# Patient Record
Sex: FEMALE | Race: WHITE | NOT HISPANIC OR LATINO | Employment: FULL TIME | URBAN - METROPOLITAN AREA
[De-identification: names, ages, dates, MRNs, and addresses within clinical notes are randomized per-mention and may not be internally consistent; named-entity substitution may affect disease eponyms.]

---

## 2017-09-14 ENCOUNTER — CONVERSION ENCOUNTER (OUTPATIENT)
Dept: RADIOLOGY | Facility: IMAGING CENTER | Age: 52
End: 2017-09-14

## 2018-04-03 LAB
ABSOL LYMPHOCYTES (HISTORICAL): 1.3 K/UL (ref 0.5–4)
ALBUMIN SERPL BCP-MCNC: 4.2 G/DL (ref 3–5.2)
ALP SERPL-CCNC: 85 U/L (ref 43–122)
ALT SERPL W P-5'-P-CCNC: 36 U/L (ref 9–52)
AMORPHOUS MATERIAL (HISTORICAL): NORMAL
ANION GAP SERPL CALCULATED.3IONS-SCNC: 9 MMOL/L (ref 5–14)
AST SERPL W P-5'-P-CCNC: 30 U/L (ref 14–36)
BACTERIA UR QL AUTO: NORMAL
BASOPHILS # BLD AUTO: 0 K/UL (ref 0–0.1)
BASOPHILS # BLD AUTO: 1 % (ref 0–1)
BILIRUB SERPL-MCNC: 0.2 MG/DL
BILIRUB UR QL STRIP: NEGATIVE MG/DL
BUN SERPL-MCNC: 21 MG/DL (ref 5–25)
CALCIUM SERPL-MCNC: 9.3 MG/DL (ref 8.4–10.2)
CASTS/CASTS TYPE (HISTORICAL): NORMAL /LPF
CHLORIDE SERPL-SCNC: 103 MEQ/L (ref 97–108)
CHOLEST SERPL-MCNC: 234 MG/DL
CHOLEST/HDLC SERPL: 5.6 {RATIO}
CLARITY UR: CLEAR
CO2 SERPL-SCNC: 29 MMOL/L (ref 22–30)
COLOR UR: YELLOW
CREATINE, SERUM (HISTORICAL): 0.8 MG/DL (ref 0.6–1.2)
CRYSTAL TYPE (HISTORICAL): NORMAL /HPF
DEPRECATED RDW RBC AUTO: 13.3 %
EGFR (HISTORICAL): >60 ML/MIN/1.73 M2
EOSINOPHIL # BLD AUTO: 0.1 K/UL (ref 0–0.4)
EOSINOPHIL NFR BLD AUTO: 1 % (ref 0–6)
ERYTHROCYTE SEDIMENTATION RATE (HISTORICAL): 11 MM (ref 1–20)
GLUCOSE FASTING (HISTORICAL): 92 MG/DL (ref 70–99)
GLUCOSE UR STRIP-MCNC: NEGATIVE MG/DL
HCT VFR BLD AUTO: 41.3 % (ref 36–46)
HDLC SERPL-MCNC: 42 MG/DL
HGB BLD-MCNC: 13.9 G/DL (ref 12–16)
HGB UR QL STRIP.AUTO: NEGATIVE
KETONES UR STRIP-MCNC: NEGATIVE MG/DL
LDL/HDL RATIO (HISTORICAL): 3.4
LDLC SERPL CALC-MCNC: 142 MG/DL
LEUKOCYTE ESTERASE UR QL STRIP: ABNORMAL
LYMPHOCYTES NFR BLD AUTO: 27 % (ref 25–45)
MAGNESIUM SERPL-MCNC: 2.2 MG/DL (ref 1.6–2.3)
MCH RBC QN AUTO: 29.2 PG (ref 26–34)
MCHC RBC AUTO-ENTMCNC: 33.6 % (ref 31–36)
MCV RBC AUTO: 87 FL (ref 80–100)
MONOCYTES # BLD AUTO: 0.3 K/UL (ref 0.2–0.9)
MONOCYTES NFR BLD AUTO: 6 % (ref 1–10)
MUCOUS THREADS URNS QL MICRO: NORMAL
NEUTROPHILS ABS COUNT (HISTORICAL): 3.1 K/UL (ref 1.8–7.8)
NEUTS SEG NFR BLD AUTO: 65 % (ref 45–65)
NITRITE UR QL STRIP: NEGATIVE
NON-SQ EPI CELLS URNS QL MICRO: NORMAL
OTHER STN SPEC: NORMAL
PH UR STRIP.AUTO: 5 [PH] (ref 4.5–8)
PLATELET # BLD AUTO: 173 K/MCL (ref 150–450)
POTASSIUM SERPL-SCNC: 5 MEQ/L (ref 3.6–5)
PROT UR STRIP-MCNC: NEGATIVE MG/DL
RBC # BLD AUTO: 4.74 M/MCL (ref 4–5.2)
RBC #/AREA URNS AUTO: NORMAL /HPF
SODIUM SERPL-SCNC: 141 MEQ/L (ref 137–147)
SP GR UR STRIP.AUTO: 1.02 (ref 1–1.04)
T4 FREE SERPL-MCNC: 0.94 NG/DL (ref 0.78–2.19)
TOTAL PROTEIN (HISTORICAL): 6.6 G/DL (ref 5.9–8.4)
TRIGL SERPL-MCNC: 251 MG/DL
TSH SERPL DL<=0.05 MIU/L-ACNC: 0.49 UIU/ML (ref 0.47–4.68)
UROBILINOGEN UR QL STRIP.AUTO: NEGATIVE MG/DL (ref 0–1)
VIT B12 SERPL-MCNC: 674 PG/ML (ref 239–931)
VITAMIN D25-HYDROXY (HISTORICAL): 83.5 NG/ML (ref 30–100)
VLDLC SERPL CALC-MCNC: 50 MG/DL (ref 0–40)
WBC # BLD AUTO: 4.8 K/MCL (ref 4.5–11)
WBC #/AREA URNS AUTO: 1 /HPF

## 2018-04-10 ENCOUNTER — CONVERSION ENCOUNTER (OUTPATIENT)
Dept: MAMMOGRAPHY | Facility: CLINIC | Age: 53
End: 2018-04-10

## 2018-10-25 ENCOUNTER — TRANSCRIBE ORDERS (OUTPATIENT)
Dept: ADMINISTRATIVE | Facility: HOSPITAL | Age: 53
End: 2018-10-25

## 2018-10-25 DIAGNOSIS — Z09 FOLLOW UP: Primary | ICD-10-CM

## 2018-11-01 ENCOUNTER — HOSPITAL ENCOUNTER (OUTPATIENT)
Dept: MAMMOGRAPHY | Facility: CLINIC | Age: 53
Discharge: HOME | End: 2018-11-01
Payer: COMMERCIAL

## 2018-11-01 DIAGNOSIS — Z09 FOLLOW UP: ICD-10-CM

## 2018-11-01 PROCEDURE — 77066 DX MAMMO INCL CAD BI: CPT

## 2019-03-22 ENCOUNTER — TRANSCRIBE ORDERS (OUTPATIENT)
Dept: ADMINISTRATIVE | Facility: HOSPITAL | Age: 54
End: 2019-03-22

## 2019-03-22 ENCOUNTER — APPOINTMENT (OUTPATIENT)
Dept: LAB | Facility: HOSPITAL | Age: 54
End: 2019-03-22
Payer: COMMERCIAL

## 2019-03-22 DIAGNOSIS — E03.9 ACQUIRED HYPOTHYROIDISM: ICD-10-CM

## 2019-03-22 DIAGNOSIS — E03.9 ACQUIRED HYPOTHYROIDISM: Primary | ICD-10-CM

## 2019-03-22 LAB — TSH SERPL DL<=0.05 MIU/L-ACNC: 1.01 UIU/ML (ref 0.36–3.74)

## 2019-03-22 PROCEDURE — 84443 ASSAY THYROID STIM HORMONE: CPT

## 2019-03-22 PROCEDURE — 36415 COLL VENOUS BLD VENIPUNCTURE: CPT

## 2020-06-01 ENCOUNTER — TRANSCRIBE ORDERS (OUTPATIENT)
Dept: ADMINISTRATIVE | Facility: HOSPITAL | Age: 55
End: 2020-06-01

## 2020-06-01 DIAGNOSIS — Z12.31 VISIT FOR SCREENING MAMMOGRAM: Primary | ICD-10-CM

## 2020-06-01 DIAGNOSIS — G47.9 SLEEP DISTURBANCES: Primary | ICD-10-CM

## 2020-06-05 ENCOUNTER — TRANSCRIBE ORDERS (OUTPATIENT)
Dept: LAB | Facility: HOSPITAL | Age: 55
End: 2020-06-05

## 2020-06-05 ENCOUNTER — HOSPITAL ENCOUNTER (OUTPATIENT)
Dept: MAMMOGRAPHY | Facility: CLINIC | Age: 55
Discharge: HOME | End: 2020-06-05
Payer: COMMERCIAL

## 2020-06-05 DIAGNOSIS — Z12.31 VISIT FOR SCREENING MAMMOGRAM: ICD-10-CM

## 2020-06-05 DIAGNOSIS — E03.9 ACQUIRED HYPOTHYROIDISM: ICD-10-CM

## 2020-06-05 DIAGNOSIS — R53.83 FATIGUE, UNSPECIFIED TYPE: Primary | ICD-10-CM

## 2020-06-05 DIAGNOSIS — R92.8 ABNORMAL MAMMOGRAM: ICD-10-CM

## 2020-06-16 ENCOUNTER — HOSPITAL ENCOUNTER (OUTPATIENT)
Dept: MAMMOGRAPHY | Facility: CLINIC | Age: 55
Discharge: HOME | End: 2020-06-16

## 2020-06-16 VITALS — BODY MASS INDEX: 30.04 KG/M2 | HEIGHT: 59 IN | WEIGHT: 149 LBS

## 2020-06-16 PROCEDURE — 77066 DX MAMMO INCL CAD BI: CPT

## 2020-06-16 PROCEDURE — G0279 TOMOSYNTHESIS, MAMMO: HCPCS

## 2020-06-23 ENCOUNTER — TRANSCRIBE ORDERS (OUTPATIENT)
Dept: ADMINISTRATIVE | Facility: HOSPITAL | Age: 55
End: 2020-06-23

## 2020-06-23 DIAGNOSIS — G47.09 OTHER INSOMNIA: Primary | ICD-10-CM

## 2020-06-26 ENCOUNTER — HOSPITAL ENCOUNTER (OUTPATIENT)
Dept: SLEEP CENTER | Facility: CLINIC | Age: 55
Discharge: HOME | End: 2020-06-26
Payer: COMMERCIAL

## 2020-06-26 DIAGNOSIS — G47.09 OTHER INSOMNIA: ICD-10-CM

## 2020-06-26 PROCEDURE — 95810 POLYSOM 6/> YRS 4/> PARAM: CPT | Performed by: INTERNAL MEDICINE

## 2020-06-26 PROCEDURE — 95810 POLYSOM 6/> YRS 4/> PARAM: CPT

## 2020-07-02 ENCOUNTER — TELEPHONE (OUTPATIENT)
Dept: SLEEP CENTER | Facility: CLINIC | Age: 55
End: 2020-07-02

## 2020-07-02 NOTE — TELEPHONE ENCOUNTER
Advised patient sleep study shows moderate AMY   Patient will call back to schedule consult with sleep medicine

## 2020-07-15 ENCOUNTER — TRANSCRIBE ORDERS (OUTPATIENT)
Dept: ADMINISTRATIVE | Facility: HOSPITAL | Age: 55
End: 2020-07-15

## 2020-07-15 DIAGNOSIS — G47.9 SLEEP DISTURBANCE: Primary | ICD-10-CM

## 2020-07-17 ENCOUNTER — OFFICE VISIT (OUTPATIENT)
Dept: SLEEP CENTER | Facility: CLINIC | Age: 55
End: 2020-07-17

## 2020-07-17 VITALS
BODY MASS INDEX: 28.51 KG/M2 | WEIGHT: 141.4 LBS | SYSTOLIC BLOOD PRESSURE: 118 MMHG | DIASTOLIC BLOOD PRESSURE: 82 MMHG | HEART RATE: 78 BPM | OXYGEN SATURATION: 99 % | HEIGHT: 59 IN

## 2020-07-17 DIAGNOSIS — G47.9 SLEEP DISTURBANCE: ICD-10-CM

## 2020-07-17 DIAGNOSIS — G47.33 OSA (OBSTRUCTIVE SLEEP APNEA): Primary | ICD-10-CM

## 2020-07-17 DIAGNOSIS — G47.26 SHIFT WORK SLEEP DISORDER: ICD-10-CM

## 2020-07-17 DIAGNOSIS — E66.9 OBESITY (BMI 30-39.9): ICD-10-CM

## 2020-07-17 DIAGNOSIS — F41.9 ANXIETY AND DEPRESSION: ICD-10-CM

## 2020-07-17 DIAGNOSIS — M79.7 FIBROMYALGIA SYNDROME: ICD-10-CM

## 2020-07-17 DIAGNOSIS — F32.A ANXIETY AND DEPRESSION: ICD-10-CM

## 2020-07-17 PROCEDURE — 99244 OFF/OP CNSLTJ NEW/EST MOD 40: CPT | Performed by: INTERNAL MEDICINE

## 2020-07-17 RX ORDER — CYCLOBENZAPRINE HCL 10 MG
10 TABLET ORAL DAILY
COMMUNITY
Start: 2020-04-30 | End: 2021-03-11 | Stop reason: SDUPTHER

## 2020-07-17 RX ORDER — LEVOTHYROXINE SODIUM 0.07 MG/1
75 TABLET ORAL DAILY
COMMUNITY
Start: 2020-04-30 | End: 2020-10-22 | Stop reason: SDUPTHER

## 2020-07-17 RX ORDER — DICYCLOMINE HYDROCHLORIDE 10 MG/1
1 CAPSULE ORAL 3 TIMES DAILY
COMMUNITY
Start: 2013-03-27 | End: 2022-06-13 | Stop reason: SDUPTHER

## 2020-07-17 RX ORDER — ZOLPIDEM TARTRATE 10 MG/1
10 TABLET ORAL DAILY PRN
COMMUNITY
Start: 2020-04-30 | End: 2021-09-24

## 2020-07-17 RX ORDER — SERTRALINE HYDROCHLORIDE 100 MG/1
150 TABLET, FILM COATED ORAL EVERY 24 HOURS
COMMUNITY
Start: 2018-02-26 | End: 2020-10-22 | Stop reason: ALTCHOICE

## 2020-07-17 NOTE — PROGRESS NOTES
Review of Systems      Genitourinary need to urinate more than twice a night and post menopausal (no peroids)   Cardiology palpitations/fluttering feeling in the chest   Gastrointestinal frequent heartburn/acid reflux   Neurology need to move extremities, muscle weakness, numbness/tingling of an extremity, forgetfulness, poor concentration or confusion,  and difficulty with memory   Constitutional fatigue   Integumentary none   Psychiatry anxiety and depression   Musculoskeletal joint pain, muscle aches, back pain, legs twitching/jerking and sciatica   Pulmonary snoring   ENT throat clearing   Endocrine frequent urination   Hematological none

## 2020-07-17 NOTE — PROGRESS NOTES
Consultation - [address] ARIANA Perales  54 y o  female  :1965  [id number]    Physician Requesting Consult: Ranjit Duarte MD     Reason for Consult : At your kind request I saw this patient for initial sleep evaluation today  Patient recently had a diagnostic sleep study and is here to review results / treatment options  The study demonstrated   obstructive sleep apnea: AHI 24 /hour considerably higher during REM at 50 per hour  Minimum oxygen saturation 78 %  and 13 6 minutes of total sleep time was spent with saturations less than 90%  PFSH, Problem List, Medications & Allergies were reviewed in EMR  She  has no past medical history on file  She has a current medication list which includes the following prescription(s): cyclobenzaprine, dicyclomine, inositol niacinate, levothyroxine, sertraline, and zolpidem  HPI:  Study was undertaken for her complaints of difficulty sleeping of several years duration  She works 3rd shift and sleep difficulties have escalated recently  She has racing thoughts due to anxiety for which she was on Zoloft 100 mg daily and is due to be increased to 150 mg  She typically sleeps alone but family note snoring  At times she has a woken herself with choking or gasping  Other Complaints:  Increased psychosocial stressors  Restless Leg Syndrome: has suggestive symptoms and feels involuntary jerking of her limbs for which she is using a weighted blanket  Parasomnia: reports teeth grinding during sleep for which she uses a dental guard, but no other features of parasomnia   Sleep Routine:   Typical Bedtime:  6:00 a m  Gets OOB:  10:00 a m  TIB:4 hrs  Sleep latency:> 60 minutes Sleep Interruptions:1-2/nite because of nocturia  Awakens: with aid of an alarm  Upon awakening: never feels rested  She estimates getting 2 hrs sleep    She gets an additional 2 hours sleep in the afternoons out of approximately 5 hours trying to sleep in spite of zolpidem 10 mg   She has episodic Sleepiness and 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  Foster Sleepiness Scale rated at Total score: 3 /24  Habits: reports that she has never smoked  She has never used smokeless tobacco  , has no alcohol history on file  ,  has no drug history on file  ,Caffeine use:limited , Exercise routine: sometimes   Family History: Negative for sleep disturbance  ROS - constitutional, psychiatric, ENT, respiratory,CVS, GI, UGS, CNS, MSK, integumentary, endocrine, hematological reviewed  Significant for weight has been stable    The 12 point review of systems was otherwise negative  EXAM:  /82   Pulse 78   Ht 4' 11" (1 499 m)   Wt 64 1 kg (141 lb 6 4 oz)   SpO2 99%   BMI 28 56 kg/m²    General: Well groomed female, well appearing, in no apparent distress  Psychiatric: Alert and orientated; Cooperative; Speech:  Pressured; appears anxious and has a constricted affect  HEENT:  Craniofacial anatomy: slight overjet  Sinuses: non- tender  TMJ: Normal    Eyes: EOM's intact;  conjunctiva/corneas clear   Ears: Externallyappear normal     Nasal Airway: assymetric nares Septum:  Deviated; Mucosa: normal; Turbinates: normal; Rhinorrhea: None   Mouth: Lips: normal posture; Dentition: normal   Mucosa:moist  ; Hard Palate:normal    Oropharryx: crowded and AP narrowing Tongue: Mallampati:Class IV, Mobile and ScallopedSoft Palate:  redundant  Tonsils: no hypertrophy  Neck: Neck Circumference: 13 5 "; Supple; no abnormal masses; Thyroid:normal  Trachea:central     Lymph: No Cervical or Submandibular Lymhadenopathy  Heart: S1,S2 normal; RRR; no gallop; nomurmurs   Lungs: Respiratory Effort:normal  Air entry good bilaterally  No wheezes  No rales  Abdomen: Obese, Soft & non-tender    Extremities: No pedal edema  No clubbing or cyanosis  Skin: warm and dry; Color& Hydration good; no facial rashes or lesions   Neurological: CNII-XII intact;  Motor normal; Sensation normal  Musculoskeletal: Muscle bulk, tone and power WNL Gait:normal        IMPRESSION: Primary, Secondary Sleep Diagnoses (to Medical or Psych conditions) & Comorbidities   1  AMY (obstructive sleep apnea)  Cpap DME   2  Sleep disturbance  Ambulatory referral to Sleep Medicine   3  Shift work sleep disorder     4  Anxiety and depression     5  Fibromyalgia syndrome     6  Obesity (BMI 30-39  9)          PLAN:   1  I reviewed results of the Sleep study with the patient  2  With respect to above conditions, I counseled on pathophysiology, diagnosis, treatment options, risks and benefits; inter-relationship and effects on symptoms and comorbidities; risks of no treatment; costs and insurance aspects  3  Patient elected positive airway pressure therapy but because of insurance constraints, cannot undertake an in-lab titration study  I gave her a prescription for auto titrating Pap 5-15 cm H2O     4  Cognitive behavioral therapy was initiated, Sleep Hygiene and behavioral techniques to manage Insomnia and cope with shift work were discussed  I also advised considering changing to regular daytime shift work  5  She may need reviewing of her psychiatric medication  Consideration may be given to Cymbalta if symptoms persist, as it may in addition be beneficial for fibromyalgia  6  I also advised on weight reduction  7  Follow-up to be scheduled in 2 months to monitor progress, compliance and to adjust therapy           Sincerely,     Authenticated electronically by Karina Feldman MD   on 25/48/96   Board Certified Specialist

## 2020-07-17 NOTE — PATIENT INSTRUCTIONS
What is AMY? Obstructive sleep apnea is a common and serious sleep disorder that causes you to stop breathing during sleep  The airway repeatedly becomes blocked, limiting the amount of air that reaches your lungs  When this happens, you may snore loudly or making choking noises as you try to breathe  Your brain and body becomes oxygen deprived and you may wake up  This may happen a few times a night, or in more severe cases, several hundred times a night  Sleep apnea can make you wake up in the morning feeling tired or unrefreshed even though you have had a full night of sleep  During the day, you may feel fatigued, have difficulty concentrating or you may even unintentionally fall asleep  This is because your body is waking up numerous times throughout the night, even though you might not be conscious of each awakening  The lack of oxygen your body receives can have negative long-term consequences for your health  This includes:  High blood pressure  Heart disease  Irregular heart rhythms  Stroke  Pre-diabetes and diabetes  Depression    Testing  An objective evaluation of your sleep may be needed before your board certified sleep physician can make a diagnosis  Options include:   In-lab overnight sleep study  This type of sleep study requires you to stay overnight at a sleep center, in a bed that may resemble a hotel room  You will sleep with sensors hooked up to various parts of your body  These sensors record your brain waves, heartbeat, breathing and movement  An overnight sleep study also provides your doctor with the most complete information about your sleep  Learn more about an overnight sleep study      Home sleep apnea test  Some patients with high risk factors for obstructive sleep apnea and no other medical disorders may be candidates for a home sleep apnea test  The testing equipment differs in that it is less complicated than what is used in an overnight sleep study   As such, does not give all the data an in-lab will and if negative, may not mean you do not have the problem  Treatment for sleep apnea  includes using a continuous positive airway pressure (CPAP) machine to keep your airway open during sleep  A mask is placed over your nose and mouth, or just your nose  The mask is hooked to the CPAP machine that blows a gentle stream of air into the mask when you breathe  This helps keep your airway open so you can breathe more regularly  Extra oxygen may be given to you through the machine  You may be given a mouth device  It looks like a mouth guard or dental retainer and stops your tongue and mouth tissues from blocking your throat while you sleep  Surgery may be needed to remove extra tissues that block your mouth, throat, or nose  Manage sleep apnea:   Do not smoke  Nicotine and other chemicals in cigarettes and cigars can cause lung damage  Ask your healthcare provider for information if you currently smoke and need help to quit  E-cigarettes or smokeless tobacco still contain nicotine  Talk to your healthcare provider before you use these products  Do not drink alcohol or take sedative medicine before you go to sleep  Alcohol and sedatives can relax the muscles and tissues around your throat  This can block the airflow to your lungs  Maintain a healthy weight  Excess tissue around your throat may restrict your breathing  Ask your healthcare provider for information if you need to lose weight  Sleep on your side or use pillows designed to prevent sleep apnea  This prevents your tongue or other tissues from blocking your throat  You can also raise the head of your bed  Driving Safety  Refrain from driving when drowsy  Follow up with your healthcare provider as directed:  Write down your questions so you remember to ask them during your visits  Go to [employer]ASM website for more information: Sleepeducation  org     What is AMY?    Obstructive sleep apnea is a common and serious sleep disorder that causes you to stop breathing during sleep  The airway repeatedly becomes blocked, limiting the amount of air that reaches your lungs  When this happens, you may snore loudly or making choking noises as you try to breathe  Your brain and body becomes oxygen deprived and you may wake up  This may happen a few times a night, or in more severe cases, several hundred times a night  Sleep apnea can make you wake up in the morning feeling tired or unrefreshed even though you have had a full night of sleep  During the day, you may feel fatigued, have difficulty concentrating or you may even unintentionally fall asleep  This is because your body is waking up numerous times throughout the night, even though you might not be conscious of each awakening  The lack of oxygen your body receives can have negative long-term consequences for your health  This includes:  High blood pressure  Heart disease  Irregular heart rhythms  Stroke  Pre-diabetes and diabetes  Depression    Testing  An objective evaluation of your sleep may be needed before your board certified sleep physician can make a diagnosis  Options include:   In-lab overnight sleep study  This type of sleep study requires you to stay overnight at a sleep center, in a bed that may resemble a hotel room  You will sleep with sensors hooked up to various parts of your body  These sensors record your brain waves, heartbeat, breathing and movement  An overnight sleep study also provides your doctor with the most complete information about your sleep  Learn more about an overnight sleep study      Home sleep apnea test  Some patients with high risk factors for obstructive sleep apnea and no other medical disorders may be candidates for a home sleep apnea test  The testing equipment differs in that it is less complicated than what is used in an overnight sleep study   As such, does not give all the data an in-lab will and if negative, may not mean you do not have the problem  Treatment for sleep apnea  includes using a continuous positive airway pressure (CPAP) machine to keep your airway open during sleep  A mask is placed over your nose and mouth, or just your nose  The mask is hooked to the CPAP machine that blows a gentle stream of air into the mask when you breathe  This helps keep your airway open so you can breathe more regularly  Extra oxygen may be given to you through the machine  You may be given a mouth device  It looks like a mouth guard or dental retainer and stops your tongue and mouth tissues from blocking your throat while you sleep  Surgery may be needed to remove extra tissues that block your mouth, throat, or nose  Manage sleep apnea:   Do not smoke  Nicotine and other chemicals in cigarettes and cigars can cause lung damage  Ask your healthcare provider for information if you currently smoke and need help to quit  E-cigarettes or smokeless tobacco still contain nicotine  Talk to your healthcare provider before you use these products  Do not drink alcohol or take sedative medicine before you go to sleep  Alcohol and sedatives can relax the muscles and tissues around your throat  This can block the airflow to your lungs  Maintain a healthy weight  Excess tissue around your throat may restrict your breathing  Ask your healthcare provider for information if you need to lose weight  Sleep on your side or use pillows designed to prevent sleep apnea  This prevents your tongue or other tissues from blocking your throat  You can also raise the head of your bed  Driving Safety  Refrain from driving when drowsy  Follow up with your healthcare provider as directed:  Write down your questions so you remember to ask them during your visits  Go to AASM website for more information: Sleepeducation  org       What you can do to improve your sleep: (Sleep Hygiene) Basic rules for a good night's sleep    Create a regular sleep schedule    This will help you form a sleep routine  Keep a record of your sleep patterns, and any sleeping problems you have  Bring the record to follow-up visits with healthcare providers  Avoid prolonged use of light-emitting screens before bedtime or watching TV in bed  Avoid forcing sleep  Do not take naps  Naps could make it hard for you to fall asleep at bedtime  Deal with your worries before bedtime  Keep your bedroom cool, quiet, and dark  Turn on white noise, such as a fan, to help you relax  Do not use your bed for any activity that will keep you awake  Do not read, exercise, eat, or watch TV in your bedroom  Get up if you do not fall asleep within 20 minutes  Move to another room and do something relaxing until you become sleepy  Limit caffeine, alcohol, nicotine and food to earlier in the day  Only drink caffeine in the morning  Do not drink alcohol within 6 hours of bedtime  Do not eat a heavy meal right before you go to bed  Avoid smoking, especially in the evening  Exercise regularly  Daily exercise will help you sleep better  Do not exercise within 4 hours of bedtime  Stimulus control therapy rules  1  Go to bed only when sleepy  2  Do not watch television, read, eat, or worry while in bed  Use bed only for sleep and sex  3  Get out of bed if unable to fall asleep within 20 minutes and go to another room  Return to bed only when sleepy  Repeat this step as many times as necessary throughout the night  4  Set an alarm clock to wake up at a fixed time each morning, including weekends  5  Do not take a nap during the day  Data from: [address] [place] [address], [address] [employer] Nonpharmacologic treatments of insomnia  J [facility] Psych[facility] 4687; 53:37  Go to AASM website for more information: Sleepeducation  org     Recommended Reading:  Book by authors [address]   No More sleepless nights          Nursing Support:  When: Monday through Friday 7A-5PM except holidays  Where: Our direct line is [phone]  If you are having a true emergency please call 911  In the event that the line is busy or it is after hours please leave a voice message and we will return your call  Please speak clearly, leaving your full name, birth date, best number to reach you and the reason for your call  Medication refills: We will need the name of the medication, the dosage, the ordering provider, whether you get a 30 or 90 day refill, and the pharmacy name and address  Medications will be ordered by the provider only  Nurses cannot call in prescriptions  Please allow 7 days for medication refills  Physician requested updates: If your provider requested that you call with an update after starting medication, please be ready to provide us the medication and dosage, what time you take your medication, the time you attempt to fall asleep, time you fall asleep, when you wake up, and what time you get out of bed  Sleep Study Results: We will contact you with sleep study results and/or next steps after the physician has reviewed your testing

## 2020-09-18 ENCOUNTER — OFFICE VISIT (OUTPATIENT)
Dept: SLEEP CENTER | Facility: CLINIC | Age: 55
End: 2020-09-18
Payer: COMMERCIAL

## 2020-09-18 VITALS
HEIGHT: 59 IN | BODY MASS INDEX: 28.51 KG/M2 | DIASTOLIC BLOOD PRESSURE: 82 MMHG | SYSTOLIC BLOOD PRESSURE: 112 MMHG | HEART RATE: 93 BPM | WEIGHT: 141.4 LBS | OXYGEN SATURATION: 98 %

## 2020-09-18 DIAGNOSIS — E66.9 OBESITY (BMI 30-39.9): ICD-10-CM

## 2020-09-18 DIAGNOSIS — F41.9 ANXIETY AND DEPRESSION: ICD-10-CM

## 2020-09-18 DIAGNOSIS — M79.7 FIBROMYALGIA SYNDROME: ICD-10-CM

## 2020-09-18 DIAGNOSIS — F32.A ANXIETY AND DEPRESSION: ICD-10-CM

## 2020-09-18 DIAGNOSIS — G47.26 SHIFT WORK SLEEP DISORDER: ICD-10-CM

## 2020-09-18 DIAGNOSIS — G47.33 OSA (OBSTRUCTIVE SLEEP APNEA): Primary | ICD-10-CM

## 2020-09-18 DIAGNOSIS — G47.9 SLEEP DISTURBANCE: ICD-10-CM

## 2020-09-18 PROCEDURE — 99214 OFFICE O/P EST MOD 30 MIN: CPT | Performed by: INTERNAL MEDICINE

## 2020-09-18 NOTE — PATIENT INSTRUCTIONS

## 2020-09-18 NOTE — PROGRESS NOTES
Review of Systems      Genitourinary need to urinate more than twice a night and post menopausal (no peroids)   Cardiology Frequent chest pain or angina,    Gastrointestinal frequent heartburn/acid reflux   Neurology muscle weakness, numbness/tingling of an extremity, forgetfulness and poor concentration or confusion,    Constitutional fatigue   Integumentary none   Psychiatry anxiety and depression   Musculoskeletal joint pain, muscle aches and back pain   Pulmonary none   ENT throat clearing   Endocrine frequent urination   Hematological none

## 2020-09-18 NOTE — PROGRESS NOTES
Follow-Up Note - [address]  54 y o  female  :1965  :[id number]    CC: I saw this patient for follow-up in clinic today for Sleep disordered breathing, Coexisting Sleep and Medical Problems  The study demonstrated   obstructive sleep apnea: AHI 24 /hour considerably higher during REM at 50 per hour  Minimum oxygen saturation 78 %  and 13 6 minutes of total sleep time was spent with saturations less than 90%  PFSH, Problem List, Medications & Allergies were reviewed in EMR  Interval changes: none reported  She  has no past medical history on file  She has a current medication list which includes the following prescription(s): cyclobenzaprine, dicyclomine, inositol niacinate, levothyroxine, sertraline, and zolpidem  ROS: constitutional, psychiatric, ENT, respiratory,CVS, GI, UGS, CNS, MSK, integumentary, endocrine, hematological reviewed  Significant for she had recent increase of her Zoloft but continues to have racing thoughts  She also has increased acid reflux and she discontinued Prilosec  DATA REVIEWED:  No data was available, but she reports regular use of the device for > 4hours/night  SUBJECTIVE: Regarding use of PAP, Arlette West reports:   · She is experiencing some adverse effects: dry mouth/throat and dry nose  · She is   benefiting from use: Unsure   Sleep Routine: She reports getting 2-3 hrs sleep in the morning between 6 and 10:00 a m  And additional few hours in the afternoon; she has difficulty initiating and maintaining sleep   She tried Ambien and felt no benefit  She awakens with aid of an alarm and is not always refreshed  She has episodic drowsiness but avoids napping  She rated herself at Total score: 4 /24 on the [place] sleepiness scale  Habits: reports that she has never smoked  She has never used smokeless tobacco ,  reports previous alcohol use ,  has no history on file for drug  , Caffeine use: moderate , Exercise routine: regular    EXAM: /82   Pulse 93   Ht 4' 11" (1 499 m)   Wt 64 1 kg (141 lb 6 4 oz)   SpO2 98%   BMI 28 56 kg/m²   Patient is well groomed; well appearing  Skin/Extrem: warm & dry; col & hydration normal; no edema  Psych: cooperativeand in no distress  Mental state appears normal   CNS: Alert, orientated, clear & coherent speech  H&N: EOMI; NC/AT:no facial pressure marks, no rashes  ENMT Mucus membranes appear normal Nasal airway:patent  Oral airway:  crowded  Resp:effort is normal CVS: RRR ABD:truncal obesity MSK:Gait normal     IMPRESSION: Primary Sleep/Secondary(to Medical or Psych conditions) & comorbidities   1  AMY (obstructive sleep apnea)  PAP DME Resupply/Reorder   2  Sleep disturbance     3  Shift work sleep disorder     4  Anxiety and depression     5  Fibromyalgia syndrome     6  Obesity (BMI 30-39  9)         PLAN:  1  Treatment with  PAP is medically necessary and Anselmo Lozada is agreable to continue use  2  Care of equipment, methods to improve comfort using PAP and importance of compliance with therapy were discussed  3  Pressure setting: continue 5-15 cmH2O     4  Rx provided to replace supplies and Care coordinated with DME provider  5  She is unable to change her work shift  I discussed strategies to cope with shift work  6  She is under care for her psychiatric problems  7  Discussed  strategies for weight reduction  8  Follow-up is advised in 1 year or sooner if needed to monitor progress, compliance and to adjust therapy  Thank you for allowing me to participate in the care of this patient      Sincerely,    Authenticated electronically by Som Herrera MD on 10/19/30   Board Certified Specialist

## 2020-09-21 ENCOUNTER — TELEPHONE (OUTPATIENT)
Dept: SLEEP CENTER | Facility: CLINIC | Age: 55
End: 2020-09-21

## 2020-09-21 NOTE — TELEPHONE ENCOUNTER
Left message for patient regarding resupply order  Requested patient call office back with name of DME provider

## 2020-09-24 NOTE — TELEPHONE ENCOUNTER
Patient left message, states she gets her supplies on line, asking for script to be mailed to her home    Script mailed

## 2020-10-20 ENCOUNTER — TELEPHONE (OUTPATIENT)
Dept: FAMILY MEDICINE CLINIC | Facility: CLINIC | Age: 55
End: 2020-10-20

## 2020-10-22 ENCOUNTER — TELEPHONE (OUTPATIENT)
Dept: FAMILY MEDICINE CLINIC | Facility: CLINIC | Age: 55
End: 2020-10-22

## 2020-10-22 ENCOUNTER — OFFICE VISIT (OUTPATIENT)
Dept: FAMILY MEDICINE CLINIC | Facility: CLINIC | Age: 55
End: 2020-10-22
Payer: COMMERCIAL

## 2020-10-22 VITALS
SYSTOLIC BLOOD PRESSURE: 110 MMHG | HEIGHT: 60 IN | BODY MASS INDEX: 27.29 KG/M2 | TEMPERATURE: 97.8 F | WEIGHT: 139 LBS | DIASTOLIC BLOOD PRESSURE: 64 MMHG

## 2020-10-22 DIAGNOSIS — K21.9 GASTROESOPHAGEAL REFLUX DISEASE WITHOUT ESOPHAGITIS: ICD-10-CM

## 2020-10-22 DIAGNOSIS — F41.1 GENERALIZED ANXIETY DISORDER: Primary | ICD-10-CM

## 2020-10-22 DIAGNOSIS — E78.5 DYSLIPIDEMIA: ICD-10-CM

## 2020-10-22 DIAGNOSIS — G47.33 OBSTRUCTIVE SLEEP APNEA SYNDROME: ICD-10-CM

## 2020-10-22 DIAGNOSIS — E03.9 ACQUIRED HYPOTHYROIDISM: ICD-10-CM

## 2020-10-22 PROCEDURE — 1036F TOBACCO NON-USER: CPT | Performed by: FAMILY MEDICINE

## 2020-10-22 PROCEDURE — 3725F SCREEN DEPRESSION PERFORMED: CPT | Performed by: FAMILY MEDICINE

## 2020-10-22 PROCEDURE — 99204 OFFICE O/P NEW MOD 45 MIN: CPT | Performed by: FAMILY MEDICINE

## 2020-10-22 RX ORDER — SUCRALFATE 1 G/1
1 TABLET ORAL 4 TIMES DAILY
COMMUNITY
End: 2021-06-24 | Stop reason: ALTCHOICE

## 2020-10-22 RX ORDER — LEVOTHYROXINE SODIUM 0.07 MG/1
75 TABLET ORAL DAILY
Qty: 90 TABLET | Refills: 3 | Status: SHIPPED | OUTPATIENT
Start: 2020-10-22 | End: 2021-09-24

## 2020-10-22 RX ORDER — ESCITALOPRAM OXALATE 20 MG/1
20 TABLET ORAL DAILY
Qty: 90 TABLET | Refills: 1 | Status: SHIPPED | OUTPATIENT
Start: 2020-10-22 | End: 2021-04-01 | Stop reason: ALTCHOICE

## 2020-10-22 RX ORDER — CETIRIZINE HYDROCHLORIDE 10 MG/1
10 TABLET ORAL DAILY
COMMUNITY

## 2021-01-07 ENCOUNTER — TELEMEDICINE (OUTPATIENT)
Dept: FAMILY MEDICINE CLINIC | Facility: CLINIC | Age: 56
End: 2021-01-07
Payer: COMMERCIAL

## 2021-01-07 DIAGNOSIS — Z11.59 SCREENING FOR VIRAL DISEASE: ICD-10-CM

## 2021-01-07 DIAGNOSIS — Z11.59 SCREENING FOR VIRAL DISEASE: Primary | ICD-10-CM

## 2021-01-07 PROCEDURE — U0005 INFEC AGEN DETEC AMPLI PROBE: HCPCS | Performed by: FAMILY MEDICINE

## 2021-01-07 PROCEDURE — U0003 INFECTIOUS AGENT DETECTION BY NUCLEIC ACID (DNA OR RNA); SEVERE ACUTE RESPIRATORY SYNDROME CORONAVIRUS 2 (SARS-COV-2) (CORONAVIRUS DISEASE [COVID-19]), AMPLIFIED PROBE TECHNIQUE, MAKING USE OF HIGH THROUGHPUT TECHNOLOGIES AS DESCRIBED BY CMS-2020-01-R: HCPCS | Performed by: FAMILY MEDICINE

## 2021-01-07 PROCEDURE — 99213 OFFICE O/P EST LOW 20 MIN: CPT | Performed by: FAMILY MEDICINE

## 2021-01-07 NOTE — PROGRESS NOTES
Virtual Regular Visit      Assessment/Plan:  Await COVID testing  We talked about possibly starting vitamin C vitamin D and zinc   She will call with any worsening or persisting symptoms in the interim  Problem List Items Addressed This Visit     None      Visit Diagnoses     Screening for viral disease    -  Primary    Relevant Orders    Novel Coronavirus (COVID-19), PCR [facility] - Collected at [facility] or [facility]               Reason for visit is   Chief Complaint   Patient presents with    Virtual Regular Visit        Encounter provider Nancy Jiang DO    Provider located at [address] [place] [address] [phone]      Recent Visits  No visits were found meeting these conditions  Showing recent visits within past 7 days and meeting all other requirements     Today's Visits  Date Type Provider Dept   01/07/21 Telemedicine Nancy Jiang DO [facility]   Showing today's visits and meeting all other requirements     Future Appointments  No visits were found meeting these conditions  Showing future appointments within next 150 days and meeting all other requirements        The patient was identified by name and date of birth  Frank Alvarez was informed that this is a telemedicine visit and that the visit is being conducted through [address] [place] [address] and patient was informed that this is not a secure, HIPAA-compliant platform  She agrees to proceed     My office door was closed  No one else was in the room  She acknowledged consent and understanding of privacy and secu for COVID rity of the video platform  The patient has agreed to participate and understands they can discontinue the visit at any time  Patient is aware this is a billable service  Subjective  Frank Alvarez is a 54 y o  female for COVID exposure         Patient was recently exposed to [place]  Her son who lives in the house was diagnosed with COVID a few days ago  He has mild symptoms  Patient began with mild congestion today  No past medical history on file  Past Surgical History:   Procedure Laterality Date    GALLBLADDER SURGERY      HYSTERECTOMY  2010       Current Outpatient Medications   Medication Sig Dispense Refill    AZELASTINE-FLUTICASONE NA into each nostril      cetirizine (ZyrTEC) 10 mg tablet Take 10 mg by mouth daily      cyclobenzaprine (FLEXERIL) 10 mg tablet Take 10 mg by mouth daily      dicyclomine (BENTYL) 10 mg capsule Take 1 capsule by mouth 3 (three) times a day       escitalopram (LEXAPRO) 20 mg tablet Take 1 tablet (20 mg total) by mouth daily 90 tablet 1    Inositol Niacinate 500 MG CAPS Take 1 capsule by mouth every 12 (twelve) hours      levothyroxine 75 mcg tablet Take 1 tablet (75 mcg total) by mouth daily 90 tablet 3    MILK THISTLE PO Take by mouth      Omega-3 Fatty Acids (FISH OIL PO) Take by mouth      Probiotic Product (PROBIOTIC PO) Take by mouth      sucralfate (CARAFATE) 1 g tablet Take 1 g by mouth 4 (four) times a day      zolpidem (AMBIEN) 10 mg tablet Take 10 mg by mouth daily as needed       No current facility-administered medications for this visit  Allergies   Allergen Reactions    Aspirin Other (See Comments)     Reaction Date: 08Jun2011;       Erythromycin Base Other (See Comments)    Iodine Other (See Comments)     ivp dye    Morphine Headache and Other (See Comments)     migraine headaches      Prochlorperazine Other (See Comments)    Tetracycline Other (See Comments)     Reaction Date: 08Jun2011;       Metoclopramide Anxiety, Other (See Comments) and Rash     Reaction Date: 08Jun2011;          Review of Systems   Constitutional: Negative  HENT: Negative  Eyes: Negative  Respiratory: Negative  Cardiovascular: Negative  Gastrointestinal: Negative  Endocrine: Negative  Genitourinary: Negative  Musculoskeletal: Negative  Skin: Negative  Allergic/Immunologic: Negative  Neurological: Negative  Hematological: Negative  Psychiatric/Behavioral: Negative  Video Exam    There were no vitals filed for this visit  Physical Exam     I spent 15 minutes directly with the patient during this visit      [address] acknowledges that she has consented to an online visit or consultation  She understands that the online visit is based solely on information provided by her, and that, in the absence of a face-to-face physical evaluation by the physician, the diagnosis she receives is both limited and provisional in terms of accuracy and completeness  This is not intended to replace a full medical face-to-face evaluation by the physician  Kosta Pope understands and accepts these terms

## 2021-01-08 LAB — SARS-COV-2 RNA SPEC QL NAA+PROBE: DETECTED

## 2021-01-11 ENCOUNTER — TELEMEDICINE (OUTPATIENT)
Dept: FAMILY MEDICINE CLINIC | Facility: CLINIC | Age: 56
End: 2021-01-11
Payer: COMMERCIAL

## 2021-01-11 DIAGNOSIS — U07.1 COVID-19: Primary | ICD-10-CM

## 2021-01-11 PROCEDURE — 99213 OFFICE O/P EST LOW 20 MIN: CPT | Performed by: FAMILY MEDICINE

## 2021-01-11 NOTE — PROGRESS NOTES
Virtual Regular Visit      Assessment/Plan:  Patient appears to be doing well  She is currently asymptomatic and will continue to monitor for symptoms  Problem List Items Addressed This Visit        Other    COVID-19 - Primary               Reason for visit is No chief complaint on file  Encounter provider Esau Vela DO    Provider located at [address] [facility] [phone]      Recent Visits  Date Type Provider Dept   01/07/21 Sinai Welhc DO [address] recent visits within past 7 days and meeting all other requirements     Future Appointments  No visits were found meeting these conditions  Showing future appointments within next 150 days and meeting all other requirements        The patient was identified by name and date of birth  Madhavi Balbuena was informed that this is a telemedicine visit and that the visit is being conducted through [employer] and patient was informed that this is not a secure, HIPAA-compliant platform  She agrees to proceed     My office door was closed  No one else was in the room  She acknowledged consent and understanding of privacy and security of the video platform  The patient has agreed to participate and understands they can discontinue the visit at any time  Patient is aware this is a billable service  Subjective  Madhavi Balbuena is a 54 y o  female For Covid 23   Patient recently tested positive for COVID on 01/07/2021  She remains asymptomatic  She believe she was exposed to her son who did test positive  Son tested positive a week ago  Patient had mild back aches for a few days with onset of symptoms but then no symptoms resolved  She has no cough and no fever  No past medical history on file      Past Surgical History:   Procedure Laterality Date    GALLBLADDER SURGERY      HYSTERECTOMY  2010       Current Outpatient Medications   Medication Sig Dispense Refill    AZELASTINE-FLUTICASONE NA into each nostril      cetirizine (ZyrTEC) 10 mg tablet Take 10 mg by mouth daily      cyclobenzaprine (FLEXERIL) 10 mg tablet Take 10 mg by mouth daily      dicyclomine (BENTYL) 10 mg capsule Take 1 capsule by mouth 3 (three) times a day       escitalopram (LEXAPRO) 20 mg tablet Take 1 tablet (20 mg total) by mouth daily 90 tablet 1    Inositol Niacinate 500 MG CAPS Take 1 capsule by mouth every 12 (twelve) hours      levothyroxine 75 mcg tablet Take 1 tablet (75 mcg total) by mouth daily 90 tablet 3    MILK THISTLE PO Take by mouth      Omega-3 Fatty Acids (FISH OIL PO) Take by mouth      Probiotic Product (PROBIOTIC PO) Take by mouth      sucralfate (CARAFATE) 1 g tablet Take 1 g by mouth 4 (four) times a day      zolpidem (AMBIEN) 10 mg tablet Take 10 mg by mouth daily as needed       No current facility-administered medications for this visit  Allergies   Allergen Reactions    Aspirin Other (See Comments)     Reaction Date: 08Jun2011;       Erythromycin Base Other (See Comments)    Iodine Other (See Comments)     ivp dye    Morphine Headache and Other (See Comments)     migraine headaches      Prochlorperazine Other (See Comments)    Tetracycline Other (See Comments)     Reaction Date: 08Jun2011;       Metoclopramide Anxiety, Other (See Comments) and Rash     Reaction Date: 08Jun2011;          Review of Systems   Constitutional: Negative  HENT: Positive for congestion  Eyes: Negative  Respiratory: Negative  Cardiovascular: Negative  Gastrointestinal: Negative  Endocrine: Negative  Genitourinary: Negative  Musculoskeletal: Negative  Skin: Negative  Allergic/Immunologic: Negative  Neurological: Negative  Hematological: Negative  Psychiatric/Behavioral: Negative  Video Exam    There were no vitals filed for this visit  Physical Exam  Constitutional:       General: She is not in acute distress  Appearance: She is well-developed  She is not diaphoretic  Neurological:      Mental Status: She is alert and oriented to person, place, and time  Psychiatric:         Behavior: Behavior normal          Thought Content: Thought content normal          Judgment: Judgment normal           I spent 15 minutes directly with the patient during this visit      [address] acknowledges that she has consented to an online visit or consultation  She understands that the online visit is based solely on information provided by her, and that, in the absence of a face-to-face physical evaluation by the physician, the diagnosis she receives is both limited and provisional in terms of accuracy and completeness  This is not intended to replace a full medical face-to-face evaluation by the physician  Camron Mitchell understands and accepts these terms

## 2021-01-28 ENCOUNTER — OFFICE VISIT (OUTPATIENT)
Dept: GASTROENTEROLOGY | Facility: CLINIC | Age: 56
End: 2021-01-28
Payer: COMMERCIAL

## 2021-01-28 VITALS
HEIGHT: 59 IN | SYSTOLIC BLOOD PRESSURE: 113 MMHG | DIASTOLIC BLOOD PRESSURE: 82 MMHG | HEART RATE: 96 BPM | BODY MASS INDEX: 27.42 KG/M2 | WEIGHT: 136 LBS

## 2021-01-28 DIAGNOSIS — K21.9 GASTROESOPHAGEAL REFLUX DISEASE, UNSPECIFIED WHETHER ESOPHAGITIS PRESENT: ICD-10-CM

## 2021-01-28 DIAGNOSIS — Z80.0 FAMILY HISTORY OF COLON CANCER: ICD-10-CM

## 2021-01-28 DIAGNOSIS — R14.0 BLOATING: Primary | ICD-10-CM

## 2021-01-28 DIAGNOSIS — K59.00 CONSTIPATION, UNSPECIFIED CONSTIPATION TYPE: ICD-10-CM

## 2021-01-28 PROCEDURE — 99204 OFFICE O/P NEW MOD 45 MIN: CPT | Performed by: PHYSICIAN ASSISTANT

## 2021-01-28 PROCEDURE — 1036F TOBACCO NON-USER: CPT | Performed by: PHYSICIAN ASSISTANT

## 2021-01-28 PROCEDURE — 3008F BODY MASS INDEX DOCD: CPT | Performed by: PHYSICIAN ASSISTANT

## 2021-01-28 NOTE — PROGRESS NOTES
[facility] - Outpatient Consultation  Wayne Overall 54 y o  female MRN: [id number]  Encounter: [id number]          ASSESSMENT AND PLAN:      1  Gastroesophageal reflux disease, unspecified whether esophagitis present  Patient with history of GERD and gives history of hiatal hernia on prior EGD many years ago  The patient with recent exacerbation of symptoms due to stopping PPI  Recommended different things such as Pepcid complete as well as apple cider vinegar  Currently she would like to continue on Gaviscon and we will plan for EGD  2  Bloating   primarily in the epigastric area,  Plan for EGD and if no pertinent findings may need CT scan for evaluation  She is status post cholecystectomy  3  Family history of colon cancer   we will plan colonoscopy at this time    4  Constipation, unspecified constipation type  Patient will continue on Colace  Discussed with her starting  medication or smooth move tea but she would like to continue on Colace at this time  TSH in the summer of last year was within normal limits  Patient was seen and examined with Dr Melissa Fermin and we will see her at the time of the EGD and colonoscopy     ______________________________________________________________________    HPI:      This is a 49-year-old female who presents today for consultation to EGD and colonoscopy  Patient does have epigastric distress and has stopped taking Prilosec due to potential side effects in September of 2020 but she tapered off the medication and now has had an exacerbation of symptoms  She went to see ENT due to hoarseness as well as globus sensation and sometimes she has difficulty swallowing pills as well as solid foods  She has been following a low acid diet and she did get Gaviscon from the [facility]  She otherwise was prescribed Carafate by her ENT doctor but she did not like this because of the aluminum     She states that she has epigastric bloating sensation and she does give history of a hiatal hernia as well as a history of pancreatitis fatty liver  She states that she underwent a cholecystectomy but around that time she either had an EGD or an ERCP which indicated that she had a malformed stomach as well as hiatal hernia  Patient otherwise states that her father had colon cancer at age 67 and she had a colonoscopy about 10 years ago but she did not have any polyps  Her ENT doctor as well as primary doctor recommended that she get a colonoscopy due to her family history  She also complains of constipation and she had started taking Colace but does not like to take other medications  REVIEW OF SYSTEMS:    CONSTITUTIONAL: Denies any fever, chills, rigors, and weight loss  HEENT: No earache or tinnitus  Denies hearing loss or visual disturbances  CARDIOVASCULAR: No chest pain or palpitations  RESPIRATORY: Denies any cough, hemoptysis, shortness of breath or dyspnea on exertion  GASTROINTESTINAL: As noted in the History of Present Illness  GENITOURINARY: No problems with urination  Denies any hematuria or dysuria  NEUROLOGIC: No dizziness or vertigo, denies headaches  MUSCULOSKELETAL: Denies any muscle or joint pain  SKIN: Denies skin rashes or itching  ENDOCRINE: Denies excessive thirst  Denies intolerance to heat or cold  PSYCHOSOCIAL: Denies depression or anxiety  Denies any recent memory loss         Historical Information   Past Medical History:   Diagnosis Date    GERD (gastroesophageal reflux disease)     Irritable bowel syndrome      Past Surgical History:   Procedure Laterality Date    GALLBLADDER SURGERY      HYSTERECTOMY  2010    NASAL SEPTUM SURGERY       Social History   Social History     Substance and Sexual Activity   Alcohol Use Not Currently     Social History     Substance and Sexual Activity   Drug Use Never     Social History     Tobacco Use   Smoking Status Never Smoker   Smokeless Tobacco Never Used     Family History   Problem Relation Age of Onset    Heart attack Mother     Stroke Mother     Colon cancer Father     Diabetes Father     No Known Problems Sister     No Known Problems Daughter     No Known Problems Maternal Grandmother     No Known Problems Maternal Grandfather     Breast cancer Paternal Grandmother     No Known Problems Paternal Grandfather        Meds/Allergies       Current Outpatient Medications:     cetirizine (ZyrTEC) 10 mg tablet    cyclobenzaprine (FLEXERIL) 10 mg tablet    dicyclomine (BENTYL) 10 mg capsule    levothyroxine 75 mcg tablet    MILK THISTLE PO    Probiotic Product (PROBIOTIC PO)    sucralfate (CARAFATE) 1 g tablet    AZELASTINE-FLUTICASONE NA    escitalopram (LEXAPRO) 20 mg tablet    Inositol Niacinate 500 MG CAPS    mupirocin (BACTROBAN) 2 % ointment    Omega-3 Fatty Acids (FISH OIL PO)    zolpidem (AMBIEN) 10 mg tablet    Allergies   Allergen Reactions    Aspirin Other (See Comments)     Reaction Date: [id number];       Erythromycin Base Other (See Comments)    Iodine Other (See Comments)     ivp dye    Morphine Headache and Other (See Comments)     migraine headaches      Prochlorperazine Other (See Comments)    Tetracycline Other (See Comments)     Reaction Date: 08Jun2011;       Metoclopramide Anxiety, Other (See Comments) and Rash     Reaction Date: 08Jun2011;              Objective     Blood pressure 113/82, pulse 96, height 4' 11" (1 499 m), weight 61 7 kg (136 lb)  Body mass index is 27 47 kg/m²  PHYSICAL EXAM:      General Appearance:   Alert, cooperative, no distress   HEENT:   Normocephalic, atraumatic, anicteric      Neck:  Supple, symmetrical, trachea midline   Lungs:   Clear to auscultation bilaterally; no rales, rhonchi or wheezing; respirations unlabored    Heart[de-identified]   Regular rate and rhythm; no murmur, rub, or gallop     Abdomen:   Soft, non-tender, non-distended; normal bowel sounds; no masses, no organomegaly    Genitalia:   Deferred    Rectal:   Deferred  Extremities:  No cyanosis, clubbing or edema    Pulses:  2+ and symmetric    Skin:  No jaundice, rashes, or lesions    Lymph nodes:  No palpable cervical lymphadenopathy        Lab Results:   No visits with results within 1 Day(s) from this visit  Latest known visit with results is:   Orders Only on 01/07/2021   Component Date Value    SARS-CoV-2  01/07/2021 Detected*         Radiology Results:   No results found

## 2021-03-02 ENCOUNTER — HOSPITAL ENCOUNTER (OUTPATIENT)
Dept: GASTROENTEROLOGY | Facility: AMBULATORY SURGERY CENTER | Age: 56
Discharge: HOME | End: 2021-03-02
Payer: COMMERCIAL

## 2021-03-02 ENCOUNTER — ANESTHESIA EVENT (OUTPATIENT)
Dept: GASTROENTEROLOGY | Facility: AMBULATORY SURGERY CENTER | Age: 56
End: 2021-03-02

## 2021-03-02 ENCOUNTER — ANESTHESIA (OUTPATIENT)
Dept: GASTROENTEROLOGY | Facility: AMBULATORY SURGERY CENTER | Age: 56
End: 2021-03-02
Payer: COMMERCIAL

## 2021-03-02 VITALS
OXYGEN SATURATION: 100 % | HEART RATE: 71 BPM | WEIGHT: 135 LBS | RESPIRATION RATE: 18 BRPM | DIASTOLIC BLOOD PRESSURE: 85 MMHG | TEMPERATURE: 97.3 F | SYSTOLIC BLOOD PRESSURE: 131 MMHG | HEIGHT: 59 IN | BODY MASS INDEX: 27.21 KG/M2

## 2021-03-02 DIAGNOSIS — Z80.0 FAMILY HISTORY OF COLON CANCER: ICD-10-CM

## 2021-03-02 DIAGNOSIS — K21.9 GASTROESOPHAGEAL REFLUX DISEASE, UNSPECIFIED WHETHER ESOPHAGITIS PRESENT: ICD-10-CM

## 2021-03-02 DIAGNOSIS — R14.0 BLOATING: ICD-10-CM

## 2021-03-02 DIAGNOSIS — K29.00 ACUTE SUPERFICIAL GASTRITIS WITHOUT HEMORRHAGE: Primary | ICD-10-CM

## 2021-03-02 PROCEDURE — 88305 TISSUE EXAM BY PATHOLOGIST: CPT | Performed by: PATHOLOGY

## 2021-03-02 PROCEDURE — 00813 ANES UPR LWR GI NDSC PX: CPT | Performed by: NURSE ANESTHETIST, CERTIFIED REGISTERED

## 2021-03-02 PROCEDURE — 45385 COLONOSCOPY W/LESION REMOVAL: CPT | Performed by: INTERNAL MEDICINE

## 2021-03-02 PROCEDURE — 45380 COLONOSCOPY AND BIOPSY: CPT | Performed by: INTERNAL MEDICINE

## 2021-03-02 PROCEDURE — 43239 EGD BIOPSY SINGLE/MULTIPLE: CPT | Performed by: INTERNAL MEDICINE

## 2021-03-02 PROCEDURE — 43251 EGD REMOVE LESION SNARE: CPT | Performed by: INTERNAL MEDICINE

## 2021-03-02 RX ORDER — SODIUM CHLORIDE 9 MG/ML
20 INJECTION, SOLUTION INTRAVENOUS CONTINUOUS
Status: DISCONTINUED | OUTPATIENT
Start: 2021-03-02 | End: 2021-03-06 | Stop reason: HOSPADM

## 2021-03-02 RX ORDER — PROPOFOL 10 MG/ML
INJECTION, EMULSION INTRAVENOUS AS NEEDED
Status: DISCONTINUED | OUTPATIENT
Start: 2021-03-02 | End: 2021-03-02

## 2021-03-02 RX ORDER — SODIUM CHLORIDE 9 MG/ML
30 INJECTION, SOLUTION INTRAVENOUS CONTINUOUS
Status: DISCONTINUED | OUTPATIENT
Start: 2021-03-02 | End: 2021-03-06 | Stop reason: HOSPADM

## 2021-03-02 RX ORDER — SODIUM CHLORIDE 9 MG/ML
INJECTION, SOLUTION INTRAVENOUS CONTINUOUS PRN
Status: DISCONTINUED | OUTPATIENT
Start: 2021-03-02 | End: 2021-03-02

## 2021-03-02 RX ORDER — FAMOTIDINE 20 MG/1
20 TABLET, FILM COATED ORAL 2 TIMES DAILY
Qty: 60 TABLET | Refills: 2 | Status: SHIPPED | OUTPATIENT
Start: 2021-03-02 | End: 2021-05-31

## 2021-03-02 RX ADMIN — PROPOFOL 50 MG: 10 INJECTION, EMULSION INTRAVENOUS at 11:27

## 2021-03-02 RX ADMIN — PROPOFOL 150 MG: 10 INJECTION, EMULSION INTRAVENOUS at 11:23

## 2021-03-02 RX ADMIN — PROPOFOL 25 MG: 10 INJECTION, EMULSION INTRAVENOUS at 11:44

## 2021-03-02 RX ADMIN — PROPOFOL 75 MG: 10 INJECTION, EMULSION INTRAVENOUS at 11:33

## 2021-03-02 RX ADMIN — PROPOFOL 25 MG: 10 INJECTION, EMULSION INTRAVENOUS at 11:35

## 2021-03-02 RX ADMIN — SODIUM CHLORIDE: 9 INJECTION, SOLUTION INTRAVENOUS at 11:22

## 2021-03-02 RX ADMIN — PROPOFOL 75 MG: 10 INJECTION, EMULSION INTRAVENOUS at 11:41

## 2021-03-02 NOTE — DISCHARGE INSTRUCTIONS
Sleep Apnea   AMBULATORY CARE:   Sleep apnea  is a condition that causes you to stop breathing for 10 seconds or longer during sleep  Obstructive sleep apnea is the most common kind  The muscles and tissues around your throat relax and block air from passing through  Obesity, use of alcohol or cigarettes, or a family history are common causes  Central sleep apnea means your brain does not send signals to the muscles that control breathing  You do not take a breath even though your airway is open  Common causes include medical conditions such as heart failure, being older than 65, or use of opioids  Common signs and symptoms include the following:   · Snoring loudly, snorting, gasping or choking while you sleep, and waking up suddenly because of these    · A hard time thinking, remembering things, or focusing on your tasks the following day    · Headache or nausea    · Waking up often during the night to urinate    · Feeling sleepy, slow, and tired during the day    Call your local emergency number (911 in the [address] [facility]) if:   · You have chest pain or trouble breathing  Call your doctor if:   · You feel tired or depressed  · You have trouble staying awake during the day  · You have trouble thinking clearly  · You have questions or concerns about your condition or care  How sleep apnea is diagnosed:   · Your healthcare provider will ask about your signs and symptoms, when they began, and how bad they are  He or she may ask about medical conditions you have and what medicines you take  Tell your healthcare provider if you smoke and if anyone in your family has sleep apnea  Your healthcare provider may ask the person who sleeps beside you about your signs  · You may need to wear a device that monitors the oxygen level in your blood while you sleep  You may need to have a sleep study (polysomnography) if you have daytime sleepiness   A sleep study helps show your brain, heart, and respiratory system are working during sleep  Sleep studies may monitor the stages of sleep, oxygen levels, body position, eye movement, and snoring  Treatment  depends on the kind of sleep apnea you have:  · A machine  may be used to help you get more air during sleep  A mask may be placed over your nose and mouth, or just your nose  The mask is hooked to the machine  You will get air through the mask  ? A continuous positive airway pressure (CPAP) machine  is used to keep your airway open during sleep  The machine blows a gentle stream of air into the mask when you breathe  This helps keep your airway open so you can breathe more regularly  Extra oxygen may be given through the machine  ? A bilevel positive airway pressure (BiPAP) machine  gives air but lowers the pressure when you breathe out  ? An adaptive servo-ventilator  is a machine that only gives air when it senses you are not breathing  · A mouth device  that looks like a mouth guard stops your tongue and other tissues from blocking airflow  · Surgery  may be needed to remove extra tissues that block your mouth, throat, or nose  Manage or prevent sleep apnea:   · Do not smoke  Nicotine and other chemicals in cigarettes and cigars can cause lung damage  Ask your healthcare provider for information if you currently smoke and need help to quit  E-cigarettes or smokeless tobacco still contain nicotine  Talk to your healthcare provider before you use these products  · Do not drink alcohol or take sedative medicine before you go to sleep  Alcohol and sedatives can relax the muscles and tissues around your throat  This can block the airflow to your lungs  · Maintain a healthy weight  Your healthcare provider can tell you what weight is healthy for you  He or she can help you create a weight loss plan, if needed  The plan will include healthy foods and regular exercise to help you reach your healthy weight   Exercise can also help you sleep and may reduce stress  · Sleep on your side or use pillows designed to prevent sleep apnea  This prevents your tongue or other tissues from blocking your throat  You can also raise the head of your bed  Follow up with your doctor as directed: You may need to have blood tests during your follow-up visits  You will need to work with your healthcare provider to find the right breathing support equipment and settings for you  Write down your questions so you remember to ask them during your visits  © Copyright [address] Information is for End User's use only and may not be sold, redistributed or otherwise used for commercial purposes  All illustrations and images included in CareNotes® are the copyrighted property of A D A M , Inc  or [facility]  The above information is an  only  It is not intended as medical advice for individual conditions or treatments  Talk to your doctor, nurse or pharmacist before following any medical regimen to see if it is safe and effective for you  Gastric Polyps   WHAT YOU NEED TO KNOW:   What are gastric polyps? Gastric polyps are growths that form in the lining of your stomach  They are not cancerous, but certain types of polyps can change into cancer  What puts me at risk for gastric polyps? · Chronic gastritis caused by NSAIDs use or ulcers    · Long-term use of proton pump inhibitor medicines (used to decrease stomach acid)    · An infection in your stomach caused by H  pylori bacteria    What are the symptoms of gastric polyps? You may have no symptoms  Large polyps may cause any of the following:  · Abdominal pain    · Indigestion    · Vomiting after meals or vomiting blood    · Dark or bloody bowel movements    How are gastric polyps diagnosed? Gastric polyps are usually found during an endoscopy for another reason  All or part of the polyp will be removed during the test  Your healthcare provider may also remove tissue from your stomach   The polyps and tissue are sent to the lab for testing  How are gastric polyps treated? Some types of polyps go away on their own  Other types may be removed if they are large, you have symptoms, or abnormal cells are found  Large polyps and abnormal cells increase your risk for cancer  You may also need antibiotics if you have an infection caused by H  pylori bacteria  Part of your stomach may be removed if the polyps cannot be removed and abnormal cells are found  When should I seek immediate care? · You have blood in your vomit  · You have dark or bloody bowel movements  · You have severe pain in your abdomen that does not go away after you take medicine  When should I contact my healthcare provider? · You have indigestion that does not go away with treatment  · You vomit after meals  · You have questions or concerns about your condition or care  CARE AGREEMENT:   You have the right to help plan your care  Learn about your health condition and how it may be treated  Discuss treatment options with your healthcare providers to decide what care you want to receive  You always have the right to refuse treatment  The above information is an  only  It is not intended as medical advice for individual conditions or treatments  Talk to your doctor, nurse or pharmacist before following any medical regimen to see if it is safe and effective for you  © Copyright [address] Information is for End User's use only and may not be sold, redistributed or otherwise used for commercial purposes  All illustrations and images included in CareNotes® are the copyrighted property of [employer] Inc  or Malena Flores  Diverticulosis   WHAT YOU NEED TO KNOW:   Diverticulosis is a condition that causes small pockets called diverticula to form in your intestine  These pockets make it difficult for bowel movements to pass through your digestive system    DISCHARGE INSTRUCTIONS:   Return to the emergency department if:   · You have severe pain on the left side of your lower abdomen  · Your bowel movements are bright or dark red  Contact your healthcare provider if:   · You have a fever and chills  · You feel dizzy or lightheaded  · You have nausea, or you are vomiting  · You have a change in your bowel movements  · You have questions or concerns about your condition or care  Medicines:   · Medicines  to soften your bowel movements may be given  You may also need medicines to treat symptoms such as bloating and pain  · Take your medicine as directed  Contact your healthcare provider if you think your medicine is not helping or if you have side effects  Tell him or her if you are allergic to any medicine  Keep a list of the medicines, vitamins, and herbs you take  Include the amounts, and when and why you take them  Bring the list or the pill bottles to follow-up visits  Carry your medicine list with you in case of an emergency  Self-care: The goal of treatment is to manage any symptoms you have and prevent other problems such as diverticulitis  Diverticulitis is swelling or infection of the diverticula  Your healthcare provider may recommend any of the following:  · Eat a variety of high-fiber foods  High-fiber foods help you have regular bowel movements  High-fiber foods include cooked beans, fruits, vegetables, and some cereals  Most adults need 25 to 35 grams of fiber each day  Your healthcare provider may recommend that you have more  Ask your healthcare provider how much fiber you need  Increase fiber slowly  You may have abdominal discomfort, bloating, and gas if you add fiber to your diet too quickly  You may need to take a fiber supplement if you are not getting enough fiber from food  · Drink liquids as directed  You may need to drink 2 to 3 liters (8 to 12 cups) of liquids every day   Ask your healthcare provider how much liquid to drink each day and which liquids are best for you  · Apply heat  on your abdomen for 20 to 30 minutes every 2 hours for as many days as directed  Heat helps decrease pain and muscle spasms  Help prevent diverticulitis or other symptoms: The following may help decrease your risk for diverticulitis or symptoms, such as bleeding  Talk to your provider about these or other things you can do to prevent problems that may occur with diverticulosis  · Exercise regularly  Ask your healthcare provider about the best exercise plan for you  Exercise can help you have regular bowel movements  Get 30 minutes of exercise on most days of the week  · Maintain a healthy weight  Ask your healthcare provider how much you should weigh  Ask him or her to help you create a weight loss plan if you are overweight  · Do not smoke  Nicotine and other chemicals in cigarettes increase your risk for diverticulitis  Ask your healthcare provider for information if you currently smoke and need help to quit  E-cigarettes or smokeless tobacco still contain nicotine  Talk to your healthcare provider before you use these products  · Ask your healthcare provider if it is safe to take NSAIDs  NSAIDs may increase your risk of diverticulitis  Follow up with your healthcare provider as directed:  Write down your questions so you remember to ask them during your visits  © Copyright [address] [place] [address] Drive Information is for End User's use only and may not be sold, redistributed or otherwise used for commercial purposes  All illustrations and images included in CareNotes® are the copyrighted property of A [employer]  or [facility] Hayley Rogers   The above information is an  only  It is not intended as medical advice for individual conditions or treatments  Talk to your doctor, nurse or pharmacist before following any medical regimen to see if it is safe and effective for you  Hemorrhoids   WHAT YOU NEED TO KNOW:   What are hemorrhoids?   Hemorrhoids are swollen blood vessels inside your rectum (internal hemorrhoids) or on your anus (external hemorrhoids)  Sometimes a hemorrhoid may prolapse  This means it extends out of your anus  What increases my risk for hemorrhoids? · Pregnancy or obesity    · Straining or sitting for a long time during bowel movements    · Liver disease    · Weak muscles around the anus caused by older age, rectal surgery, or anal intercourse    · A lack of physical activity    · Chronic diarrhea or constipation    · A low-fiber diet    What are the signs and symptoms of hemorrhoids? · Pain or itching around your anus or inside your rectum    · Swelling or bumps around your anus    · Bright red blood in your bowel movement, on the toilet paper, or in the toilet bowl    · Tissue bulging out of your anus (prolapsed hemorrhoids)    · Incontinence (poor control over urine or bowel movements)    How are hemorrhoids diagnosed? Your healthcare provider will ask about your symptoms, the foods you eat, and your bowel movements  He or she will examine your anus for external hemorrhoids  You may need the following:  · A digital rectal exam  is a test to check for hemorrhoids  Your healthcare provider will put a gloved finger inside your anus to feel for the hemorrhoids  · An anoscopy  is a test that uses a scope (small tube with a light and camera on the end) to look at your hemorrhoids  How are hemorrhoids treated? Treatment will depend on your symptoms  You may need any of the following:  · Medicines  can help decrease pain and swelling, and soften your bowel movement  The medicine may be a pill, pad, cream, or ointment  · Procedures  may be used to shrink or remove your hemorrhoid  Examples include rubber-band ligation, sclerotherapy, and photocoagulation  These procedures may be done in your healthcare provider's office  Ask your healthcare provider for more information about these procedures       · Surgery  may be needed to shrink or remove your hemorrhoids  How can I manage my symptoms? · Apply ice on your anus for 15 to 20 minutes every hour or as directed  Use an ice pack, or put crushed ice in a plastic bag  Cover it with a towel before you apply it to your anus  Ice helps prevent tissue damage and decreases swelling and pain  · Take a sitz bath  Fill a bathtub with 4 to 6 inches of warm water  You may also use a sitz bath pan that fits inside a toilet bowl  Sit in the sitz bath for 15 minutes  Do this 3 times a day, and after each bowel movement  The warm water can help decrease pain and swelling  · Keep your anal area clean  Gently wash the area with warm water daily  Soap may irritate the area  After a bowel movement, wipe with moist towelettes or wet toilet paper  Dry toilet paper can irritate the area  How can I help prevent hemorrhoids? · Do not strain to have a bowel movement  Do not sit on the toilet too long  These actions can increase pressure on the tissues in your rectum and anus  · Drink plenty of liquids  Liquids can help prevent constipation  Ask how much liquid to drink each day and which liquids are best for you  · Eat a variety of high-fiber foods  Examples include fruits, vegetables, and whole grains  Ask your healthcare provider how much fiber you need each day  You may need to take a fiber supplement  · Exercise as directed  Exercise, such as walking, may make it easier to have a bowel movement  Ask your healthcare provider to help you create an exercise plan  · Do not have anal sex  Anal sex can weaken the skin around your rectum and anus  · Avoid heavy lifting  This can cause straining and increase your risk for another hemorrhoid  When should I seek immediate care? · You have severe pain in your rectum or around your anus  · You have severe pain in your abdomen and you are vomiting       · You have bleeding from your anus that soaks through your underwear  When should I contact my healthcare provider? · You have frequent and painful bowel movements  · Your hemorrhoid looks or feels more swollen than usual      · You do not have a bowel movement for 2 days or more  · You see or feel tissue coming through your anus  · You have questions or concerns about your condition or care  CARE AGREEMENT:   You have the right to help plan your care  Learn about your health condition and how it may be treated  Discuss treatment options with your healthcare providers to decide what care you want to receive  You always have the right to refuse treatment  The above information is an  only  It is not intended as medical advice for individual conditions or treatments  Talk to your doctor, nurse or pharmacist before following any medical regimen to see if it is safe and effective for you  © Copyright [address] Information is for End User's use only and may not be sold, redistributed or otherwise used for commercial purposes   All illustrations and images included in CareNotes® are the copyrighted property of A [employer] , [employer]  or [address] [place] [address]

## 2021-03-02 NOTE — H&P
History and Physical - [facility]  Сергей Hammonds 54 y o  female MRN: [id number]                  HPI: Сергей Hammonds is a 54y o  year old female who presents for EGD and colonoscopy  History of reflux, bloating, family history colon cancer father in his 76s      REVIEW OF SYSTEMS: Per the HPI, and otherwise unremarkable      Historical Information   Past Medical History:   Diagnosis Date    Fibromyalgia, primary     GERD (gastroesophageal reflux disease)     Hiatal hernia     Irritable bowel syndrome     Sleep apnea      Past Surgical History:   Procedure Laterality Date    COLONOSCOPY      GALLBLADDER SURGERY      HYSTERECTOMY  2010    NASAL SEPTUM SURGERY      UPPER GASTROINTESTINAL ENDOSCOPY       Social History   Social History     Substance and Sexual Activity   Alcohol Use Not Currently     Social History     Substance and Sexual Activity   Drug Use Never     Social History     Tobacco Use   Smoking Status Never Smoker   Smokeless Tobacco Never Used     Family History   Problem Relation Age of Onset    Heart attack Mother     Stroke Mother     Colon cancer Father     Diabetes Father     Cancer Father     No Known Problems Sister     No Known Problems Daughter     No Known Problems Maternal Grandmother     No Known Problems Maternal Grandfather     Breast cancer Paternal Grandmother     No Known Problems Paternal Grandfather        Meds/Allergies       Current Outpatient Medications:     cetirizine (ZyrTEC) 10 mg tablet    cyclobenzaprine (FLEXERIL) 10 mg tablet    escitalopram (LEXAPRO) 20 mg tablet    Flaxseed, Linseed, (FLAXSEED OIL PO)    levothyroxine 75 mcg tablet    MILK THISTLE PO    mupirocin (BACTROBAN) 2 % ointment    Probiotic Product (PROBIOTIC PO)    sucralfate (CARAFATE) 1 g tablet    zolpidem (AMBIEN) 10 mg tablet    dicyclomine (BENTYL) 10 mg capsule    Allergies   Allergen Reactions    Aspirin Other (See Comments)     Reaction Date: [id number];  Erythromycin Base Other (See Comments)    Iodine (Food Allergy) Other (See Comments)     ivp dye    Morphine Headache and Other (See Comments)     migraine headaches      Prochlorperazine Other (See Comments)    Tetracycline Other (See Comments)     Reaction Date: 08Jun2011;       Metoclopramide Anxiety, Other (See Comments) and Rash     Reaction Date: 08Jun2011;          Objective     /72   Pulse 92   Temp (!) 97 3 °F (36 3 °C) (Temporal)   Resp 18   Ht 4' 11" (1 499 m)   Wt 61 2 kg (135 lb)   SpO2 100%   BMI 27 27 kg/m²       PHYSICAL EXAM    Gen: NAD  CV: RRR  CHEST: Clear  ABD: soft, NT/ND  EXT: no edema      ASSESSMENT/PLAN:  This is a 54y o  year old female here for EGD and colonoscopy, and she is stable and optimized for her procedure

## 2021-03-02 NOTE — INTERVAL H&P NOTE
H&P reviewed  After examining the patient I find no changes in the patients condition since the H&P had been written      Vitals:    03/02/21 1025   BP: 111/72   Pulse: 92   Resp: 18   Temp: (!) 97 3 °F (36 3 °C)   SpO2: 100%

## 2021-03-05 NOTE — RESULT ENCOUNTER NOTE
Please inform patient that there biopsies were benign    Repeat colonoscopy 1 year repeat EGD 3 years

## 2021-03-07 ENCOUNTER — OFFICE VISIT (OUTPATIENT)
Dept: URGENT CARE | Facility: CLINIC | Age: 56
End: 2021-03-07
Payer: COMMERCIAL

## 2021-03-07 ENCOUNTER — APPOINTMENT (OUTPATIENT)
Dept: RADIOLOGY | Facility: CLINIC | Age: 56
End: 2021-03-07
Payer: COMMERCIAL

## 2021-03-07 VITALS
SYSTOLIC BLOOD PRESSURE: 120 MMHG | RESPIRATION RATE: 20 BRPM | OXYGEN SATURATION: 98 % | TEMPERATURE: 97.8 F | HEART RATE: 88 BPM | BODY MASS INDEX: 27.21 KG/M2 | HEIGHT: 59 IN | DIASTOLIC BLOOD PRESSURE: 81 MMHG | WEIGHT: 135 LBS

## 2021-03-07 DIAGNOSIS — M54.50 ACUTE MIDLINE LOW BACK PAIN WITHOUT SCIATICA: Primary | ICD-10-CM

## 2021-03-07 DIAGNOSIS — M54.50 ACUTE MIDLINE LOW BACK PAIN WITHOUT SCIATICA: ICD-10-CM

## 2021-03-07 PROCEDURE — 72100 X-RAY EXAM L-S SPINE 2/3 VWS: CPT

## 2021-03-07 PROCEDURE — G0382 LEV 3 HOSP TYPE B ED VISIT: HCPCS | Performed by: PHYSICIAN ASSISTANT

## 2021-03-07 PROCEDURE — 96372 THER/PROPH/DIAG INJ SC/IM: CPT | Performed by: PHYSICIAN ASSISTANT

## 2021-03-07 RX ORDER — KETOROLAC TROMETHAMINE 30 MG/ML
30 INJECTION, SOLUTION INTRAMUSCULAR; INTRAVENOUS ONCE
Status: COMPLETED | OUTPATIENT
Start: 2021-03-07 | End: 2021-03-07

## 2021-03-07 RX ORDER — METHYLPREDNISOLONE 4 MG/1
TABLET ORAL
Qty: 21 TABLET | Refills: 0 | Status: SHIPPED | OUTPATIENT
Start: 2021-03-07 | End: 2021-04-01 | Stop reason: ALTCHOICE

## 2021-03-07 RX ORDER — METHOCARBAMOL 500 MG/1
1000 TABLET, FILM COATED ORAL 4 TIMES DAILY
Qty: 40 TABLET | Refills: 0 | Status: SHIPPED | OUTPATIENT
Start: 2021-03-07 | End: 2021-04-01 | Stop reason: ALTCHOICE

## 2021-03-07 RX ADMIN — KETOROLAC TROMETHAMINE 30 MG: 30 INJECTION, SOLUTION INTRAMUSCULAR; INTRAVENOUS at 14:47

## 2021-03-07 NOTE — PROGRESS NOTES
[facility]        NAME: Mirian Hart is a 54 y o  female  : 1965    MRN: [id number]  DATE: 2021  TIME: 3:30 PM    Assessment and Plan   Acute midline low back pain without sciatica [M54 5]  1  Acute midline low back pain without sciatica  ketorolac (TORADOL) injection 30 mg    XR spine lumbar 2 or 3 views injury    methylPREDNISolone 4 MG tablet therapy pack    Ambulatory referral to Orthopedic Surgery    methocarbamol (ROBAXIN) 500 mg tablet     No concern for cauda equina syndrome   Hx of disk herniation   Will call pt with Xray results and potentially have her f/u with ortho     Patient Instructions   Patient Instructions   Take the steroid as directed   Take the muscle relaxer up to 4 times a day  DO Not take with another muscle relaxer  Do not drive on this medication  Follow up with ortho    If you have any increased symptoms go to the ER   Heat for 20 minutes 3-4 times a day           Follow up with PCP in 3-5 days  Proceed to  ER if symptoms worsen  Chief Complaint     Chief Complaint   Patient presents with    Back Pain     started 2 weeks ago, pt was shoveling wet snow and had low back pain  was getting better, last night pt was loading boxes and felt a "pop" in her lower back  pain is now radiating to pelvis area its hard for her to walk and sit and stand  ice used, robaxcin 1500mg not helping  no numbness or tingling, no issues with voiding         History of Present Illness       The pt is a 49-year-old female presenting with two weeks of low back pain  She had been shoveling wet snow and developed back pain shortly after  She reports that it had been improving until last night  She was loading moving boxes and felt a pop in her lower back  She reports now that it is radiating into her pelvis  It is painful for her to walk and sit  She has been using ice and took 1500 mg of Robaxin  No bladder symptoms  No saddle anesthesia, no difficulty urinating, numbness with wiping  No numbness and tingling down the legs  Prior hx of a disk herniation  Review of Systems   Review of Systems   Constitutional: Negative for activity change, appetite change, chills, fatigue and fever  Gastrointestinal: Negative for abdominal pain, diarrhea, nausea and vomiting  Genitourinary: Negative for decreased urine volume, difficulty urinating, hematuria and vaginal bleeding  Musculoskeletal: Positive for back pain, gait problem and myalgias  Negative for arthralgias, joint swelling, neck pain and neck stiffness  Current Medications       Current Outpatient Medications:     cetirizine (ZyrTEC) 10 mg tablet, Take 10 mg by mouth daily, Disp: , Rfl:     cyclobenzaprine (FLEXERIL) 10 mg tablet, Take 10 mg by mouth daily, Disp: , Rfl:     dicyclomine (BENTYL) 10 mg capsule, Take 1 capsule by mouth 3 (three) times a day , Disp: , Rfl:     escitalopram (LEXAPRO) 20 mg tablet, Take 1 tablet (20 mg total) by mouth daily, Disp: 90 tablet, Rfl: 1    famotidine (PEPCID) 20 mg tablet, Take 1 tablet (20 mg total) by mouth 2 (two) times a day, Disp: 60 tablet, Rfl: 2    Flaxseed, Linseed, (FLAXSEED OIL PO), Take by mouth daily, Disp: , Rfl:     levothyroxine 75 mcg tablet, Take 1 tablet (75 mcg total) by mouth daily, Disp: 90 tablet, Rfl: 3    MILK THISTLE PO, Take by mouth, Disp: , Rfl:     mupirocin (BACTROBAN) 2 % ointment, , Disp: , Rfl:     Probiotic Product (PROBIOTIC PO), Take by mouth, Disp: , Rfl:     sucralfate (CARAFATE) 1 g tablet, Take 1 g by mouth 4 (four) times a day, Disp: , Rfl:     methocarbamol (ROBAXIN) 500 mg tablet, Take 2 tablets (1,000 mg total) by mouth 4 (four) times a day for 5 days, Disp: 40 tablet, Rfl: 0    methylPREDNISolone 4 MG tablet therapy pack, Use as directed on package, Disp: 21 tablet, Rfl: 0    zolpidem (AMBIEN) 10 mg tablet, Take 10 mg by mouth daily as needed, Disp: , Rfl:   No current facility-administered medications for this visit       Current Allergies     Allergies as of 03/07/2021 - Reviewed 03/07/2021   Allergen Reaction Noted    Aspirin Other (See Comments) 06/04/2004    Erythromycin base Other (See Comments) 06/04/2004    Iodine Other (See Comments) 06/04/2004    Morphine Headache and Other (See Comments) 09/09/2011    Prochlorperazine Other (See Comments) 06/04/2004    Tetracycline Other (See Comments) 06/04/2004    Metoclopramide Anxiety, Other (See Comments), and Rash 06/04/2004            The following portions of the patient's history were reviewed and updated as appropriate: allergies, current medications, past family history, past medical history, past social history, past surgical history and problem list      Past Medical History:   Diagnosis Date    Fibromyalgia, primary     GERD (gastroesophageal reflux disease)     Hiatal hernia     Irritable bowel syndrome     Sleep apnea        Past Surgical History:   Procedure Laterality Date    COLONOSCOPY      GALLBLADDER SURGERY      HYSTERECTOMY  2010    NASAL SEPTUM SURGERY      UPPER GASTROINTESTINAL ENDOSCOPY         Family History   Problem Relation Age of Onset    Heart attack Mother     Stroke Mother     Colon cancer Father     Diabetes Father     Cancer Father     No Known Problems Sister     No Known Problems Daughter     No Known Problems Maternal Grandmother     No Known Problems Maternal Grandfather     Breast cancer Paternal Grandmother     No Known Problems Paternal Grandfather          Medications have been verified  Objective   /81   Pulse 88   Temp 97 8 °F (36 6 °C) (Tympanic)   Resp 20   Ht 4' 11" (1 499 m)   Wt 61 2 kg (135 lb)   SpO2 98%   BMI 27 27 kg/m²        Physical Exam     Physical Exam  Vitals signs reviewed  Constitutional:       General: She is not in acute distress  Appearance: Normal appearance  She is normal weight  She is not ill-appearing, toxic-appearing or diaphoretic        Comments: Pt standing up in room moving around    HENT:      Head: Normocephalic and atraumatic  Neck:      Musculoskeletal: Normal range of motion and neck supple  No neck rigidity or muscular tenderness  Cardiovascular:      Rate and Rhythm: Normal rate and regular rhythm  Heart sounds: Normal heart sounds  No murmur  No friction rub  No gallop  Pulmonary:      Effort: Pulmonary effort is normal  No respiratory distress  Breath sounds: Normal breath sounds  No stridor  No wheezing, rhonchi or rales  Chest:      Chest wall: No tenderness  Abdominal:      General: Abdomen is flat  Bowel sounds are normal  There is no distension  Palpations: Abdomen is soft  Tenderness: There is no abdominal tenderness  There is no guarding  Musculoskeletal: Normal range of motion  General: Tenderness (Over lumbar spine) present  No swelling or signs of injury  Skin:     General: Skin is warm and dry  Capillary Refill: Capillary refill takes less than 2 seconds  Neurological:      Mental Status: She is alert

## 2021-03-07 NOTE — PATIENT INSTRUCTIONS
Take the steroid as directed   Take the muscle relaxer up to 4 times a day  DO Not take with another muscle relaxer  Do not drive on this medication     Follow up with ortho    If you have any increased symptoms go to the ER   Heat for 20 minutes 3-4 times a day

## 2021-03-08 RX ORDER — CYCLOBENZAPRINE HCL 10 MG
10 TABLET ORAL DAILY
Qty: 30 TABLET | OUTPATIENT
Start: 2021-03-08

## 2021-03-11 ENCOUNTER — OFFICE VISIT (OUTPATIENT)
Dept: FAMILY MEDICINE CLINIC | Facility: CLINIC | Age: 56
End: 2021-03-11
Payer: COMMERCIAL

## 2021-03-11 VITALS
TEMPERATURE: 97.7 F | BODY MASS INDEX: 26.5 KG/M2 | RESPIRATION RATE: 16 BRPM | OXYGEN SATURATION: 98 % | HEIGHT: 60 IN | SYSTOLIC BLOOD PRESSURE: 120 MMHG | HEART RATE: 84 BPM | DIASTOLIC BLOOD PRESSURE: 70 MMHG | WEIGHT: 135 LBS

## 2021-03-11 DIAGNOSIS — M54.50 ACUTE MIDLINE LOW BACK PAIN WITHOUT SCIATICA: Primary | ICD-10-CM

## 2021-03-11 PROCEDURE — 99214 OFFICE O/P EST MOD 30 MIN: CPT | Performed by: FAMILY MEDICINE

## 2021-03-11 PROCEDURE — 3008F BODY MASS INDEX DOCD: CPT | Performed by: FAMILY MEDICINE

## 2021-03-11 PROCEDURE — 1036F TOBACCO NON-USER: CPT | Performed by: FAMILY MEDICINE

## 2021-03-11 RX ORDER — CYCLOBENZAPRINE HCL 10 MG
10 TABLET ORAL 3 TIMES DAILY PRN
Qty: 30 TABLET | Refills: 2 | Status: SHIPPED | OUTPATIENT
Start: 2021-03-11 | End: 2022-04-27 | Stop reason: SDUPTHER

## 2021-03-11 RX ORDER — METHYLPREDNISOLONE 4 MG/1
TABLET ORAL
Qty: 21 EACH | Refills: 0 | Status: SHIPPED | OUTPATIENT
Start: 2021-03-11 | End: 2021-04-01 | Stop reason: ALTCHOICE

## 2021-03-11 NOTE — PROGRESS NOTES
Assessment/Plan:    Discussed diagnostic and treatment options with patient  Patient will repeat Medrol Dosepak and switch from Robaxin to Flexeril 10 mg t i d  p r n , caution regarding drowsiness  Patient will alternate ice and heat for 20 minutes each 3-4 times daily and rest   Patient is being referred for physical therapy evaluation treatment  Return the office 1-2 weeks or call sooner p r n  Monae Zarate Diagnoses and all orders for this visit:    Acute midline low back pain without sciatica  -     cyclobenzaprine (FLEXERIL) 10 mg tablet; Take 1 tablet (10 mg total) by mouth 3 (three) times a day as needed for muscle spasms  -     methylPREDNISolone 4 MG tablet therapy pack; Use as directed on package  -     Ambulatory referral to Physical Therapy; Future          Subjective:      Patient ID: Zack Marquez is a 54 y o  female  Patient complains of bilateral low back pain for the past 3 weeks which initially started after shoveling snow  Pain had improved somewhat until 1 week ago felt sudden onset of back pain all bending  Patient denies any pain, numbness, tingling or weakness radiating into her buttocks or legs  She admits to pain radiating around to her pelvic area  Patient denies any change in bowel or bladder function  Patient was seen at urgent care on 03/07/2021 and had x-ray of her lumbar spine and was started on Medrol Dosepak and Robaxin  Patient states she got some relief initially on Medrol Dosepak on the higher doses but now it is waning  Patient states she has a history of lumbar disc herniation  Patient was referred to orthopedics by urgent care but has not scheduled appointment  Patient requests prescription for Flexeril  Back Pain  This is a recurrent problem  The current episode started 1 to 4 weeks ago  The problem occurs constantly  The problem is unchanged  The pain is present in the lumbar spine (bilat)  The quality of the pain is described as aching   Radiates to: pelvic  The pain is moderate  The pain is the same all the time  The symptoms are aggravated by sitting, bending and twisting  Pertinent negatives include no abdominal pain, bladder incontinence, bowel incontinence, leg pain, numbness, paresthesias, perianal numbness, tingling or weakness  She has tried muscle relaxant, ice, heat and chiropractic manipulation (medrol) for the symptoms  The treatment provided mild relief  The following portions of the patient's history were reviewed and updated as appropriate: allergies, current medications, past family history, past medical history, past social history, past surgical history and problem list     Review of Systems   Gastrointestinal: Negative for abdominal pain and bowel incontinence  Genitourinary: Negative for bladder incontinence  Musculoskeletal: Positive for back pain  Neurological: Negative for tingling, weakness, numbness and paresthesias  Objective:      /70 (BP Location: Left arm, Patient Position: Sitting, Cuff Size: Adult)   Pulse 84   Temp 97 7 °F (36 5 °C)   Resp 16   Ht 5' 0 24" (1 53 m)   Wt 61 2 kg (135 lb)   SpO2 98%   BMI 26 16 kg/m²          Physical Exam  Constitutional:       General: She is not in acute distress  Appearance: Normal appearance  Comments: Patient appears uncomfortable and standing  HENT:      Head: Normocephalic  Mouth/Throat:      Mouth: Mucous membranes are moist    Eyes:      General: No scleral icterus  Conjunctiva/sclera: Conjunctivae normal    Neck:      Musculoskeletal: Neck supple  Cardiovascular:      Rate and Rhythm: Normal rate and regular rhythm  Pulmonary:      Effort: Pulmonary effort is normal       Breath sounds: Normal breath sounds  Abdominal:      Palpations: Abdomen is soft  Tenderness: There is no abdominal tenderness  Musculoskeletal:         General: Tenderness present  Right lower leg: No edema  Left lower leg: No edema        Comments: Positive bilateral lumbosacral tenderness  Positive straight leg raise bilaterally  Lower extremity strength and DTRs intact  Positive forward flexion tenderness  Toe and heel walk intact  Lymphadenopathy:      Cervical: No cervical adenopathy  Skin:     General: Skin is warm and dry  Neurological:      Mental Status: She is alert and oriented to person, place, and time  Motor: No weakness  Deep Tendon Reflexes: Reflexes normal    Psychiatric:         Mood and Affect: Mood normal          Behavior: Behavior normal          Thought Content:  Thought content normal          Judgment: Judgment normal

## 2021-04-01 ENCOUNTER — OFFICE VISIT (OUTPATIENT)
Dept: FAMILY MEDICINE CLINIC | Facility: CLINIC | Age: 56
End: 2021-04-01
Payer: COMMERCIAL

## 2021-04-01 VITALS
TEMPERATURE: 97.9 F | BODY MASS INDEX: 25.93 KG/M2 | OXYGEN SATURATION: 99 % | WEIGHT: 133.8 LBS | SYSTOLIC BLOOD PRESSURE: 108 MMHG | DIASTOLIC BLOOD PRESSURE: 76 MMHG | HEART RATE: 84 BPM

## 2021-04-01 DIAGNOSIS — F41.1 GENERALIZED ANXIETY DISORDER: ICD-10-CM

## 2021-04-01 DIAGNOSIS — L25.5 CONTACT DERMATITIS DUE TO PLANTS, EXCEPT FOOD, UNSPECIFIED CONTACT DERMATITIS TYPE: Primary | ICD-10-CM

## 2021-04-01 DIAGNOSIS — E03.9 ACQUIRED HYPOTHYROIDISM: ICD-10-CM

## 2021-04-01 PROCEDURE — 99214 OFFICE O/P EST MOD 30 MIN: CPT | Performed by: FAMILY MEDICINE

## 2021-04-01 RX ORDER — PREDNISONE 10 MG/1
TABLET ORAL
Qty: 33 TABLET | Refills: 1 | Status: SHIPPED | OUTPATIENT
Start: 2021-04-01 | End: 2021-06-24 | Stop reason: ALTCHOICE

## 2021-04-01 RX ORDER — PREDNISONE 10 MG/1
TABLET ORAL
Qty: 33 TABLET | Refills: 0 | Status: SHIPPED | OUTPATIENT
Start: 2021-04-01 | End: 2021-04-01 | Stop reason: SDUPTHER

## 2021-04-01 RX ORDER — VENLAFAXINE HYDROCHLORIDE 37.5 MG/1
37.5 CAPSULE, EXTENDED RELEASE ORAL
Qty: 90 CAPSULE | Refills: 3 | Status: SHIPPED | OUTPATIENT
Start: 2021-04-01 | End: 2022-03-04 | Stop reason: SDUPTHER

## 2021-04-01 NOTE — ASSESSMENT & PLAN NOTE
Recommend switching Lexapro to venlafaxine  37 mg daily  Consider follow-up with office if no improvement or worsening symptoms and consider increasing dose if needed  This may help with pain as well as anxiety

## 2021-04-01 NOTE — PROGRESS NOTES
Assessment/Plan:     1  Contact dermatitis due to plants, except food, unspecified contact dermatitis type  -     predniSONE 10 mg tablet; 6 tablets daily, all at one time, with food  Then on day #4 decrease by 1 pill each day until finished  2  Generalized anxiety disorder  Assessment & Plan:    Recommend switching Lexapro to venlafaxine  37 mg daily  Consider follow-up with office if no improvement or worsening symptoms and consider increasing dose if needed  This may help with pain as well as anxiety  Orders:  -     venlafaxine (EFFEXOR-XR) 37 5 mg 24 hr capsule; Take 1 capsule (37 5 mg total) by mouth daily with breakfast    3  Acquired hypothyroidism  -     CBC and differential  -     Comprehensive metabolic panel  -     Lipid Panel with Direct LDL reflex  -     TSH, 3rd generation with Free T4 reflex; Future        Subjective:      Patient ID: Yoselin Schmidt is a 54 y o  female  Patient is seen for poison ivy rash to the bilateral forearms  She was doing yd work and rash  About 5 days ago  Symptoms are mild  She also has history of mild-to-moderate anxiety and would like to consider switching medication as she has history of fibromyalgia symptoms and would like to try medication for this             The following portions of the patient's history were reviewed and updated as appropriate: allergies, current medications, past family history, past medical history, past social history, past surgical history, and problem list     Review of Systems      Objective:      /76 (BP Location: Left arm, Patient Position: Sitting, Cuff Size: Adult)   Pulse 84   Temp 97 9 °F (36 6 °C) (Temporal)   Wt 60 7 kg (133 lb 12 8 oz)   SpO2 99%   Breastfeeding No   BMI 25 93 kg/m²          Physical Exam

## 2021-04-12 DIAGNOSIS — L25.5 CONTACT DERMATITIS DUE TO PLANTS, EXCEPT FOOD, UNSPECIFIED CONTACT DERMATITIS TYPE: ICD-10-CM

## 2021-04-12 RX ORDER — PREDNISONE 10 MG/1
TABLET ORAL
Qty: 33 TABLET | Refills: 0 | OUTPATIENT
Start: 2021-04-12

## 2021-04-14 ENCOUNTER — OFFICE VISIT (OUTPATIENT)
Dept: FAMILY MEDICINE CLINIC | Facility: CLINIC | Age: 56
End: 2021-04-14
Payer: COMMERCIAL

## 2021-04-14 VITALS
HEART RATE: 97 BPM | DIASTOLIC BLOOD PRESSURE: 80 MMHG | TEMPERATURE: 98.2 F | BODY MASS INDEX: 26.98 KG/M2 | WEIGHT: 133.82 LBS | HEIGHT: 59 IN | SYSTOLIC BLOOD PRESSURE: 124 MMHG | OXYGEN SATURATION: 96 %
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
## 2021-04-14 DIAGNOSIS — L03.114 CELLULITIS OF LEFT HAND: Primary | ICD-10-CM

## 2021-04-14 DIAGNOSIS — S68.012D AMPUTATION OF LEFT THUMB, SUBSEQUENT ENCOUNTER: ICD-10-CM

## 2021-04-14 PROBLEM — S68.012A: Status: ACTIVE | Noted: 2021-04-14

## 2021-04-14 PROCEDURE — 99213 OFFICE O/P EST LOW 20 MIN: CPT | Performed by: FAMILY MEDICINE

## 2021-04-14 PROCEDURE — 1036F TOBACCO NON-USER: CPT | Performed by: FAMILY MEDICINE

## 2021-04-14 PROCEDURE — 3008F BODY MASS INDEX DOCD: CPT | Performed by: FAMILY MEDICINE

## 2021-04-14 RX ORDER — IBUPROFEN AND FAMOTIDINE 26.6; 8 MG/1; MG/1
TABLET, FILM COATED ORAL
Qty: 30 TABLET | Refills: 0 | Status: SHIPPED | OUTPATIENT
Start: 2021-04-14 | End: 2021-06-24 | Stop reason: ALTCHOICE

## 2021-04-14 NOTE — PROGRESS NOTES
Assessment/Plan: treatment options reviewed  She is due to see hand surgeon in 2 days  She will monitor for any signs or symptoms of infection  Son's dog is being put down tomorrow  1  Cellulitis of left hand  -     Ibuprofen-Famotidine 800-26 6 MG TABS; 1 by mouth twice daily    2  Amputation of left thumb, subsequent encounter    3  Dog bite, subsequent encounter          Subjective:      Patient ID: Jordan Sousa is a 64 y o  female  Patient is seen today for evaluation of left hand after recent hospitalization and surgery  She was bitten by her son's dog  This apparently is the 2nd time this dog has been someone  Patient states that she was on Augmentin and recently finished the medication  She tolerated it well  No recent fevers  BMI Counseling: Body mass index is 27 03 kg/m²  The BMI is above normal  Nutrition recommendations include decreasing portion sizes  Exercise recommendations include moderate physical activity 150 minutes/week  Depression Screening and Follow-up Plan: Clincally patient does not have depression  No treatment is required  The following portions of the patient's history were reviewed and updated as appropriate: allergies, current medications, past family history, past medical history, past social history, past surgical history, and problem list     Review of Systems   Constitutional: Negative  HENT: Negative  Eyes: Negative  Respiratory: Negative  Cardiovascular: Negative  Gastrointestinal: Negative  Endocrine: Negative  Genitourinary: Negative  Musculoskeletal: Negative  As noted in HPI   Skin: Negative  Allergic/Immunologic: Negative  Neurological: Negative  Hematological: Negative  Psychiatric/Behavioral: Negative            Objective:      /80 (BP Location: Right arm, Patient Position: Sitting, Cuff Size: Standard)   Pulse 97   Temp 98 2 °F (36 8 °C) (Temporal)   Ht 4' 11" (1 499 m)   Wt 60 7 kg (133 lb 13 1 oz)   SpO2 96%   BMI 27 03 kg/m²          Physical Exam  Vitals signs reviewed  Constitutional:       Appearance: She is well-developed  HENT:      Head: Normocephalic and atraumatic  Right Ear: External ear normal  Tympanic membrane is not erythematous or bulging  Left Ear: External ear normal  Tympanic membrane is not erythematous or bulging  Nose: Nose normal       Mouth/Throat:      Mouth: No oral lesions  Pharynx: No oropharyngeal exudate  Eyes:      General: No scleral icterus  Right eye: No discharge  Left eye: No discharge  Conjunctiva/sclera: Conjunctivae normal    Neck:      Musculoskeletal: Normal range of motion and neck supple  Thyroid: No thyromegaly  Cardiovascular:      Rate and Rhythm: Normal rate and regular rhythm  Heart sounds: Normal heart sounds  No murmur  No friction rub  No gallop  Pulmonary:      Effort: Pulmonary effort is normal  No respiratory distress  Breath sounds: No wheezing or rales  Chest:      Chest wall: No tenderness  Abdominal:      General: Bowel sounds are normal  There is no distension  Palpations: Abdomen is soft  There is no mass  Tenderness: There is no abdominal tenderness  There is no guarding or rebound  Musculoskeletal: Normal range of motion  General: No tenderness or deformity  Comments: Partially amputated left thumb with scabbing to the distal aspect of it  No loss of sensory or range of motion  No erythema or discharge  Lymphadenopathy:      Cervical: No cervical adenopathy  Skin:     General: Skin is warm and dry  Coloration: Skin is not pale  Findings: No erythema or rash  Neurological:      Mental Status: She is alert and oriented to person, place, and time  Cranial Nerves: No cranial nerve deficit  Motor: No abnormal muscle tone        Coordination: Coordination normal       Deep Tendon Reflexes: Reflexes are normal and symmetric     Psychiatric:         Behavior: Behavior normal

## 2021-04-18 DIAGNOSIS — L25.5 CONTACT DERMATITIS DUE TO PLANTS, EXCEPT FOOD, UNSPECIFIED CONTACT DERMATITIS TYPE: ICD-10-CM

## 2021-04-19 RX ORDER — PREDNISONE 10 MG/1
TABLET ORAL
Qty: 33 TABLET | Refills: 0 | OUTPATIENT
Start: 2021-04-19

## 2021-05-31 DIAGNOSIS — K29.00 ACUTE SUPERFICIAL GASTRITIS WITHOUT HEMORRHAGE: ICD-10-CM

## 2021-05-31 RX ORDER — FAMOTIDINE 20 MG/1
TABLET, FILM COATED ORAL
Qty: 60 TABLET | Refills: 0 | Status: SHIPPED | OUTPATIENT
Start: 2021-05-31 | End: 2021-06-24 | Stop reason: SDUPTHER

## 2021-06-24 ENCOUNTER — OFFICE VISIT (OUTPATIENT)
Dept: GASTROENTEROLOGY | Facility: CLINIC | Age: 56
End: 2021-06-24
Payer: COMMERCIAL

## 2021-06-24 VITALS
SYSTOLIC BLOOD PRESSURE: 112 MMHG | WEIGHT: 133 LBS | HEART RATE: 92 BPM | DIASTOLIC BLOOD PRESSURE: 81 MMHG | BODY MASS INDEX: 26.81 KG/M2 | HEIGHT: 59 IN

## 2021-06-24 DIAGNOSIS — K31.7 GASTRIC POLYP: ICD-10-CM

## 2021-06-24 DIAGNOSIS — K21.9 GASTROESOPHAGEAL REFLUX DISEASE, UNSPECIFIED WHETHER ESOPHAGITIS PRESENT: Primary | ICD-10-CM

## 2021-06-24 DIAGNOSIS — K58.2 IRRITABLE BOWEL SYNDROME WITH BOTH CONSTIPATION AND DIARRHEA: ICD-10-CM

## 2021-06-24 DIAGNOSIS — Z83.79 FAMILY HISTORY OF INFLAMMATORY BOWEL DISEASE: ICD-10-CM

## 2021-06-24 DIAGNOSIS — Z80.0 FAMILY HISTORY OF COLON CANCER: ICD-10-CM

## 2021-06-24 DIAGNOSIS — K29.00 ACUTE SUPERFICIAL GASTRITIS WITHOUT HEMORRHAGE: ICD-10-CM

## 2021-06-24 DIAGNOSIS — R14.0 BLOATING: ICD-10-CM

## 2021-06-24 PROCEDURE — 99214 OFFICE O/P EST MOD 30 MIN: CPT | Performed by: INTERNAL MEDICINE

## 2021-06-24 PROCEDURE — 1036F TOBACCO NON-USER: CPT | Performed by: INTERNAL MEDICINE

## 2021-06-24 PROCEDURE — 3008F BODY MASS INDEX DOCD: CPT | Performed by: INTERNAL MEDICINE

## 2021-06-24 RX ORDER — FAMOTIDINE 20 MG/1
20 TABLET, FILM COATED ORAL 2 TIMES DAILY
Qty: 180 TABLET | Refills: 3 | Status: SHIPPED | OUTPATIENT
Start: 2021-06-24 | End: 2022-04-27 | Stop reason: SDUPTHER

## 2021-06-24 NOTE — PROGRESS NOTES
[facility] - Outpatient Follow-up Note  Checo Valdovinos 64 y o  female MRN: [id number]  Encounter: [id number]          ASSESSMENT AND PLAN:      1  Acute superficial gastritis without hemorrhage   much improved on famotidine  She likewise had esophagitis and duodenitis  No H pylori I  - famotidine (PEPCID) 20 mg tablet; Take 1 tablet (20 mg total) by mouth 2 (two) times a day  Dispense: 180 tablet; Refill: 3    2  Gastroesophageal reflux disease, unspecified whether esophagitis present   no Carmona's esophagus much improved on  Famotidine  Was on omeprazole in the past   Will work on reflux precautions  3  Gastric polyp    Will repeat EGD in 3 years  4  Bloating    Nonspecific bloating  She is avoiding milk products  She does eat some vegetables  We discussed a gaseous nature of them at times  Additionally should she get constipated she could get bloating  No evidence of celiac disease on endoscopy  Gallbladder has been removed  She does have IBS mixed  Will give her a trial of IV guard for now she will continue with her probiotic  5  Family history of colon cancer    Father in his 76s colon cancer screening up-to-date    6  Family history of inflammatory bowel disease   nonspecific erythema of the colon no darío IBD on biopsies  There is a family history of Crohn's disease in a cousin and a sister with ulcerative colitis  Will repeat colonoscopy 1 year  7  Irritable bowel syndrome with both constipation and diarrhea    Discussed increasing fiber intake  She is more constipated than diarrhea  She will continue with her probiotics CT for  She does take flaxseed  On occasion requires   Dicyclomine  She is avoiding milk products  She will otherwise follow-up in 4 months     ______________________________________________________________________    SUBJECTIVE:    Very pleasant 75-year-old lady status post EGD and colonoscopy  We discussed the findings in detail  She had esophagitis gastritis duodenitis as well as gastric polyps  There is a family history of colon cancer the  No polyps were found  There were some diverticulosis internal hemorrhoids and some nonspecific erythema which was biopsied  She does have a family history of IBD  No weight loss melena bright red blood per rectum  Pepcid helps with her reflux  She does take Colace probiotics occasional dicyclomine  And flaxseed  REVIEW OF SYSTEMS IS OTHERWISE NEGATIVE        Historical Information   Past Medical History:   Diagnosis Date    Fibromyalgia, primary     GERD (gastroesophageal reflux disease)     Hiatal hernia     Irritable bowel syndrome     Sleep apnea      Past Surgical History:   Procedure Laterality Date    COLONOSCOPY      GALLBLADDER SURGERY      HYSTERECTOMY  2010    NASAL SEPTUM SURGERY      UPPER GASTROINTESTINAL ENDOSCOPY       Social History   Social History     Substance and Sexual Activity   Alcohol Use Not Currently     Social History     Substance and Sexual Activity   Drug Use Never     Social History     Tobacco Use   Smoking Status Never Smoker   Smokeless Tobacco Never Used     Family History   Problem Relation Age of Onset    Heart attack Mother     Stroke Mother     Colon cancer Father     Diabetes Father     Cancer Father     No Known Problems Sister     No Known Problems Daughter     No Known Problems Maternal Grandmother     No Known Problems Maternal Grandfather     Breast cancer Paternal Grandmother     No Known Problems Paternal Grandfather        Meds/Allergies       Current Outpatient Medications:     cetirizine (ZyrTEC) 10 mg tablet    cyclobenzaprine (FLEXERIL) 10 mg tablet    dicyclomine (BENTYL) 10 mg capsule    famotidine (PEPCID) 20 mg tablet    Flaxseed, Linseed, (FLAXSEED OIL PO)    levothyroxine 75 mcg tablet    MILK THISTLE PO    Probiotic Product (PROBIOTIC PO)    venlafaxine (EFFEXOR-XR) 37 5 mg 24 hr capsule    zolpidem (AMBIEN) 10 mg tablet    Allergies   Allergen Reactions    Aspirin Other (See Comments)     Reaction Date: 08Jun2011;       Erythromycin Base Other (See Comments)    Iodine - Food Allergy Other (See Comments)     ivp dye    Morphine Headache and Other (See Comments)     migraine headaches      Prochlorperazine Other (See Comments)    Tetracycline Other (See Comments)     Reaction Date: 08Jun2011;       Metoclopramide Anxiety, Other (See Comments) and Rash     Reaction Date: 08Jun2011;              Objective     Blood pressure 112/81, pulse 92, height 4' 11" (1 499 m), weight 60 3 kg (133 lb), not currently breastfeeding  Body mass index is 26 86 kg/m²  PHYSICAL EXAM:      General Appearance:   Alert, cooperative, no distress   HEENT:   Normocephalic, atraumatic, anicteric      Neck:  Supple, symmetrical, trachea midline   Lungs:   Clear to auscultation bilaterally; no rales, rhonchi or wheezing; respirations unlabored    Heart[de-identified]   Regular rate and rhythm; no murmur, rub, or gallop  Abdomen:   Soft, non-tender, non-distended; normal bowel sounds; no masses, no organomegaly    Genitalia:   Deferred    Rectal:   Deferred    Extremities:  No cyanosis, clubbing or edema    Pulses:  2+ and symmetric    Skin:  No jaundice, rashes, or lesions    Lymph nodes:  No palpable cervical lymphadenopathy        Lab Results:   No visits with results within 1 Day(s) from this visit     Latest known visit with results is:   Hospital Outpatient Visit on 03/02/2021   Component Date Value    Case Report 03/02/2021                      Value:Surgical Pathology Report                         Case: [id number]                                   Authorizing Provider:  Sherwin Melara MD           Collected:           03/02/2021 1131              Ordering Location:     [address] [place] [address] Received:            03/02/2021 [place] Pathologist:           Madisyn Mcgovern MD                                                         Specimens:   A) - Stomach, cold bx antrum erythema ck for hpy                                                    B) - Polyp, Stomach/Small Intestine, cold snare gastric body polyp                                  C) - Esophagus, cold bx eg junction erythema                                                        D) - Large Intestine, Left/Descending Colon, cold bx descending erythema                            E) - Large Intestine, Sigmoid Colon, cold bx sigmoid erythema                                                                 F) - Large Intestine, Sigmoid Colon, cold snare distal sigmoid polyp                       Final Diagnosis 03/02/2021                      Value: This result contains rich text formatting which cannot be displayed here   Additional Information 03/02/2021                      Value: This result contains rich text formatting which cannot be displayed here  Jesús Hoffman Gross Description 03/02/2021                      Value: This result contains rich text formatting which cannot be displayed here  Radiology Results:   No results found

## 2021-07-21 ENCOUNTER — HOSPITAL ENCOUNTER (OUTPATIENT)
Dept: MAMMOGRAPHY | Facility: MEDICAL CENTER | Age: 56
Discharge: HOME | End: 2021-07-21
Payer: COMMERCIAL

## 2021-07-21 VITALS — BODY MASS INDEX: 26.81 KG/M2 | HEIGHT: 59 IN | WEIGHT: 133 LBS

## 2021-07-21 DIAGNOSIS — Z12.31 VISIT FOR SCREENING MAMMOGRAM: ICD-10-CM

## 2021-07-21 PROCEDURE — 77063 BREAST TOMOSYNTHESIS BI: CPT

## 2021-07-21 PROCEDURE — 77067 SCR MAMMO BI INCL CAD: CPT

## 2021-09-24 ENCOUNTER — OFFICE VISIT (OUTPATIENT)
Dept: SLEEP CENTER | Facility: CLINIC | Age: 56
End: 2021-09-24
Payer: COMMERCIAL

## 2021-09-24 VITALS
WEIGHT: 131.8 LBS | HEIGHT: 59 IN | BODY MASS INDEX: 26.57 KG/M2 | SYSTOLIC BLOOD PRESSURE: 110 MMHG | DIASTOLIC BLOOD PRESSURE: 70 MMHG

## 2021-09-24 DIAGNOSIS — M79.7 FIBROMYALGIA SYNDROME: ICD-10-CM

## 2021-09-24 DIAGNOSIS — G47.09 OTHER INSOMNIA: ICD-10-CM

## 2021-09-24 DIAGNOSIS — F32.A ANXIETY AND DEPRESSION: ICD-10-CM

## 2021-09-24 DIAGNOSIS — F41.9 ANXIETY AND DEPRESSION: ICD-10-CM

## 2021-09-24 DIAGNOSIS — R09.81 NASAL CONGESTION: ICD-10-CM

## 2021-09-24 DIAGNOSIS — E03.9 ACQUIRED HYPOTHYROIDISM: ICD-10-CM

## 2021-09-24 DIAGNOSIS — G47.33 OSA (OBSTRUCTIVE SLEEP APNEA): Primary | ICD-10-CM

## 2021-09-24 DIAGNOSIS — G47.26 SHIFT WORK SLEEP DISORDER: ICD-10-CM

## 2021-09-24 DIAGNOSIS — E66.3 OVERWEIGHT (BMI 25.0-29.9): ICD-10-CM

## 2021-09-24 DIAGNOSIS — K21.9 GASTROESOPHAGEAL REFLUX DISEASE WITHOUT ESOPHAGITIS: ICD-10-CM

## 2021-09-24 PROCEDURE — 1036F TOBACCO NON-USER: CPT | Performed by: INTERNAL MEDICINE

## 2021-09-24 PROCEDURE — 3008F BODY MASS INDEX DOCD: CPT | Performed by: INTERNAL MEDICINE

## 2021-09-24 PROCEDURE — 99214 OFFICE O/P EST MOD 30 MIN: CPT | Performed by: INTERNAL MEDICINE

## 2021-09-24 RX ORDER — LEVOTHYROXINE SODIUM 0.07 MG/1
TABLET ORAL
Qty: 90 TABLET | Refills: 0 | Status: SHIPPED | OUTPATIENT
Start: 2021-09-24 | End: 2021-12-25

## 2021-09-24 NOTE — PATIENT INSTRUCTIONS

## 2021-09-24 NOTE — PROGRESS NOTES
Follow-Up Note - Sleep Center   Sonya Brock  64 y o  female  :1965  IMF:[id number]  DOS:2021    CC: I saw this patient for follow-up in clinic today for Sleep disordered breathing, Coexisting Sleep and Medical Problems  She is using a ResMed machine that purchased 2nd hand and is not sure of the age  The study demonstrated   obstructive sleep apnea: AHI 24 /hour considerably higher during REM at 50 per hour  Minimum oxygen saturation 78 %  and 13 6 minutes of total sleep time was spent with saturations less than 90%  PFSH, Problem List, Medications & Allergies were reviewed in EMR  Interval changes: none reported  She  has a past medical history of Fibromyalgia, primary, GERD (gastroesophageal reflux disease), Hiatal hernia, Irritable bowel syndrome, and Sleep apnea  She has a current medication list which includes the following prescription(s): cetirizine, dicyclomine, escitalopram oxalate, famotidine, flaxseed (linseed), levothyroxine, milk thistle, probiotic product, venlafaxine, and cyclobenzaprine  PHYSIOLOGICAL DATA REVIEW AND INTERPRETATION:   Compliance data shows use for more than 4 hours only 6% of the time  Respiratory event index is 2 2 per hour at 95th percentile pressure of 8 5 cm H2O   she reports irregular use of the device for > 4 hours/night  She averages use 3 to 4 times a week but no more than an hour two unless I able to fall asleep with it" ; Compliance: poor; Sleep disordered breathing:stable & within target range; Patient has not been using ozone based or other devices to sanitize the machine  SUBJECTIVE: Regarding use of PAP, Mihaela Anderson reports:   · She is experiencing some adverse effects: mask leaks ; has not noticed any fibres or foreign material in air line  · She is benefiting from use: sleeping better and Unsure   Sleep Routine:  Mihaela Anderson works 3rd shift and reports getting 2-3 hrs sleep when she gets home from work and tries to get an additional few hours in the afternoon before her work shift; she has difficulty initiating and maintaining sleep   She has tried various prescription medications with poor results  Presently she is using melatonin 3 mg prior to her bedtime  She arises spontaneously and is not always refreshed  Jenniffer Staples denies Excessive Daytime Sleepiness, and tries to nap when she can but do not fall asleep"  She rated herself at Total score: 3 /24 on the [place] Sleepiness Scale  Habits: reports that she has never smoked  She has never used smokeless tobacco ,  reports previous alcohol use ,  reports no history of drug use , Caffeine use: very little , Exercise routine: sometimes   ROS: reviewed & as attached  Significant for weight reduction of around 10 lb since her last visit because of dietary change  She had severe acid reflux that is now controlled  She has nasal congestion for which she uses Zyrtec and p r n  Topical nasal steroid  She is on Lexapro for anxiety but continues to report racing thoughts  She has musculoskeletal aches and pains due to fibromyalgia  EXAM: /70   Ht 4' 11" (1 499 m)   Wt 59 8 kg (131 lb 12 8 oz)   BMI 26 62 kg/m²     Patient is well groomed; well appearing  H&N: EOMI; NC/AT:no facial pressure marks, no rashes  Skin/Extrem: col & hydration normal; no edema  Psych: cooperativeand in no distress  Mental state:appears anxious and has a constricted affect  Resp: Respiratory effort is normal  CNS: Alert, orientated, clear & coherent speech  Physical findings otherwise essentially unchanged from previous  IMPRESSION: Problem List Items & Comorbidities Addressed this Visit    1  AYM (obstructive sleep apnea)  Cpap DME   2  Other insomnia     3  Shift work sleep disorder     4  Anxiety and depression     5  Fibromyalgia syndrome     6  Nasal congestion     7  Gastroesophageal reflux disease without esophagitis     8  Overweight (BMI 25 0-29  9)         PLAN:  1   I reviewed results of prior studies and physiologic data with the patient  2  I discussed treatment options with risks and benefits  3  Treatment with  PAP is medically necessary and Jo Ann Hyatt is agreable to continue use  4  Care of equipment, methods to improve comfort using PAP and importance of compliance with therapy were discussed  5  Pressure setting: continue auto titrating pap  At her request, I provided a prescription for a replacement CPAP machine  6  Rx provided to replace  supplies and Care coordinated with DME provider  7  She is unable to change to a regular shift that was strongly recommended  In new of this, I suggested melatonin 1 mg 3 hours prior to her planned bedtime  8  She may also benefit from adding gabapentin for fibromyalgia that would also improved anxiety and be helpful as a sleep aid  9  Alternately, adding trazodone may be considered  10  She will also need to ensure adequate treatment of her allergies and acid reflux  11  Discussed strategies for weight maintenance  12  Once sleep has consolidated, a repeat diagnostic study to re-evaluate her sleep disordered breathing can be considered  If insurance declines, home sleep testing may be helpful  13  Follow-up is advised in 1 year or sooner if needed to monitor progress, compliance and to adjust therapy  Thank you for allowing me to participate in the care of this patient  Sincerely,    Authenticated electronically by Scott Brennan MD on 46/96/76   Board Certified Specialist     Portions of the record may have been created with voice recognition software  Occasional wrong word or "sound a like" substitutions may have occurred due to the inherent limitations of voice recognition software  There may also be notations and random deletions of words or characters from malfunctioning software  Read the chart carefully and recognize, using context, where substitutions/deletions have occurred

## 2021-09-24 NOTE — PROGRESS NOTES
Review of Systems      Genitourinary need to urinate more than twice a night and post menopausal (no peroids)   Cardiology none   Gastrointestinal frequent heartburn/acid reflux   Neurology forgetfulness and poor concentration or confusion,    Constitutional claustrophobia and fatigue   Integumentary none   Psychiatry anxiety and depression   Musculoskeletal joint pain   Pulmonary none   ENT throat clearing   Endocrine none   Hematological none

## 2021-09-27 ENCOUNTER — TELEPHONE (OUTPATIENT)
Dept: SLEEP CENTER | Facility: CLINIC | Age: 56
End: 2021-09-27

## 2021-11-04 ENCOUNTER — OFFICE VISIT (OUTPATIENT)
Dept: GASTROENTEROLOGY | Facility: CLINIC | Age: 56
End: 2021-11-04
Payer: COMMERCIAL

## 2021-11-04 VITALS
DIASTOLIC BLOOD PRESSURE: 72 MMHG | BODY MASS INDEX: 26.21 KG/M2 | WEIGHT: 130 LBS | SYSTOLIC BLOOD PRESSURE: 107 MMHG | HEART RATE: 75 BPM | HEIGHT: 59 IN

## 2021-11-04 DIAGNOSIS — K58.2 IRRITABLE BOWEL SYNDROME WITH BOTH CONSTIPATION AND DIARRHEA: ICD-10-CM

## 2021-11-04 DIAGNOSIS — K29.00 ACUTE SUPERFICIAL GASTRITIS WITHOUT HEMORRHAGE: ICD-10-CM

## 2021-11-04 DIAGNOSIS — Z80.0 FAMILY HISTORY OF COLON CANCER: ICD-10-CM

## 2021-11-04 DIAGNOSIS — Z83.79 FAMILY HISTORY OF INFLAMMATORY BOWEL DISEASE: ICD-10-CM

## 2021-11-04 DIAGNOSIS — K21.9 GASTROESOPHAGEAL REFLUX DISEASE, UNSPECIFIED WHETHER ESOPHAGITIS PRESENT: Primary | ICD-10-CM

## 2021-11-04 DIAGNOSIS — R14.0 BLOATING: ICD-10-CM

## 2021-11-04 DIAGNOSIS — K31.7 GASTRIC POLYP: ICD-10-CM

## 2021-11-04 PROCEDURE — 1036F TOBACCO NON-USER: CPT | Performed by: INTERNAL MEDICINE

## 2021-11-04 PROCEDURE — 3008F BODY MASS INDEX DOCD: CPT | Performed by: INTERNAL MEDICINE

## 2021-11-04 PROCEDURE — 99214 OFFICE O/P EST MOD 30 MIN: CPT | Performed by: INTERNAL MEDICINE

## 2021-11-04 RX ORDER — DOCUSATE SODIUM 100 MG/1
CAPSULE, LIQUID FILLED ORAL
Qty: 60 CAPSULE | Refills: 1 | Status: SHIPPED | OUTPATIENT
Start: 2021-11-04

## 2022-01-28 NOTE — TELEPHONE ENCOUNTER
Patient called [facility] regarding her Rx Levothyroxine refill  Per Dr Joey Santamaria, patient was notified that she would have to obtain TSH prior the med refill  Patient agreeable, verbalized understanding

## 2022-03-04 DIAGNOSIS — E03.9 ACQUIRED HYPOTHYROIDISM: ICD-10-CM

## 2022-03-04 DIAGNOSIS — F41.1 GENERALIZED ANXIETY DISORDER: ICD-10-CM

## 2022-03-04 RX ORDER — VENLAFAXINE HYDROCHLORIDE 37.5 MG/1
37.5 CAPSULE, EXTENDED RELEASE ORAL
Qty: 90 CAPSULE | Refills: 3 | Status: SHIPPED | OUTPATIENT
Start: 2022-03-04 | End: 2022-06-13 | Stop reason: SDUPTHER

## 2022-03-04 RX ORDER — LEVOTHYROXINE SODIUM 0.07 MG/1
75 TABLET ORAL DAILY
Qty: 30 TABLET | Refills: 0 | Status: SHIPPED | OUTPATIENT
Start: 2022-03-04 | End: 2022-04-27 | Stop reason: SDUPTHER

## 2022-03-04 NOTE — TELEPHONE ENCOUNTER
Failed protocol    Psychiatry: Antidepressants - SNRI - desvenlafaxine & venlafaxine Failed    Valid encounter within last 6 months    Last BP in normal range and within 180 days       Endocrinology: Hypothyroid Agents Failed     TSH within 360 days    Valid encounter in last 12 months    - please advise as the levothyroxine should not have failed as pt had TSH done recently

## 2022-04-21 ENCOUNTER — TELEMEDICINE (OUTPATIENT)
Dept: FAMILY MEDICINE CLINIC | Facility: CLINIC | Age: 57
End: 2022-04-21
Payer: COMMERCIAL

## 2022-04-21 DIAGNOSIS — J01.00 ACUTE NON-RECURRENT MAXILLARY SINUSITIS: Primary | ICD-10-CM

## 2022-04-21 PROCEDURE — 99213 OFFICE O/P EST LOW 20 MIN: CPT | Performed by: FAMILY MEDICINE

## 2022-04-21 RX ORDER — LEVOFLOXACIN 500 MG/1
500 TABLET, FILM COATED ORAL EVERY 24 HOURS
Qty: 5 TABLET | Refills: 0 | Status: SHIPPED | OUTPATIENT
Start: 2022-04-21 | End: 2022-04-26

## 2022-04-21 RX ORDER — ONDANSETRON HYDROCHLORIDE 8 MG/1
8 TABLET, FILM COATED ORAL EVERY 8 HOURS PRN
Qty: 20 TABLET | Refills: 0 | Status: SHIPPED | OUTPATIENT
Start: 2022-04-21 | End: 2022-06-13 | Stop reason: SDUPTHER

## 2022-04-21 NOTE — PROGRESS NOTES
Virtual Regular Visit    Verification of patient location:    Patient is located in the following state in which I hold an active license [place]      Assessment/Plan:  Side effect profile medication reviewed  She will repeat COVID testing at home and call us if positive  She will call us with any new persisting or worsening symptoms  Problem List Items Addressed This Visit     None      Visit Diagnoses     Acute non-recurrent maxillary sinusitis    -  Primary    Relevant Medications    levofloxacin (LEVAQUIN) 500 mg tablet    ondansetron (ZOFRAN) 8 mg tablet               Reason for visit is No chief complaint on file  Encounter provider Cruzito Buck DO    Provider located at [address] [place] [address] [phone] [phone]      Recent Visits  No visits were found meeting these conditions  Showing recent visits within past 7 days and meeting all other requirements  Future Appointments  No visits were found meeting these conditions  Showing future appointments within next 150 days and meeting all other requirements       The patient was identified by name and date of birth  Gael Ramsay was informed that this is a telemedicine visit and that the visit is being conducted through [address] [place] [address] and patient was informed that this is a secure, HIPAA-compliant platform  She agrees to proceed     My office door was closed  No one else was in the room  She acknowledged consent and understanding of privacy and security of the video platform  The patient has agreed to participate and understands they can discontinue the visit at any time  Patient is aware this is a billable service  Subjective  Gael Ramsay is a 62 y o  female for congestion   With sinus pressure and congestion for the last few days  She has occasional nausea  She did a COVID test 2 days ago that was negative  Denies any fevers    She has had COVID in the past        Past Medical History: Diagnosis Date    Fibromyalgia, primary     GERD (gastroesophageal reflux disease)     Hiatal hernia     Irritable bowel syndrome     Sleep apnea        Past Surgical History:   Procedure Laterality Date    COLONOSCOPY      GALLBLADDER SURGERY      HYSTERECTOMY  2010    NASAL SEPTUM SURGERY      OOPHORECTOMY  2010    UPPER GASTROINTESTINAL ENDOSCOPY         Current Outpatient Medications   Medication Sig Dispense Refill    cetirizine (ZyrTEC) 10 mg tablet Take 10 mg by mouth daily      cyclobenzaprine (FLEXERIL) 10 mg tablet Take 1 tablet (10 mg total) by mouth 3 (three) times a day as needed for muscle spasms 30 tablet 2    dicyclomine (BENTYL) 10 mg capsule Take 1 capsule by mouth 3 (three) times a day       docusate sodium (COLACE) 100 mg capsule One capsule by mouth every other 60 capsule 1    Escitalopram Oxalate (LEXAPRO PO) Take by mouth      famotidine (PEPCID) 20 mg tablet Take 1 tablet (20 mg total) by mouth 2 (two) times a day 180 tablet 3    Flaxseed, Linseed, (FLAXSEED OIL PO) Take by mouth daily      levofloxacin (LEVAQUIN) 500 mg tablet Take 1 tablet (500 mg total) by mouth every 24 hours for 5 days 5 tablet 0    levothyroxine 75 mcg tablet Take 1 tablet (75 mcg total) by mouth daily 30 tablet 0    MILK THISTLE PO Take by mouth      ondansetron (ZOFRAN) 8 mg tablet Take 1 tablet (8 mg total) by mouth every 8 (eight) hours as needed for nausea or vomiting 20 tablet 0    Probiotic Product (PROBIOTIC PO) Take by mouth      venlafaxine (EFFEXOR-XR) 37 5 mg 24 hr capsule Take 1 capsule (37 5 mg total) by mouth daily with breakfast 90 capsule 3     No current facility-administered medications for this visit          Allergies   Allergen Reactions    Aspirin Other (See Comments)     Reaction Date: 08Jun2011;       Erythromycin Base Other (See Comments)    Iodine - Food Allergy Other (See Comments)     ivp dye    Morphine Headache and Other (See Comments)     migraine headaches  Prochlorperazine Other (See Comments)    Tetracycline Other (See Comments)     Reaction Date: 08Jun2011;       Metoclopramide Anxiety, Other (See Comments) and Rash     Reaction Date: 08Jun2011;          Review of Systems   Constitutional: Negative  Negative for fever  HENT: Positive for congestion and postnasal drip  Eyes: Negative  Respiratory: Negative  Negative for cough  Cardiovascular: Negative  Gastrointestinal: Positive for nausea  Endocrine: Negative  Genitourinary: Negative  Musculoskeletal: Negative  Skin: Negative  Allergic/Immunologic: Negative  Neurological: Negative  Hematological: Negative  Psychiatric/Behavioral: Negative  Video Exam    There were no vitals filed for this visit  Physical Exam  Constitutional:       General: She is not in acute distress  Appearance: She is well-developed  She is not diaphoretic  Neurological:      Mental Status: She is alert and oriented to person, place, and time  Psychiatric:         Behavior: Behavior normal          Thought Content: Thought content normal          Judgment: Judgment normal           I spent 15 minutes directly with the patient during this visit    [address] verbally agrees to participate in [employer]  Pt is aware that [employer] could be limited without vital signs or the ability to perform a full hands-on physical exam  Divina Ewing understands she or the provider may request at any time to terminate the video visit and request the patient to seek care or treatment in person

## 2022-04-27 ENCOUNTER — OFFICE VISIT (OUTPATIENT)
Dept: FAMILY MEDICINE CLINIC | Facility: CLINIC | Age: 57
End: 2022-04-27
Payer: COMMERCIAL

## 2022-04-27 VITALS
TEMPERATURE: 97.8 F | HEIGHT: 59 IN | WEIGHT: 134.6 LBS | BODY MASS INDEX: 27.13 KG/M2 | OXYGEN SATURATION: 98 % | HEART RATE: 71 BPM

## 2022-04-27 DIAGNOSIS — K29.00 ACUTE SUPERFICIAL GASTRITIS WITHOUT HEMORRHAGE: ICD-10-CM

## 2022-04-27 DIAGNOSIS — E03.9 ACQUIRED HYPOTHYROIDISM: ICD-10-CM

## 2022-04-27 DIAGNOSIS — M54.50 ACUTE MIDLINE LOW BACK PAIN WITHOUT SCIATICA: ICD-10-CM

## 2022-04-27 DIAGNOSIS — Z12.31 ENCOUNTER FOR SCREENING MAMMOGRAM FOR MALIGNANT NEOPLASM OF BREAST: ICD-10-CM

## 2022-04-27 DIAGNOSIS — Z11.4 SCREENING FOR HIV (HUMAN IMMUNODEFICIENCY VIRUS): ICD-10-CM

## 2022-04-27 DIAGNOSIS — M75.80 ROTATOR CUFF TENDINITIS, UNSPECIFIED LATERALITY: Primary | ICD-10-CM

## 2022-04-27 PROCEDURE — 1036F TOBACCO NON-USER: CPT | Performed by: FAMILY MEDICINE

## 2022-04-27 PROCEDURE — 99214 OFFICE O/P EST MOD 30 MIN: CPT | Performed by: FAMILY MEDICINE

## 2022-04-27 PROCEDURE — 3725F SCREEN DEPRESSION PERFORMED: CPT | Performed by: FAMILY MEDICINE

## 2022-04-27 RX ORDER — FAMOTIDINE 20 MG/1
20 TABLET, FILM COATED ORAL 2 TIMES DAILY
Qty: 180 TABLET | Refills: 3 | Status: SHIPPED | OUTPATIENT
Start: 2022-04-27 | End: 2022-06-13 | Stop reason: SDUPTHER

## 2022-04-27 RX ORDER — PREDNISONE 10 MG/1
TABLET ORAL
Qty: 33 TABLET | Refills: 0 | Status: SHIPPED | OUTPATIENT
Start: 2022-04-27

## 2022-04-27 RX ORDER — CYCLOBENZAPRINE HCL 10 MG
10 TABLET ORAL 3 TIMES DAILY PRN
Qty: 30 TABLET | Refills: 2 | Status: SHIPPED | OUTPATIENT
Start: 2022-04-27 | End: 2022-06-13 | Stop reason: SDUPTHER

## 2022-04-27 RX ORDER — LEVOTHYROXINE SODIUM 0.07 MG/1
75 TABLET ORAL DAILY
Qty: 30 TABLET | Refills: 0 | Status: SHIPPED | OUTPATIENT
Start: 2022-04-27 | End: 2022-05-27

## 2022-04-27 NOTE — PROGRESS NOTES
BMI Counseling: Body mass index is 27 19 kg/m²  The BMI is above normal  Nutrition recommendations include reducing portion sizes

## 2022-04-27 NOTE — PROGRESS NOTES
Assessment/Plan:  Side-effect profile medication reviewed  Also recommend physical therapy  Time spent counseling reviewing treatment options and coordinating care as well as documentation was 30 minutes  She will use muscle relaxants as well if needed  Recommend orthopedic evaluation if no improvement or worsening symptoms  We discussed the possibility that Levaquin may be exacerbating some tendinitis  1  Rotator cuff tendinitis, unspecified laterality  -     predniSONE 10 mg tablet; 6 tablets daily, all at one time, with food  Then on day #4 decrease by 1 pill each day until finished  -     Ambulatory Referral to Physical Therapy; Future    2  Screening for HIV (human immunodeficiency virus)  -     [facility]- Human Immunodeficiency Virus 1/2 Antigen / Antibody ( Fourth Generation) with Reflex Testing; Future    3  Encounter for screening mammogram for malignant neoplasm of breast  -     Mammo screening bilateral w 3d & cad; Future; Expected date: 04/27/2022    4  Acute superficial gastritis without hemorrhage  -     famotidine (PEPCID) 20 mg tablet; Take 1 tablet (20 mg total) by mouth 2 (two) times a day    5  Acute midline low back pain without sciatica  -     cyclobenzaprine (FLEXERIL) 10 mg tablet; Take 1 tablet (10 mg total) by mouth 3 (three) times a day as needed for muscle spasms    6  Acquired hypothyroidism  -     levothyroxine 75 mcg tablet; Take 1 tablet (75 mcg total) by mouth daily          Subjective:      Patient ID: Bree Hickey is a 62 y o  female  Patient seen today for discomfort to the deltoid areas of bilateral arms  Symptoms are mild with onset yesterday  She states that she has some limited range of motion to internal and external rotation of both shoulders, right greater than left  Denies any trauma or weakness  No neck pain or headaches  No leg issues             The following portions of the patient's history were reviewed and updated as appropriate: allergies, current medications, past family history, past medical history, past social history, past surgical history, and problem list     Review of Systems   Constitutional: Negative  HENT: Negative  Eyes: Negative  Respiratory: Negative  Cardiovascular: Negative  Gastrointestinal: Negative  Endocrine: Negative  Genitourinary: Negative  Musculoskeletal: Positive for arthralgias  Skin: Negative  Allergic/Immunologic: Negative  Neurological: Negative  Hematological: Negative  Psychiatric/Behavioral: Negative  Objective:      Pulse 71   Temp 97 8 °F (36 6 °C) (Temporal)   Ht 4' 11" (1 499 m)   Wt 61 1 kg (134 lb 9 6 oz)   SpO2 98%   BMI 27 19 kg/m²          Physical Exam  Vitals reviewed  Constitutional:       Appearance: She is well-developed  HENT:      Head: Normocephalic and atraumatic  Right Ear: External ear normal  Tympanic membrane is not erythematous or bulging  Left Ear: External ear normal  Tympanic membrane is not erythematous or bulging  Nose: Nose normal       Mouth/Throat:      Mouth: No oral lesions  Pharynx: No oropharyngeal exudate  Eyes:      General: No scleral icterus  Right eye: No discharge  Left eye: No discharge  Conjunctiva/sclera: Conjunctivae normal    Neck:      Thyroid: No thyromegaly  Cardiovascular:      Rate and Rhythm: Normal rate and regular rhythm  Heart sounds: Normal heart sounds  No murmur heard  No friction rub  No gallop  Pulmonary:      Effort: Pulmonary effort is normal  No respiratory distress  Breath sounds: No wheezing or rales  Chest:      Chest wall: No tenderness  Abdominal:      General: Bowel sounds are normal  There is no distension  Palpations: Abdomen is soft  There is no mass  Tenderness: There is no abdominal tenderness  There is no guarding or rebound  Musculoskeletal:         General: No tenderness or deformity   Normal range of motion  Cervical back: Normal range of motion and neck supple  Comments: Range of motion to bilateral shoulders with limited abduction and adduction to 30° bilaterally  Lymphadenopathy:      Cervical: No cervical adenopathy  Skin:     General: Skin is warm and dry  Coloration: Skin is not pale  Findings: No erythema or rash  Neurological:      Mental Status: She is alert and oriented to person, place, and time  Cranial Nerves: No cranial nerve deficit  Motor: No abnormal muscle tone  Coordination: Coordination normal       Deep Tendon Reflexes: Reflexes are normal and symmetric     Psychiatric:         Behavior: Behavior normal

## 2022-04-27 NOTE — LETTER
April 27, 2022     Patient: Frank Alvarez  YOB: 1965  Date of Visit: 4/27/2022      To Whom it May Concern: Ivy Hunter is under my professional care  Nancy Rabago was seen in my office on 4/27/2022  Please allow her to return to work with light duty and no heavy lifting greater than 10 pounds for the next 1 week  If you have any questions or concerns, please don't hesitate to call           Sincerely,          Nancy Jiang DO        CC: No Recipients

## 2022-05-16 ENCOUNTER — TELEPHONE (OUTPATIENT)
Dept: GASTROENTEROLOGY | Facility: CLINIC | Age: 57
End: 2022-05-16

## 2022-05-16 NOTE — TELEPHONE ENCOUNTER
[facility] Assessment    Name: Darlyn Blank  YOB: 1965  Last Height: 4' 11" (1 499 m)  Last weight: 61 1 kg (134 lb 9 6 oz)  BMI: 27 19 kg/m²  Procedure: colon  Diagnosis: see orders  Date of procedure: 6/27/22  Prep: miralax and dul  Responsible : yes  Phone#: [phone]  Name completing form: [facility]  Date form completed: 05/16/22      If the patient answers yes to any of these questions, schedule in a hospital  Are you pregnant: No  Do you rely on a wheelchair for mobility: No  Have you been diagnosed with End Stage Renal Disease (ESRD): No  Do you need oxygen during the day: No  Have you had a heart attack or stroke within the past three months: No  Have you had a seizure within the past three months: No  Have you ever been informed by anesthesia that you have a difficult airway: No  Additional Questions  Have you had any cardiac testing or are under the care of a Cardiologist (see cardiac list): No  Cardiac list:   Do you have an implanted cardiac defibrillator: No (Comment:  This patient should be scheduled in the hospital)    Have any bleeding problems, such as anemia or hemophilia (If patient has H&H result below 8, schedule in hospital   H&H must be within 30 days of procedure): No    Had an organ transplant within the past 3 months: No    Do you have any present infections: No  Do you get short of breath when walking a few blocks: No  Have you been diagnosed with diabetes: No  Comments (provide cardiac provider information if applicable):

## 2022-05-16 NOTE — TELEPHONE ENCOUNTER
Pt is scheduled  She is having Dr Stanford Bear do colon as she needs a Monday and Dr Ricardo Goldman has no Mondays available

## 2022-05-16 NOTE — TELEPHONE ENCOUNTER
Patients GI provider:  Dr Candace Nobles    Number to return call: [phone]    Reason for call: Pt calling to schedule her colonoscopy  Pt asks that she is scheduled on a Monday  Pt also asjs that you call her before 2pm as she works night shift      Scheduled procedure/appointment date if applicable: N/A

## 2022-05-26 DIAGNOSIS — E03.9 ACQUIRED HYPOTHYROIDISM: ICD-10-CM

## 2022-05-27 RX ORDER — LEVOTHYROXINE SODIUM 0.07 MG/1
TABLET ORAL
Qty: 30 TABLET | Refills: 0 | Status: SHIPPED | OUTPATIENT
Start: 2022-05-27 | End: 2022-06-13

## 2022-06-01 ENCOUNTER — TELEPHONE (OUTPATIENT)
Dept: GASTROENTEROLOGY | Facility: AMBULARY SURGERY CENTER | Age: 57
End: 2022-06-01

## 2022-06-01 NOTE — TELEPHONE ENCOUNTER
Patients GI provider:  Dr Celeste Boyce     Number to return call: [phone]    Reason for call: Pt calling requesting for a call back to reschedule her procedure     Scheduled procedure/appointment date if applicable: Apt/procedure 6/27/22

## 2022-06-01 NOTE — TELEPHONE ENCOUNTER
I returned pt's call, lmom informing her that we received message and I will go ahead and cx her procedure on 6/27/22 and to please call back to r/s

## 2022-06-13 ENCOUNTER — OFFICE VISIT (OUTPATIENT)
Dept: FAMILY MEDICINE CLINIC | Facility: CLINIC | Age: 57
End: 2022-06-13
Payer: COMMERCIAL

## 2022-06-13 VITALS
WEIGHT: 132.4 LBS | HEIGHT: 59 IN | BODY MASS INDEX: 26.69 KG/M2 | DIASTOLIC BLOOD PRESSURE: 86 MMHG | HEART RATE: 110 BPM | TEMPERATURE: 98.4 F | OXYGEN SATURATION: 97 % | SYSTOLIC BLOOD PRESSURE: 132 MMHG | RESPIRATION RATE: 14 BRPM

## 2022-06-13 DIAGNOSIS — Z00.00 WELL ADULT EXAM: Primary | ICD-10-CM

## 2022-06-13 DIAGNOSIS — K29.00 ACUTE SUPERFICIAL GASTRITIS WITHOUT HEMORRHAGE: ICD-10-CM

## 2022-06-13 DIAGNOSIS — K58.2 IRRITABLE BOWEL SYNDROME WITH BOTH CONSTIPATION AND DIARRHEA: ICD-10-CM

## 2022-06-13 DIAGNOSIS — K21.9 GASTROESOPHAGEAL REFLUX DISEASE WITHOUT ESOPHAGITIS: ICD-10-CM

## 2022-06-13 DIAGNOSIS — J01.00 ACUTE NON-RECURRENT MAXILLARY SINUSITIS: ICD-10-CM

## 2022-06-13 DIAGNOSIS — M54.50 ACUTE MIDLINE LOW BACK PAIN WITHOUT SCIATICA: ICD-10-CM

## 2022-06-13 DIAGNOSIS — E03.9 ACQUIRED HYPOTHYROIDISM: ICD-10-CM

## 2022-06-13 DIAGNOSIS — F41.1 GENERALIZED ANXIETY DISORDER: ICD-10-CM

## 2022-06-13 PROCEDURE — 3008F BODY MASS INDEX DOCD: CPT | Performed by: FAMILY MEDICINE

## 2022-06-13 PROCEDURE — 99396 PREV VISIT EST AGE 40-64: CPT | Performed by: FAMILY MEDICINE

## 2022-06-13 PROCEDURE — 1036F TOBACCO NON-USER: CPT | Performed by: FAMILY MEDICINE

## 2022-06-13 RX ORDER — LORAZEPAM 0.5 MG/1
TABLET ORAL
Qty: 90 TABLET | Refills: 0 | Status: SHIPPED | OUTPATIENT
Start: 2022-06-13 | End: 2022-08-05 | Stop reason: SDUPTHER

## 2022-06-13 RX ORDER — ONDANSETRON HYDROCHLORIDE 8 MG/1
8 TABLET, FILM COATED ORAL EVERY 8 HOURS PRN
Qty: 30 TABLET | Refills: 0 | Status: SHIPPED | OUTPATIENT
Start: 2022-06-13

## 2022-06-13 RX ORDER — CYCLOBENZAPRINE HCL 10 MG
10 TABLET ORAL 3 TIMES DAILY PRN
Qty: 30 TABLET | Refills: 2 | Status: SHIPPED | OUTPATIENT
Start: 2022-06-13

## 2022-06-13 RX ORDER — LEVOTHYROXINE SODIUM 0.07 MG/1
TABLET ORAL
Qty: 30 TABLET | Refills: 0 | Status: SHIPPED | OUTPATIENT
Start: 2022-06-13 | End: 2022-06-13 | Stop reason: SDUPTHER

## 2022-06-13 RX ORDER — ONDANSETRON HYDROCHLORIDE 8 MG/1
8 TABLET, FILM COATED ORAL EVERY 8 HOURS PRN
Qty: 20 TABLET | Refills: 0 | Status: SHIPPED | OUTPATIENT
Start: 2022-06-13 | End: 2022-06-13 | Stop reason: SDUPTHER

## 2022-06-13 RX ORDER — FAMOTIDINE 20 MG/1
20 TABLET, FILM COATED ORAL 2 TIMES DAILY
Qty: 180 TABLET | Refills: 3 | Status: SHIPPED | OUTPATIENT
Start: 2022-06-13 | End: 2022-07-29 | Stop reason: SDUPTHER

## 2022-06-13 RX ORDER — VENLAFAXINE HYDROCHLORIDE 37.5 MG/1
37.5 CAPSULE, EXTENDED RELEASE ORAL
Qty: 90 CAPSULE | Refills: 1 | Status: SHIPPED | OUTPATIENT
Start: 2022-06-13

## 2022-06-13 RX ORDER — DICYCLOMINE HYDROCHLORIDE 10 MG/1
10 CAPSULE ORAL 3 TIMES DAILY
Qty: 90 CAPSULE | Refills: 0 | Status: SHIPPED | OUTPATIENT
Start: 2022-06-13 | End: 2022-07-08

## 2022-06-13 RX ORDER — LEVOTHYROXINE SODIUM 0.07 MG/1
75 TABLET ORAL DAILY
Qty: 90 TABLET | Refills: 1 | Status: SHIPPED | OUTPATIENT
Start: 2022-06-13
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
## 2022-07-08 DIAGNOSIS — K58.2 IRRITABLE BOWEL SYNDROME WITH BOTH CONSTIPATION AND DIARRHEA: ICD-10-CM

## 2022-07-08 RX ORDER — DICYCLOMINE HYDROCHLORIDE 10 MG/1
CAPSULE ORAL
Qty: 90 CAPSULE | Refills: 0 | Status: SHIPPED | OUTPATIENT
Start: 2022-07-08

## 2022-07-10 ENCOUNTER — TELEPHONE (OUTPATIENT)
Dept: OTHER | Facility: OTHER | Age: 57
End: 2022-07-10

## 2022-07-10 NOTE — TELEPHONE ENCOUNTER
Pt was calling to find out what time she was supposed to arrive for her procedure tomorrow at [facility], She is aware it is at 10:45am, advised that there is prep time  She has the number to call in the morning and if someone could call her to make sure she is aware of her arrival time, she would greatly appreciate that

## 2022-07-11 ENCOUNTER — HOSPITAL ENCOUNTER (OUTPATIENT)
Dept: GASTROENTEROLOGY | Facility: AMBULATORY SURGERY CENTER | Age: 57
Discharge: HOME | End: 2022-07-11
Payer: COMMERCIAL

## 2022-07-11 ENCOUNTER — ANESTHESIA EVENT (OUTPATIENT)
Dept: GASTROENTEROLOGY | Facility: AMBULATORY SURGERY CENTER | Age: 57
End: 2022-07-11

## 2022-07-11 ENCOUNTER — ANESTHESIA (OUTPATIENT)
Dept: GASTROENTEROLOGY | Facility: AMBULATORY SURGERY CENTER | Age: 57
End: 2022-07-11

## 2022-07-11 VITALS
TEMPERATURE: 98.8 F | HEIGHT: 59 IN | SYSTOLIC BLOOD PRESSURE: 124 MMHG | OXYGEN SATURATION: 100 % | HEART RATE: 67 BPM | RESPIRATION RATE: 18 BRPM | WEIGHT: 130 LBS | BODY MASS INDEX: 26.21 KG/M2 | DIASTOLIC BLOOD PRESSURE: 74 MMHG

## 2022-07-11 DIAGNOSIS — Z86.010 HISTORY OF COLON POLYPS: ICD-10-CM

## 2022-07-11 DIAGNOSIS — Z80.0 FAMILY HISTORY OF COLON CANCER: ICD-10-CM

## 2022-07-11 DIAGNOSIS — K52.9 COLITIS: ICD-10-CM

## 2022-07-11 PROCEDURE — 45378 DIAGNOSTIC COLONOSCOPY: CPT | Performed by: INTERNAL MEDICINE

## 2022-07-11 RX ORDER — SODIUM CHLORIDE 9 MG/ML
INJECTION, SOLUTION INTRAVENOUS CONTINUOUS PRN
Status: DISCONTINUED | OUTPATIENT
Start: 2022-07-11 | End: 2022-07-11

## 2022-07-11 RX ORDER — ONDANSETRON 2 MG/ML
INJECTION INTRAMUSCULAR; INTRAVENOUS AS NEEDED
Status: DISCONTINUED | OUTPATIENT
Start: 2022-07-11 | End: 2022-07-11

## 2022-07-11 RX ORDER — LIDOCAINE HYDROCHLORIDE 10 MG/ML
INJECTION, SOLUTION EPIDURAL; INFILTRATION; INTRACAUDAL; PERINEURAL AS NEEDED
Status: DISCONTINUED | OUTPATIENT
Start: 2022-07-11 | End: 2022-07-11

## 2022-07-11 RX ORDER — PROPOFOL 10 MG/ML
INJECTION, EMULSION INTRAVENOUS AS NEEDED
Status: DISCONTINUED | OUTPATIENT
Start: 2022-07-11 | End: 2022-07-11

## 2022-07-11 RX ADMIN — PROPOFOL 50 MG: 10 INJECTION, EMULSION INTRAVENOUS at 11:30

## 2022-07-11 RX ADMIN — PROPOFOL 50 MG: 10 INJECTION, EMULSION INTRAVENOUS at 11:39

## 2022-07-11 RX ADMIN — PROPOFOL 100 MG: 10 INJECTION, EMULSION INTRAVENOUS at 11:23

## 2022-07-11 RX ADMIN — PROPOFOL 50 MG: 10 INJECTION, EMULSION INTRAVENOUS at 11:28

## 2022-07-11 RX ADMIN — PROPOFOL 50 MG: 10 INJECTION, EMULSION INTRAVENOUS at 11:35

## 2022-07-11 RX ADMIN — SODIUM CHLORIDE: 9 INJECTION, SOLUTION INTRAVENOUS at 11:08

## 2022-07-11 RX ADMIN — ONDANSETRON 4 MG: 2 INJECTION INTRAMUSCULAR; INTRAVENOUS at 11:18

## 2022-07-11 RX ADMIN — PROPOFOL 50 MG: 10 INJECTION, EMULSION INTRAVENOUS at 11:26

## 2022-07-11 RX ADMIN — LIDOCAINE HYDROCHLORIDE 50 MG: 10 INJECTION, SOLUTION EPIDURAL; INFILTRATION; INTRACAUDAL; PERINEURAL at 11:23

## 2022-07-11 RX ADMIN — PROPOFOL 50 MG: 10 INJECTION, EMULSION INTRAVENOUS at 11:32

## 2022-07-11 NOTE — TELEPHONE ENCOUNTER
I called and spoke with Michael Goodman and made her aware that her arrival time is 10:15a this morning  She expressed understanding

## 2022-07-11 NOTE — ANESTHESIA POSTPROCEDURE EVALUATION
Post-Op Assessment Note    CV Status:  Stable  Pain Score: 0    Pain management: adequate     Mental Status:  Alert and awake   Hydration Status:  Euvolemic   PONV Controlled:  Controlled   Airway Patency:  Patent      Post Op Vitals Reviewed: Yes      Staff: Anesthesiologist, CRNA         No complications documented      BP   108/71   Temp      Pulse  78   Resp   20   SpO2   98%

## 2022-07-11 NOTE — ANESTHESIA PREPROCEDURE EVALUATION
Procedure:  COLONOSCOPY    Relevant Problems   ENDO   (+) Acquired hypothyroidism      GI/HEPATIC   (+) Gastroesophageal reflux disease without esophagitis      NEURO/PSYCH   (+) Generalized anxiety disorder      PULMONARY   (+) AMY (obstructive sleep apnea)   (+) Obstructive sleep apnea syndrome        Physical Exam    Airway    Mallampati score: III  TM Distance: >3 FB  Neck ROM: full     Dental   No notable dental hx     Cardiovascular  Cardiovascular exam normal    Pulmonary  Pulmonary exam normal     Other Findings        Anesthesia Plan  ASA Score- 2     Anesthesia Type- IV sedation with anesthesia with ASA Monitors  Additional Monitors:   Airway Plan:           Plan Factors-Exercise tolerance (METS): >4 METS  Chart reviewed  Patient summary reviewed  Induction- intravenous  Postoperative Plan-     Informed Consent- Anesthetic plan and risks discussed with patient

## 2022-07-11 NOTE — DISCHARGE SUMMARY
Discharge Summary - Jordan Sousa 62 y o  female MRN: [id number]    Unit/Bed#:  Encounter: [id number]    Admission Date:  07/11/2022    Admitting Diagnosis: History of colon polyps [Z86 010]  Family history of colon cancer [Z80 0]  Colitis [K52 9]    HPI:  History of colon polyp  Family history of colon cancer  Procedures Performed: No orders of the defined types were placed in this encounter  Summary of Hospital Course: Tolerated procedure well    Significant Findings, Care, Treatment and Services Provided:  Diverticulosis noted  Otherwise normal colonoscopy  Complications:  None    Discharge Diagnosis:  See above    Medical Problems             Resolved Problems  Date Reviewed: 6/13/2022   None                 Condition at Discharge: good         Discharge instructions/Information to patient and family:   See after visit summary for information provided to patient and family  Provisions for Follow-Up Care:  See after visit summary for information related to follow-up care and any pertinent home health orders        PCP: Madyson Moeller DO    Disposition: Home

## 2022-07-11 NOTE — PROGRESS NOTES
One miss by HOWIE, RN right lower forearm- unable to thread catheter, pressure dressing applied  No redness, pain or swelling noted

## 2022-07-11 NOTE — INTERVAL H&P NOTE
H&P reviewed  After examining the patient I find no changes in the patients condition since the H&P had been written      Vitals:    07/11/22 1057   BP: 147/96   Pulse: 86   Resp: 18   Temp: 98 8 °F (37 1 °C)   SpO2: 100%

## 2022-07-11 NOTE — DISCHARGE INSTRUCTIONS
Colonoscopy   WHAT YOU NEED TO KNOW:   A colonoscopy is a procedure to examine the inside of your colon (intestine) with a scope  Polyps or tissue growths may have been removed during your colonoscopy  It is normal to feel bloated and to have some abdominal discomfort  You should be passing gas  If you have hemorrhoids or you had polyps removed, you may have a small amount of bleeding  DISCHARGE INSTRUCTIONS:   Seek care immediately if:   You have sudden, severe abdominal pain  You have problems swallowing  You have a large amount of black, sticky bowel movements or blood in your bowel movements  You have sudden trouble breathing  You feel weak, lightheaded, or faint or your heart beats faster than normal for you  Contact your healthcare provider if:   You have a fever and chills  You have nausea or are vomiting  Your abdomen is bloated or feels full and hard  You have abdominal pain  You have black, sticky bowel movements or blood in your bowel movements  You have not had a bowel movement for 3 days after your procedure  You have rash or hives  You have questions or concerns about your procedure  Activity:   Do not lift, strain, or run for 24 hours after your procedure  Rest after your procedure  You have been given medicine to relax you  Do not drive or make important decisions until the day after your procedure  Return to your normal activity as directed  Relieve gas and discomfort from bloating by lying on your right side with a heating pad on your abdomen  You may need to take short walks to help the gas move out  Eat small meals until bloating is relieved  Follow up with your healthcare provider as directed: Write down your questions so you remember to ask them during your visits  If you take a blood thinner, please review the specific instructions from your endoscopist about when you should resume it   These can be found in the Recommendation and Your Medication list sections of this After Visit Summary    ;l

## 2022-07-11 NOTE — H&P
History and Physical -  [facility]  Carlos Murillo 62 y o  female MRN: [id number]                  HPI: Carlos Murillo is a 62y o  year old female who presents for a screening colonoscopy  Patient has family history of colon cancer in father  She had a colonoscopy one year ago which revealed colon polyps and colitis  There is family history Crohn disease      REVIEW OF SYSTEMS: Per the HPI, and otherwise unremarkable      Historical Information   Past Medical History:   Diagnosis Date    Allergic     Seasonal    Anxiety     BBB (bundle branch block)     left- monitored by PCP-saw cardiologist in past, had workup-no problems 15 years ago    Clotting disorder ([address] )     Platelet disorder-idiopathic    Depression     Disease of thyroid gland     Hypothyroidism    Fatty liver     Fibromyalgia, primary     GERD (gastroesophageal reflux disease)     Hiatal hernia     Irritable bowel syndrome     Kidney stone     Palpitation     occasional    Sleep apnea     TMJ (dislocation of temporomandibular joint)     cannot keep mouth open for long periods of time, wears mouth guard at bedtime     Past Surgical History:   Procedure Laterality Date    APPENDECTOMY      CHOLECYSTECTOMY      COLONOSCOPY      EGD      GALLBLADDER SURGERY      HYSTERECTOMY  2010    NASAL SEPTUM SURGERY      OOPHORECTOMY  2010    UPPER GASTROINTESTINAL ENDOSCOPY       Social History   Social History     Substance and Sexual Activity   Alcohol Use Not Currently    Alcohol/week: 0 0 standard drinks     Social History     Substance and Sexual Activity   Drug Use Never     Social History     Tobacco Use   Smoking Status Never Smoker   Smokeless Tobacco Never Used     Family History   Problem Relation Age of Onset    Heart attack Mother     Stroke Mother     Hypertension Mother     Hyperlipidemia Mother     Colon cancer Father 79    Diabetes Father     Cancer Father         Colon    No Known Problems Sister    Alanna Oliver No Known Problems Daughter     No Known Problems Maternal Grandmother     Prostate cancer Maternal Grandfather         age dx unknown    Breast cancer Paternal Grandmother         age dx unknown    No Known Problems Paternal Grandfather     No Known Problems Maternal Aunt     No Known Problems Maternal Aunt     No Known Problems Paternal Aunt        Meds/Allergies       Current Outpatient Medications:     cetirizine (ZyrTEC) 10 mg tablet    cyclobenzaprine (FLEXERIL) 10 mg tablet    docusate sodium (COLACE) 100 mg capsule    famotidine (PEPCID) 20 mg tablet    Flaxseed, Linseed, (FLAXSEED OIL PO)    levothyroxine 75 mcg tablet    LORazepam (Ativan) 0 5 mg tablet    ondansetron (ZOFRAN) 8 mg tablet    Probiotic Product (PROBIOTIC PO)    venlafaxine (EFFEXOR-XR) 37 5 mg 24 hr capsule    dicyclomine (BENTYL) 10 mg capsule    predniSONE 10 mg tablet  No current facility-administered medications for this encounter      Facility-Administered Medications Ordered in Other Encounters:     ondansetron (ZOFRAN) injection, , Intravenous, PRN, 4 mg at 07/11/22 1118    sodium chloride 0 9 % infusion, , Intravenous, Continuous PRN, New Bag at 07/11/22 1108    Allergies   Allergen Reactions    Aspirin Other (See Comments)     Reaction Date: 08Jun2011;   Patient has platelet disorder        Erythromycin Base Other (See Comments)     rash    Iodine - Food Allergy Other (See Comments) and Vomiting     ivp dye    Morphine Headache and Other (See Comments)     migraine headaches      Prochlorperazine Other (See Comments)     Lower half of body went numb    Quinolones Abdominal Pain     tendinitis    Metoclopramide Anxiety, Other (See Comments) and Rash     Reaction Date: 08Jun2011;       Tetracycline Rash and Other (See Comments)     Reaction Date: [id number];          Objective     /96   Pulse 86   Temp 98 8 °F (37 1 °C) (Temporal)   Resp 18   Ht 4' 11" (1 499 m)   Wt 59 kg (130 lb)   SpO2 100% BMI 26 26 kg/m²       PHYSICAL EXAM    Gen: NAD  Head: NCAT  CV: RRR  CHEST: Clear  ABD: soft, NT/ND  EXT: no edema      ASSESSMENT/PLAN:  This is a 62y o  year old female here for colonoscopy, and she is stable and optimized for her procedure

## 2022-07-17 NOTE — ANESTHESIA PREPROCEDURE EVALUATION
Procedure:  COLONOSCOPY  EGD    Relevant Problems   ENDO   (+) Acquired hypothyroidism      GI/HEPATIC   (+) Gastroesophageal reflux disease without esophagitis      PULMONARY   (+) Obstructive sleep apnea syndrome        Physical Exam    Airway    Mallampati score: II  TM Distance: >3 FB  Neck ROM: full     Dental   No notable dental hx     Cardiovascular  Cardiovascular exam normal    Pulmonary  Breath sounds clear to auscultation,     Other Findings  AMY      Anesthesia Plan  ASA Score- 2     Anesthesia Type- IV sedation with anesthesia with ASA Monitors  Additional Monitors:   Airway Plan:           Plan Factors-Exercise tolerance (METS): >4 METS  Chart reviewed  Existing labs reviewed  Patient is not a current smoker  Patient instructed to abstain from smoking on day of procedure  Patient did not smoke on day of surgery  Induction- intravenous  Postoperative Plan-     Informed Consent- Anesthetic plan and risks discussed with patient  I personally reviewed this patient with the CRNA  Discussed and agreed on the Anesthesia Plan with the CRNA  Leonela Pierre No

## 2022-07-29 DIAGNOSIS — K29.00 ACUTE SUPERFICIAL GASTRITIS WITHOUT HEMORRHAGE: ICD-10-CM

## 2022-07-29 RX ORDER — FAMOTIDINE 20 MG/1
20 TABLET, FILM COATED ORAL 2 TIMES DAILY
Qty: 180 TABLET | Refills: 3 | Status: SHIPPED | OUTPATIENT
Start: 2022-07-29 | End: 2022-08-17 | Stop reason: SDUPTHER

## 2022-07-29 NOTE — TELEPHONE ENCOUNTER
Patient called and states that she spoke with [facility] and they do not have the script for famotidine that was sent on 06/13/22    Please send new script for 90 days

## 2022-08-04 ENCOUNTER — OFFICE VISIT (OUTPATIENT)
Dept: GASTROENTEROLOGY | Facility: CLINIC | Age: 57
End: 2022-08-04
Payer: COMMERCIAL

## 2022-08-04 VITALS
HEIGHT: 59 IN | HEART RATE: 91 BPM | DIASTOLIC BLOOD PRESSURE: 76 MMHG | WEIGHT: 135 LBS | BODY MASS INDEX: 27.21 KG/M2 | SYSTOLIC BLOOD PRESSURE: 111 MMHG

## 2022-08-04 DIAGNOSIS — R11.0 NAUSEA: ICD-10-CM

## 2022-08-04 DIAGNOSIS — Z83.79 FAMILY HISTORY OF INFLAMMATORY BOWEL DISEASE: ICD-10-CM

## 2022-08-04 DIAGNOSIS — Z86.010 HISTORY OF COLON POLYPS: Primary | ICD-10-CM

## 2022-08-04 DIAGNOSIS — K58.2 IRRITABLE BOWEL SYNDROME WITH BOTH CONSTIPATION AND DIARRHEA: ICD-10-CM

## 2022-08-04 DIAGNOSIS — Z80.0 FAMILY HISTORY OF COLON CANCER: ICD-10-CM

## 2022-08-04 DIAGNOSIS — K31.7 GASTRIC POLYP: ICD-10-CM

## 2022-08-04 DIAGNOSIS — K21.9 GASTROESOPHAGEAL REFLUX DISEASE, UNSPECIFIED WHETHER ESOPHAGITIS PRESENT: ICD-10-CM

## 2022-08-04 PROCEDURE — 99214 OFFICE O/P EST MOD 30 MIN: CPT | Performed by: INTERNAL MEDICINE

## 2022-08-04 RX ORDER — OMEPRAZOLE 20 MG/1
20 CAPSULE, DELAYED RELEASE ORAL DAILY
Qty: 90 CAPSULE | Refills: 1 | Status: SHIPPED | OUTPATIENT
Start: 2022-08-04 | End: 2022-08-17 | Stop reason: SDUPTHER
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
## 2022-08-05 DIAGNOSIS — F41.1 GENERALIZED ANXIETY DISORDER: ICD-10-CM

## 2022-08-05 NOTE — TELEPHONE ENCOUNTER
Maximilian Hogue just wanted to let us know her insurance got new mail service and they will be contacting us to get new 90 day supplies her meds and switch pharmacies

## 2022-08-06 RX ORDER — LORAZEPAM 0.5 MG/1
TABLET ORAL
Qty: 90 TABLET | Refills: 0 | Status: SHIPPED | OUTPATIENT
Start: 2022-08-06 | End: 2022-09-04

## 2022-08-17 ENCOUNTER — TELEMEDICINE (OUTPATIENT)
Dept: FAMILY MEDICINE CLINIC | Facility: CLINIC | Age: 57
End: 2022-08-17
Payer: COMMERCIAL

## 2022-08-17 DIAGNOSIS — R11.0 NAUSEA: ICD-10-CM

## 2022-08-17 DIAGNOSIS — K29.00 ACUTE SUPERFICIAL GASTRITIS WITHOUT HEMORRHAGE: ICD-10-CM

## 2022-08-17 DIAGNOSIS — K21.9 GASTROESOPHAGEAL REFLUX DISEASE, UNSPECIFIED WHETHER ESOPHAGITIS PRESENT: ICD-10-CM

## 2022-08-17 DIAGNOSIS — J01.00 ACUTE NON-RECURRENT MAXILLARY SINUSITIS: Primary | ICD-10-CM

## 2022-08-17 DIAGNOSIS — F41.1 GENERALIZED ANXIETY DISORDER: ICD-10-CM

## 2022-08-17 DIAGNOSIS — E03.9 ACQUIRED HYPOTHYROIDISM: ICD-10-CM

## 2022-08-17 PROCEDURE — 99213 OFFICE O/P EST LOW 20 MIN: CPT | Performed by: FAMILY MEDICINE

## 2022-08-17 RX ORDER — VENLAFAXINE HYDROCHLORIDE 37.5 MG/1
37.5 CAPSULE, EXTENDED RELEASE ORAL
Qty: 90 CAPSULE | Refills: 1 | Status: SHIPPED | OUTPATIENT
Start: 2022-08-17

## 2022-08-17 RX ORDER — OMEPRAZOLE 20 MG/1
20 CAPSULE, DELAYED RELEASE ORAL DAILY
Qty: 90 CAPSULE | Refills: 1 | Status: SHIPPED | OUTPATIENT
Start: 2022-08-17

## 2022-08-17 RX ORDER — AMOXICILLIN AND CLAVULANATE POTASSIUM 875; 125 MG/1; MG/1
1 TABLET, FILM COATED ORAL EVERY 12 HOURS SCHEDULED
Qty: 14 TABLET | Refills: 0 | Status: SHIPPED | OUTPATIENT
Start: 2022-08-17 | End: 2022-08-24

## 2022-08-17 RX ORDER — LEVOTHYROXINE SODIUM 0.07 MG/1
75 TABLET ORAL DAILY
Qty: 90 TABLET | Refills: 1 | Status: SHIPPED | OUTPATIENT
Start: 2022-08-17

## 2022-08-17 RX ORDER — FAMOTIDINE 20 MG/1
20 TABLET, FILM COATED ORAL 2 TIMES DAILY
Qty: 180 TABLET | Refills: 3 | Status: SHIPPED | OUTPATIENT
Start: 2022-08-17

## 2022-08-17 RX ORDER — AZELASTINE 1 MG/ML
2 SPRAY, METERED NASAL 2 TIMES DAILY
Qty: 30 ML | Refills: 1 | Status: SHIPPED | OUTPATIENT
Start: 2022-08-17 | End: 2022-09-16

## 2022-08-17 NOTE — PROGRESS NOTES
Virtual Regular Visit    Verification of patient location:    Patient is located in the following state in which I hold an active license [place]      Assessment/Plan:    Problem List Items Addressed This Visit        Endocrine    Acquired hypothyroidism    Relevant Medications    levothyroxine 75 mcg tablet       Other    Generalized anxiety disorder    Relevant Medications    venlafaxine (EFFEXOR-XR) 37 5 mg 24 hr capsule      Other Visit Diagnoses     Acute non-recurrent maxillary sinusitis    -  Primary    Relevant Medications    amoxicillin-clavulanate (AUGMENTIN) 875-125 mg per tablet    azelastine (ASTELIN) 0 1 % nasal spray    Acute superficial gastritis without hemorrhage        Relevant Medications    famotidine (PEPCID) 20 mg tablet    omeprazole (PriLOSEC) 20 mg delayed release capsule    Gastroesophageal reflux disease, unspecified whether esophagitis present        Relevant Medications    famotidine (PEPCID) 20 mg tablet    omeprazole (PriLOSEC) 20 mg delayed release capsule    Nausea        Relevant Medications    omeprazole (PriLOSEC) 20 mg delayed release capsule               Reason for visit is   Chief Complaint   Patient presents with    Virtual Regular Visit        Encounter provider Mady Dela Cruz DO    Provider located at [address] [place] [address] [phone]      Recent Visits  No visits were found meeting these conditions  Showing recent visits within past 7 days and meeting all other requirements  Today's Visits  Date Type Provider Dept   08/17/22 Telemedicine Mady Dela Cruz DO [facility] [place]   Showing today's visits and meeting all other requirements  Future Appointments  No visits were found meeting these conditions  Showing future appointments within next 150 days and meeting all other requirements       The patient was identified by name and date of birth   Wade Arthur was informed that this is a telemedicine visit and that the visit is being conducted through [address] [facility] and patient was informed that this is a secure, HIPAA-compliant platform  She agrees to proceed     My office door was closed  No one else was in the room  She acknowledged consent and understanding of privacy and security of the video platform  The patient has agreed to participate and understands they can discontinue the visit at any time  Patient is aware this is a billable service  Subjective  Ariel Bettencourt is a 62 y o  female for sinus pressure and congestion   Patient with sinus pressure and congestion over the last several weeks  Symptoms are mild-to-moderate  Patient denies any GI complaints  She did a home COVID test that was negative         Past Medical History:   Diagnosis Date    Allergic     Seasonal    Anxiety     BBB (bundle branch block)     left- monitored by PCP-saw cardiologist in past, had workup-no problems 15 years ago    Clot[address] disorder [address] [address] )     Platelet disorder-idiopathic    Depression     Disease of thyroid gland     Hypothyroidism    Fatty liver     Fibromyalgia, primary     GERD (gastroesophageal reflux disease)     Hiatal hernia     Irritable bowel syndrome     Kidney stone     Palpitation     occasional    Sleep apnea     TMJ (dislocation of temporomandibular joint)     cannot keep mouth open for long periods of time, wears mouth guard at bedtime       Past Surgical History:   Procedure Laterality Date    APPENDECTOMY      CHOLECYSTECTOMY      COLONOSCOPY      EGD      GALLBLADDER SURGERY      HYSTERECTOMY  2010    NASAL SEPTUM SURGERY      OOPHORECTOMY  2010    UPPER GASTROINTESTINAL ENDOSCOPY         Current Outpatient Medications   Medication Sig Dispense Refill    amoxicillin-clavulanate (AUGMENTIN) 875-125 mg per tablet Take 1 tablet by mouth every 12 (twelve) hours for 7 days 14 tablet 0    azelastine (ASTELIN) 0 1 % nasal spray 2 sprays into each nostril 2 (two) times a day Use in each nostril as directed 30 mL 1    famotidine (PEPCID) 20 mg tablet Take 1 tablet (20 mg total) by mouth 2 (two) times a day 180 tablet 3    levothyroxine 75 mcg tablet Take 1 tablet (75 mcg total) by mouth daily 90 tablet 1    omeprazole (PriLOSEC) 20 mg delayed release capsule Take 1 capsule (20 mg total) by mouth daily 90 capsule 1    venlafaxine (EFFEXOR-XR) 37 5 mg 24 hr capsule Take 1 capsule (37 5 mg total) by mouth daily with breakfast 90 capsule 1    cetirizine (ZyrTEC) 10 mg tablet Take 10 mg by mouth daily      cyclobenzaprine (FLEXERIL) 10 mg tablet Take 1 tablet (10 mg total) by mouth 3 (three) times a day as needed for muscle spasms 30 tablet 2    dicyclomine (BENTYL) 10 mg capsule TAKE ONE CAPSULE BY MOUTH THREE TIMES DAILY 90 capsule 0    docusate sodium (COLACE) 100 mg capsule One capsule by mouth every other 60 capsule 1    Flaxseed, Linseed, (FLAXSEED OIL PO) Take by mouth daily      LORazepam (Ativan) 0 5 mg tablet Take 1 every 8 hours as needed for anxiety and 2 at bedtime if needed  90 tablet 0    ondansetron (ZOFRAN) 8 mg tablet Take 1 tablet (8 mg total) by mouth every 8 (eight) hours as needed for nausea or vomiting 30 tablet 0    predniSONE 10 mg tablet 6 tablets daily, all at one time, with food  Then on day #4 decrease by 1 pill each day until finished  33 tablet 0    Probiotic Product (PROBIOTIC PO) Take by mouth       No current facility-administered medications for this visit          Allergies   Allergen Reactions    Aspirin Other (See Comments)     Reaction Date: 08Jun2011;   Patient has platelet disorder        Erythromycin Base Other (See Comments)     rash    Iodine - Food Allergy Other (See Comments) and Vomiting     ivp dye    Morphine Headache and Other (See Comments)     migraine headaches      Prochlorperazine Other (See Comments)     Lower half of body went numb    Quinolones Abdominal Pain     tendinitis    Metoclopramide Anxiety, Other (See Comments) and Rash Reaction Date: 08Jun2011;       Tetracycline Rash and Other (See Comments)     Reaction Date: 08Jun2011;          Review of Systems   Constitutional: Negative  Negative for fever  HENT: Positive for congestion  Eyes: Negative  Respiratory: Negative  Negative for cough  Cardiovascular: Negative  Gastrointestinal: Negative  Endocrine: Negative  Genitourinary: Negative  Musculoskeletal: Negative  Skin: Negative  Allergic/Immunologic: Negative  Neurological: Negative  Hematological: Negative  Psychiatric/Behavioral: Negative  Video Exam    There were no vitals filed for this visit  Physical Exam  Vitals reviewed  Constitutional:       Appearance: She is well-developed  HENT:      Head: Normocephalic and atraumatic  Right Ear: External ear normal  Tympanic membrane is not erythematous or bulging  Left Ear: External ear normal  Tympanic membrane is not erythematous or bulging  Nose: Nose normal       Mouth/Throat:      Mouth: No oral lesions  Pharynx: No oropharyngeal exudate  Eyes:      General: No scleral icterus  Right eye: No discharge  Left eye: No discharge  Conjunctiva/sclera: Conjunctivae normal    Neck:      Thyroid: No thyromegaly  Cardiovascular:      Rate and Rhythm: Normal rate and regular rhythm  Heart sounds: Normal heart sounds  No murmur heard  No friction rub  No gallop  Pulmonary:      Effort: Pulmonary effort is normal  No respiratory distress  Breath sounds: No wheezing or rales  Chest:      Chest wall: No tenderness  Abdominal:      General: Bowel sounds are normal  There is no distension  Palpations: Abdomen is soft  There is no mass  Tenderness: There is no abdominal tenderness  There is no guarding or rebound  Musculoskeletal:         General: No tenderness or deformity  Normal range of motion  Cervical back: Normal range of motion and neck supple  Lymphadenopathy:      Cervical: No cervical adenopathy  Skin:     General: Skin is warm and dry  Coloration: Skin is not pale  Findings: No erythema or rash  Neurological:      Mental Status: She is alert and oriented to person, place, and time  Cranial Nerves: No cranial nerve deficit  Motor: No abnormal muscle tone  Coordination: Coordination normal       Deep Tendon Reflexes: Reflexes are normal and symmetric     Psychiatric:         Behavior: Behavior normal           I spent 15 minutes directly with the patient during this visit

## 2022-08-25 ENCOUNTER — TELEPHONE (OUTPATIENT)
Dept: FAMILY MEDICINE CLINIC | Facility: CLINIC | Age: 57
End: 2022-08-25

## 2022-08-25 DIAGNOSIS — B37.31 VAGINAL CANDIDIASIS: Primary | ICD-10-CM

## 2022-08-25 RX ORDER — FLUCONAZOLE 150 MG/1
150 TABLET ORAL ONCE
Qty: 1 TABLET | Refills: 0 | Status: SHIPPED | OUTPATIENT
Start: 2022-08-25 | End: 2022-08-25

## 2022-08-25 NOTE — TELEPHONE ENCOUNTER
Pt LM on refill line requesting a script be sent for diflucan  Pt states she developed a yeast infection from abx        To be sent to [facility] in [facility] (currently residing in that area)

## 2022-08-31 ENCOUNTER — HOSPITAL ENCOUNTER (OUTPATIENT)
Dept: RADIOLOGY | Age: 57
Discharge: HOME | End: 2022-08-31
Payer: COMMERCIAL

## 2022-08-31 VITALS — BODY MASS INDEX: 27.21 KG/M2 | HEIGHT: 59 IN | WEIGHT: 135 LBS

## 2022-08-31 DIAGNOSIS — Z12.31 ENCOUNTER FOR SCREENING MAMMOGRAM FOR MALIGNANT NEOPLASM OF BREAST: ICD-10-CM

## 2022-08-31 PROCEDURE — 77063 BREAST TOMOSYNTHESIS BI: CPT

## 2022-08-31 PROCEDURE — 77067 SCR MAMMO BI INCL CAD: CPT

## 2022-11-04 DIAGNOSIS — F41.1 GENERALIZED ANXIETY DISORDER: ICD-10-CM

## 2022-11-04 NOTE — TELEPHONE ENCOUNTER
Protocol Fail   LORazepam (Ativan) 0 5 mg tablet         Sig: Take 1 every 8 hours as needed for anxiety and 2 at bedtime if needed      Disp:  90 tablet    Refills:  0    Start: 11/4/2022 - 12/3/2022    Class: Normal    For: Generalized anxiety disorder    Last ordered: 3 months ago by Eileen Sterling MD     Psychiatry:  Anxiolytics/Hypnotics Failed 11/04/2022 02:44 PM   Protocol Details  This refill cannot be delegated    No hospital admission in the last 30 days    Valid encounter within last 6 months

## 2022-11-07 RX ORDER — LORAZEPAM 0.5 MG/1
TABLET ORAL
Qty: 90 TABLET | Refills: 0 | Status: SHIPPED | OUTPATIENT
Start: 2022-11-07 | End: 2022-12-03

## 2022-11-08 ENCOUNTER — TELEPHONE (OUTPATIENT)
Dept: PSYCHIATRY | Facility: CLINIC | Age: 57
End: 2022-11-08

## 2022-11-08 NOTE — TELEPHONE ENCOUNTER
Patient has been added to the Adult Psych wait list  Confirmed insurance, needs of service, and location preferences

## 2022-11-18 ENCOUNTER — HOSPITAL ENCOUNTER (OUTPATIENT)
Dept: VASCULAR ULTRASOUND | Facility: HOSPITAL | Age: 57
Discharge: HOME | End: 2022-11-18
Attending: SURGERY

## 2022-11-18 DIAGNOSIS — Z13.6 ENCOUNTER FOR SCREENING FOR VASCULAR DISEASE: ICD-10-CM

## 2023-01-17 ENCOUNTER — RA CDI HCC (OUTPATIENT)
Dept: OTHER | Facility: HOSPITAL | Age: 58
End: 2023-01-17

## 2023-01-17 NOTE — PROGRESS NOTES
[facility]  coding opportunities       Chart reviewed, no opportunity found: CHART REVIEWED, NO OPPORTUNITY FOUND        Patients Insurance        Commercial Insurance: [employer]

## 2023-01-20 ENCOUNTER — OFFICE VISIT (OUTPATIENT)
Dept: FAMILY MEDICINE CLINIC | Facility: CLINIC | Age: 58
End: 2023-01-20

## 2023-01-20 VITALS
DIASTOLIC BLOOD PRESSURE: 78 MMHG | HEART RATE: 82 BPM | BODY MASS INDEX: 28.75 KG/M2 | TEMPERATURE: 97.8 F | SYSTOLIC BLOOD PRESSURE: 132 MMHG | OXYGEN SATURATION: 97 % | WEIGHT: 142.6 LBS | HEIGHT: 59 IN

## 2023-01-20 DIAGNOSIS — F41.0 PANIC ATTACKS: ICD-10-CM

## 2023-01-20 DIAGNOSIS — E03.9 ACQUIRED HYPOTHYROIDISM: ICD-10-CM

## 2023-01-20 DIAGNOSIS — F41.1 GENERALIZED ANXIETY DISORDER: Primary | ICD-10-CM

## 2023-01-20 DIAGNOSIS — K58.2 IRRITABLE BOWEL SYNDROME WITH BOTH CONSTIPATION AND DIARRHEA: ICD-10-CM

## 2023-01-20 RX ORDER — PROPRANOLOL HYDROCHLORIDE 40 MG/1
40 TABLET ORAL 2 TIMES DAILY
Qty: 60 TABLET | Refills: 1 | Status: SHIPPED | OUTPATIENT
Start: 2023-01-20

## 2023-01-20 RX ORDER — LORAZEPAM 0.5 MG/1
TABLET ORAL
Qty: 90 TABLET | Refills: 0 | Status: SHIPPED | OUTPATIENT
Start: 2023-01-20 | End: 2023-01-24

## 2023-01-20 RX ORDER — LEVOTHYROXINE SODIUM 0.07 MG/1
75 TABLET ORAL DAILY
Qty: 90 TABLET | Refills: 1 | Status: SHIPPED | OUTPATIENT
Start: 2023-01-20

## 2023-01-20 RX ORDER — VENLAFAXINE HYDROCHLORIDE 37.5 MG/1
37.5 CAPSULE, EXTENDED RELEASE ORAL
Qty: 90 CAPSULE | Refills: 1 | Status: SHIPPED | OUTPATIENT
Start: 2023-01-20

## 2023-01-20 RX ORDER — DICYCLOMINE HYDROCHLORIDE 10 MG/1
10 CAPSULE ORAL 3 TIMES DAILY
Qty: 90 CAPSULE | Refills: 0 | Status: SHIPPED | OUTPATIENT
Start: 2023-01-20
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
## 2023-01-23 ENCOUNTER — TELEPHONE (OUTPATIENT)
Dept: PSYCHIATRY | Facility: CLINIC | Age: 58
End: 2023-01-23

## 2023-01-23 NOTE — TELEPHONE ENCOUNTER
Called patient regarding referral to be placed on proper wait list  LVM for patient to call intake dept ([phone]) back

## 2023-01-24 DIAGNOSIS — F41.1 GENERALIZED ANXIETY DISORDER: ICD-10-CM

## 2023-01-24 RX ORDER — LORAZEPAM 0.5 MG/1
TABLET ORAL
Qty: 90 TABLET | Refills: 0 | Status: SHIPPED | OUTPATIENT
Start: 2023-01-24

## 2023-01-26 NOTE — TELEPHONE ENCOUNTER
Called patient regarding referral to be placed on proper wait list  LVM for patient to call intake dept ([phone]) back

## 2023-01-27 NOTE — TELEPHONE ENCOUNTER
Pt returned intake department's call  Writer informed her that there is a routine referral for services but there are no openings available at this time  Writer offered her to place her on wait list  Pt stated that she will try another places and will call back if she can't find a provider who schedule her

## 2023-03-27 ENCOUNTER — TELEPHONE (OUTPATIENT)
Dept: PSYCHIATRY | Facility: CLINIC | Age: 58
End: 2023-03-27

## 2023-03-28 ENCOUNTER — OFFICE VISIT (OUTPATIENT)
Dept: URGENT CARE | Facility: CLINIC | Age: 58
End: 2023-03-28

## 2023-03-28 VITALS
TEMPERATURE: 96 F | SYSTOLIC BLOOD PRESSURE: 135 MMHG | HEART RATE: 103 BPM | RESPIRATION RATE: 16 BRPM | DIASTOLIC BLOOD PRESSURE: 85 MMHG | OXYGEN SATURATION: 100 %

## 2023-03-28 DIAGNOSIS — M54.50 ACUTE BILATERAL LOW BACK PAIN WITHOUT SCIATICA: Primary | ICD-10-CM

## 2023-03-28 RX ORDER — KETOROLAC TROMETHAMINE 30 MG/ML
30 INJECTION, SOLUTION INTRAMUSCULAR; INTRAVENOUS ONCE
Status: COMPLETED | OUTPATIENT
Start: 2023-03-28 | End: 2023-03-28

## 2023-03-28 RX ORDER — PREDNISONE 20 MG/1
40 TABLET ORAL DAILY
Qty: 8 TABLET | Refills: 0 | Status: SHIPPED | OUTPATIENT
Start: 2023-03-28 | End: 2023-04-01

## 2023-03-28 RX ADMIN — KETOROLAC TROMETHAMINE 30 MG: 30 INJECTION, SOLUTION INTRAMUSCULAR; INTRAVENOUS at 08:33

## 2023-03-28 NOTE — PROGRESS NOTES
"  [facility]        NAME: Armand Da Silva is a 62 y o  female  : 1965    MRN: [id number]  DATE: 2023  TIME: 8:27 AM    Assessment and Plan   Acute bilateral low back pain without sciatica [M54 50]  1  Acute bilateral low back pain without sciatica  ketorolac (TORADOL) injection 30 mg    predniSONE 20 mg tablet        Patient presents with c/o lumbar back   States about a week ago \" tweaked\" her back  Last night she bent over to get something out of the over and it hurt worse  Tried a flexeril  States in past has has this past and a shot of toradol helped  Assessment notes notes no brusing, swelling  Paiun radiates across lower back  No numbness/tingling  No dysuria  toradol given in office  discussed symptom management  Patient Instructions       Follow up with PCP as needed    Chief Complaint     Chief Complaint   Patient presents with   • Back Pain     Pt reports lower back pain that radiates to pelvic area  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 she felt a tweak in her lower back  Then last night, she went to remove something from the oven and felt a pop  History of Present Illness       Patient presents with c/o lumbar back   States about a week ago \" tweaked\" her back  Last night she bent over to get something out of the over and it hurt worse  Tried a flexeril  States in past has has this past and a shot of toradol helped  Assessment notes notes no brusing, swelling  Paiun radiates across lower back  No numbness/tingling  No dysuria  toradol given in office  discussed symptom management  Review of Systems   Review of Systems   Constitutional: Negative for chills, fatigue and fever  HENT: Negative for postnasal drip and sore throat  Respiratory: Negative for cough and shortness of breath  Cardiovascular: Negative for chest pain and palpitations  Gastrointestinal: Negative for abdominal pain, nausea and vomiting     Genitourinary: Negative " for dysuria  Musculoskeletal: Positive for back pain  Negative for gait problem and joint swelling  Skin: Negative for rash  Neurological: Negative for dizziness, syncope, light-headedness, numbness and headaches  Psychiatric/Behavioral: Negative for agitation and confusion  All other systems reviewed and are negative          Current Medications       Current Outpatient Medications:   •  LORazepam (ATIVAN) 0 5 mg tablet, TAKE 1 TABLET EVERY 8 HOURSAS NEEDED FOR ANXIETY AND 2TABLETS AT BEDTIME IF      NEEDED, Disp: 90 tablet, Rfl: 0  •  predniSONE 20 mg tablet, Take 2 tablets (40 mg total) by mouth daily for 4 days, Disp: 8 tablet, Rfl: 0  •  azelastine (ASTELIN) 0 1 % nasal spray, 2 sprays into each nostril 2 (two) times a day Use in each nostril as directed (Patient not taking: Reported on 1/20/2023), Disp: 30 mL, Rfl: 1  •  cetirizine (ZyrTEC) 10 mg tablet, Take 10 mg by mouth daily, Disp: , Rfl:   •  cyclobenzaprine (FLEXERIL) 10 mg tablet, Take 1 tablet (10 mg total) by mouth 3 (three) times a day as needed for muscle spasms (Patient not taking: Reported on 1/20/2023), Disp: 30 tablet, Rfl: 2  •  dicyclomine (BENTYL) 10 mg capsule, Take 1 capsule (10 mg total) by mouth 3 (three) times a day, Disp: 90 capsule, Rfl: 0  •  docusate sodium (COLACE) 100 mg capsule, One capsule by mouth every other, Disp: 60 capsule, Rfl: 1  •  famotidine (PEPCID) 20 mg tablet, Take 1 tablet (20 mg total) by mouth 2 (two) times a day, Disp: 180 tablet, Rfl: 3  •  Flaxseed, Linseed, (FLAXSEED OIL PO), Take by mouth daily (Patient not taking: Reported on 1/20/2023), Disp: , Rfl:   •  levothyroxine 75 mcg tablet, Take 1 tablet (75 mcg total) by mouth daily, Disp: 90 tablet, Rfl: 1  •  omeprazole (PriLOSEC) 20 mg delayed release capsule, Take 1 capsule (20 mg total) by mouth daily (Patient not taking: Reported on 1/20/2023), Disp: 90 capsule, Rfl: 1  •  ondansetron (ZOFRAN) 8 mg tablet, Take 1 tablet (8 mg total) by mouth every 8 (eight) hours as needed for nausea or vomiting, Disp: 30 tablet, Rfl: 0  •  Probiotic Product (PROBIOTIC PO), Take by mouth, Disp: , Rfl:   •  propranolol (INDERAL) 40 mg tablet, Take 1 tablet (40 mg total) by mouth 2 (two) times a day, Disp: 60 tablet, Rfl: 1  •  venlafaxine (EFFEXOR-XR) 37 5 mg 24 hr capsule, Take 1 capsule (37 5 mg total) by mouth daily with breakfast, Disp: 90 capsule, Rfl: 1    Current Facility-Administered Medications:   •  ketorolac (TORADOL) injection 30 mg, 30 mg, Intramuscular, Once, KHANH Romo    Current Allergies     Allergies as of 03/28/2023 - Reviewed 03/28/2023   Allergen Reaction Noted   • Aspirin Other (See Comments) 06/04/2004   • Erythromycin base Other (See Comments) 06/04/2004   • Iodine - food allergy Other (See Comments) and Vomiting 06/04/2004   • Morphine Headache and Other (See Comments) 09/09/2011   • Prochlorperazine Other (See Comments) 06/04/2004   • Quinolones Abdominal Pain 07/11/2022   • Metoclopramide Anxiety, Other (See Comments), and Rash 06/04/2004   • Tetracycline Rash and Other (See Comments) 06/04/2004            The following portions of the patient's history were reviewed and updated as appropriate: allergies, current medications, past family history, past medical history, past social history, past surgical history and problem list      Past Medical History:   Diagnosis Date   • Allergic     Seasonal   • Anxiety    • BBB (bundle branch block)     left- monitored by PCP-saw cardiologist in past, had workup-no problems 15 years ago   • Clotting disorder ([facility] [address] )     Platelet disorder-idiopathic   • Depression    • Disease of thyroid gland     Hypothyroidism   • Fatty liver    • Fibromyalgia, primary    • GERD (gastroesophageal reflux disease)    • Hiatal hernia    • Irritable bowel syndrome    • Kidney stone    • Palpitation     occasional   • Sleep apnea    • TMJ (dislocation of temporomandibular joint)     cannot keep mouth open for long periods of time, wears mouth guard at bedtime       Past Surgical History:   Procedure Laterality Date   • APPENDECTOMY     • CHOLECYSTECTOMY     • COLONOSCOPY     • EGD     • GALLBLADDER SURGERY     • HYSTERECTOMY  2010   • NASAL SEPTUM SURGERY     • OOPHORECTOMY  2010   • UPPER GASTROINTESTINAL ENDOSCOPY         Family History   Problem Relation Age of Onset   • Heart attack Mother    • Stroke Mother    • Hypertension Mother    • Hyperlipidemia Mother    • Colon cancer Father 79   • Diabetes Father    • Cancer Father         Colon   • No Known Problems Sister    • No Known Problems Daughter    • No Known Problems Maternal Grandmother    • Prostate cancer Maternal Grandfather         age dx unknown   • Breast cancer Paternal Grandmother         age dx unknown   • No Known Problems Paternal Grandfather    • No Known Problems Maternal Aunt    • No Known Problems Maternal Aunt    • No Known Problems Paternal Aunt          Medications have been verified  Objective   /85   Pulse 103   Temp (!) 96 °F (35 6 °C)   Resp 16   SpO2 100%   No LMP recorded  Patient has had a hysterectomy  Physical Exam     Physical Exam  Vitals reviewed  Constitutional:       General: She is not in acute distress  Appearance: Normal appearance  She is not ill-appearing  HENT:      Head: Normocephalic and atraumatic  Eyes:      Extraocular Movements: Extraocular movements intact  Conjunctiva/sclera: Conjunctivae normal    Musculoskeletal:         General: Tenderness present  No swelling, deformity or signs of injury  Cervical back: Normal range of motion  Skin:     General: Skin is warm  Findings: No bruising or erythema  Neurological:      General: No focal deficit present  Mental Status: She is alert     Psychiatric:         Mood and Affect: Mood normal          Behavior: Behavior normal          Judgment: Judgment normal

## 2023-03-30 ENCOUNTER — OFFICE VISIT (OUTPATIENT)
Dept: FAMILY MEDICINE CLINIC | Facility: CLINIC | Age: 58
End: 2023-03-30

## 2023-03-30 VITALS
HEIGHT: 59 IN | OXYGEN SATURATION: 99 % | DIASTOLIC BLOOD PRESSURE: 70 MMHG | TEMPERATURE: 98 F | WEIGHT: 142.2 LBS | SYSTOLIC BLOOD PRESSURE: 118 MMHG | HEART RATE: 106 BPM | BODY MASS INDEX: 28.67 KG/M2

## 2023-03-30 DIAGNOSIS — Z13.820 SCREENING FOR OSTEOPOROSIS: ICD-10-CM

## 2023-03-30 DIAGNOSIS — M25.551 RIGHT HIP PAIN: ICD-10-CM

## 2023-03-30 DIAGNOSIS — M54.50 LUMBAR PAIN: Primary | ICD-10-CM

## 2023-03-30 RX ORDER — PREDNISONE 10 MG/1
TABLET ORAL
Qty: 33 TABLET | Refills: 0 | Status: SHIPPED | OUTPATIENT
Start: 2023-03-30

## 2023-03-30 NOTE — PROGRESS NOTES
"Assessment/Plan: Time spent counseling reviewing treatment plan coordinating care and documentation was 30 minutes  Recommend return to office if no improvement  Consider orthopedic evaluation  1  Lumbar pain  -     Ambulatory Referral to Physical Therapy; Future  -     XR spine lumbar 2 or 3 views injury; Future; Expected date: 03/30/2023  -     predniSONE 10 mg tablet; 6 tablets daily, all at one time, with food  Then on day #4 decrease by 1 pill each day until finished  2  Right hip pain  -     Ambulatory Referral to Physical Therapy; Future  -     XR hip/pelv 2-3 vws right if performed; Future; Expected date: 03/30/2023  -     XR hip/pelv 2-3 vws left if performed; Future; Expected date: 03/30/2023    3  Screening for osteoporosis  -     DXA bone density spine hip and pelvis; Future; Expected date: 03/30/2023          Subjective:      Patient ID: Danny Villalobos is a 62 y o  female  Patient is seen for lumbar pain for the last several days  Symptoms are mild to moderate  No radiculopathy symptoms  She does have chronic bilateral hip pain at times  The following portions of the patient's history were reviewed and updated as appropriate: allergies, current medications, past family history, past medical history, past social history, past surgical history, and problem list     Review of Systems   Constitutional: Negative  HENT: Negative  Eyes: Negative  Respiratory: Negative  Cardiovascular: Negative  Gastrointestinal: Negative  Endocrine: Negative  Genitourinary: Negative  Musculoskeletal: Positive for arthralgias  Skin: Negative  Allergic/Immunologic: Negative  Neurological: Negative  Hematological: Negative  Psychiatric/Behavioral: Negative            Objective:      /70 (BP Location: Left arm, Patient Position: Sitting, Cuff Size: Standard)   Pulse (!) 106   Temp 98 °F (36 7 °C) (Tympanic)   Ht 4' 11\" (1 499 m)   Wt 64 5 kg (142 lb 3 2 oz)   " SpO2 99%   BMI 28 72 kg/m²          Physical Exam  Vitals reviewed  Constitutional:       Appearance: She is well-developed  HENT:      Head: Normocephalic and atraumatic  Right Ear: External ear normal  Tympanic membrane is not erythematous or bulging  Left Ear: External ear normal  Tympanic membrane is not erythematous or bulging  Nose: Nose normal       Mouth/Throat:      Mouth: No oral lesions  Pharynx: No oropharyngeal exudate  Eyes:      General: No scleral icterus  Right eye: No discharge  Left eye: No discharge  Conjunctiva/sclera: Conjunctivae normal    Neck:      Thyroid: No thyromegaly  Cardiovascular:      Rate and Rhythm: Normal rate and regular rhythm  Heart sounds: Normal heart sounds  No murmur heard  No friction rub  No gallop  Pulmonary:      Effort: Pulmonary effort is normal  No respiratory distress  Breath sounds: No wheezing or rales  Chest:      Chest wall: No tenderness  Abdominal:      General: Bowel sounds are normal  There is no distension  Palpations: Abdomen is soft  There is no mass  Tenderness: There is no abdominal tenderness  There is no guarding or rebound  Musculoskeletal:         General: No tenderness or deformity  Normal range of motion  Cervical back: Normal range of motion and neck supple  Lymphadenopathy:      Cervical: No cervical adenopathy  Skin:     General: Skin is warm and dry  Coloration: Skin is not pale  Findings: No erythema or rash  Neurological:      Mental Status: She is alert and oriented to person, place, and time  Cranial Nerves: No cranial nerve deficit  Motor: No abnormal muscle tone  Coordination: Coordination normal       Deep Tendon Reflexes: Reflexes are normal and symmetric     Psychiatric:         Behavior: Behavior normal

## 2023-03-31 ENCOUNTER — HOSPITAL ENCOUNTER (OUTPATIENT)
Dept: RADIOLOGY | Facility: HOSPITAL | Age: 58
Discharge: HOME | End: 2023-03-31

## 2023-03-31 DIAGNOSIS — M25.551 RIGHT HIP PAIN: ICD-10-CM

## 2023-03-31 DIAGNOSIS — M54.50 LUMBAR PAIN: ICD-10-CM

## 2023-06-02 ENCOUNTER — HOSPITAL ENCOUNTER (OUTPATIENT)
Dept: BONE DENSITY | Facility: MEDICAL CENTER | Age: 58
Discharge: HOME | End: 2023-06-02

## 2023-06-02 DIAGNOSIS — Z13.820 SCREENING FOR OSTEOPOROSIS: ICD-10-CM

## 2023-07-03 ENCOUNTER — TELEPHONE (OUTPATIENT)
Dept: FAMILY MEDICINE CLINIC | Facility: CLINIC | Age: 58
End: 2023-07-03

## 2023-07-03 NOTE — TELEPHONE ENCOUNTER
07/03/23 8:21 AM        The office's request has been received, reviewed, and the patient chart updated. The PCP has successfully been removed with a patient attribution note. This message will now be completed.         Thank you  Solange Boyce

## 2023-07-03 NOTE — TELEPHONE ENCOUNTER
Rcvd outside event notification. In Care Everywhere, looks as though pt sees a provider within [facility]. New PCP info:     Alessio Napier DO (May 10, 2023 - Present)  NPI: [id number]  [phone] (Work)  [phone] (Fax)  [address] [facility] [place], [address]  [facility], [address] [facility]  Internal Medicine  [address]    Please update chart

## 2023-07-30 DIAGNOSIS — E03.9 ACQUIRED HYPOTHYROIDISM: ICD-10-CM

## 2023-07-30 DIAGNOSIS — K29.00 ACUTE SUPERFICIAL GASTRITIS WITHOUT HEMORRHAGE: ICD-10-CM

## 2023-07-30 RX ORDER — FAMOTIDINE 20 MG/1
20 TABLET, FILM COATED ORAL 2 TIMES DAILY
Qty: 180 TABLET | Refills: 3 | Status: SHIPPED | OUTPATIENT
Start: 2023-07-30

## 2023-07-30 RX ORDER — LEVOTHYROXINE SODIUM 75 MCG
75 TABLET ORAL DAILY
Qty: 90 TABLET | Refills: 1 | Status: SHIPPED | OUTPATIENT
Start: 2023-07-30

## 2023-12-05 DIAGNOSIS — F41.1 GENERALIZED ANXIETY DISORDER: ICD-10-CM

## 2023-12-06 RX ORDER — LORAZEPAM 0.5 MG/1
TABLET ORAL
Qty: 90 TABLET | Refills: 0 | Status: SHIPPED | OUTPATIENT
Start: 2023-12-06

## 2024-01-29 ENCOUNTER — TELEPHONE (OUTPATIENT)
Dept: GASTROENTEROLOGY | Facility: CLINIC | Age: 59
End: 2024-01-29

## 2024-01-29 ENCOUNTER — OFFICE VISIT (OUTPATIENT)
Dept: GASTROENTEROLOGY | Facility: CLINIC | Age: 59
End: 2024-01-29
Payer: COMMERCIAL

## 2024-01-29 VITALS
TEMPERATURE: 97.9 F | DIASTOLIC BLOOD PRESSURE: 77 MMHG | HEIGHT: 59 IN | BODY MASS INDEX: 28.75 KG/M2 | HEART RATE: 93 BPM | OXYGEN SATURATION: 99 % | WEIGHT: 142.6 LBS | SYSTOLIC BLOOD PRESSURE: 117 MMHG

## 2024-01-29 DIAGNOSIS — R13.10 DYSPHAGIA, UNSPECIFIED TYPE: ICD-10-CM

## 2024-01-29 DIAGNOSIS — K21.9 GASTROESOPHAGEAL REFLUX DISEASE, UNSPECIFIED WHETHER ESOPHAGITIS PRESENT: Primary | ICD-10-CM

## 2024-01-29 DIAGNOSIS — Z80.0 FAMILY HISTORY OF COLON CANCER: ICD-10-CM

## 2024-01-29 DIAGNOSIS — K59.00 CONSTIPATION, UNSPECIFIED CONSTIPATION TYPE: ICD-10-CM

## 2024-01-29 DIAGNOSIS — Z83.79 FAMILY HISTORY OF INFLAMMATORY BOWEL DISEASE: ICD-10-CM

## 2024-01-29 DIAGNOSIS — Z86.010 HISTORY OF COLON POLYPS: ICD-10-CM

## 2024-01-29 PROCEDURE — 99214 OFFICE O/P EST MOD 30 MIN: CPT | Performed by: INTERNAL MEDICINE

## 2024-01-29 RX ORDER — ZOLPIDEM TARTRATE 10 MG/1
10 TABLET ORAL AS NEEDED
COMMUNITY
Start: 2024-01-24

## 2024-01-29 RX ORDER — FEXOFENADINE HCL 180 MG/1
180 TABLET ORAL DAILY
COMMUNITY
Start: 2024-01-01

## 2024-01-29 RX ORDER — FAMOTIDINE 40 MG/1
40 TABLET, FILM COATED ORAL 2 TIMES DAILY
Qty: 60 TABLET | Refills: 3 | Status: SHIPPED | OUTPATIENT
Start: 2024-01-29

## 2024-01-29 NOTE — PROGRESS NOTES
[facility] - Outpatient Follow-up Note  Divina Perales 58 y.o. female MRN: [id number]  Encounter: [id number]          ASSESSMENT AND PLAN:    Divina Perales is a 58 y.o. female with GERD, gastric polyps, irritable bowel syndrome mixed subtype with both constipation and diarrhea, nausea, family history of colon cancer in her father as well as a personal history of colon polyps, family history of inflammatory bowel disease including a sister with ulcerative colitis and a niece with Crohn's disease, who now presents for follow-up/to establish care.  Overall she has been doing well but notes that her reflux has been active.  Her constipation has been fairly well-managed with Colace. Colonoscopy performed July 2022 with diverticulosis in the left colon but otherwise normal. EGD performed March 2021 with gastric polyp, erythema in the duodenum, antrum.  Colonoscopy at that time with hemorrhoids, diverticulosis, erythema with erosion in the descending and sigmoid colon, single polyp removed.  Biopsies with chronic inactive gastritis negative for H. pylori, benign fundic gland polyp, nonspecific inflammation at the GE junction negative for intestinal metaplasia, descending colon with reactive changes and mild mucosal hemorrhage but no active or chronic colitis, sigmoid colon with the same, hyperplastic sigmoid colon polyp.    DEXA from June 2023 with osteoporosis.    Most recent CMP with normal electrolytes, creatinine, liver test.  Vitamin D at that time normal.  TSH normal.  CBC with normal white blood cell count, hemoglobin, MCV, platelets.    1. Gastroesophageal reflux disease, unspecified whether esophagitis present    2. Family history of colon cancer    3. History of colon polyps    4. Family history of inflammatory bowel disease    5. Dysphagia, unspecified type    6. Constipation, unspecified constipation type        Orders Placed This Encounter   Procedures    EGD    Colonoscopy        Continue Pepcid but increase the dose  Gaviscon as needed  Consider PPI in the future but osteoporosis noted.  We discussed the risks and benefits of potential PPI use.  Bentyl as needed  Continue Zofran as needed  Colace for constipation  Will order next colonoscopy now as it is due July 2024.  Will order repeat endoscopy as well to be done at the same time    ______________________________________________________________________    SUBJECTIVE:    Divina Perales is a 58 y.o. female who presents with complaint of GERD and constipation.     She saw an ENT. She is using pepcid twice daily and she is using Gaviscon a lot. She tries to avoid spicy food and sauces. She is using the gaviscon several times a week. She will get ear burning when she has reflux. It can wake her up from sleep. She did not like protonix.     She can go 3-5 days without a BM unless she takes colace daily. Sometimes alternating diarrhea and constipation. That has been better. She has not recently needed the Bentyl. She had a stomach virus In December and that was the last time she took the bentyl. She is having a BM everyday or every other day. No recent blood or black stools. Sometimes when she eats she might have some pressure. She has a hiatal hernia and sometimes she feels the pressure pushing up. It can be uncomfortable.   Taking pills can be difficult and she has to take something to help get it down. She will drink water and [place].   No nausea, vomiting recently but she had an episode last year.   No weight loss.     Sister with colitis. Father had colon cancer. Niece with Crohn's.     Answers submitted by the patient for this visit:  Abdominal Pain Questionnaire (Submitted on 1/27/2024)  Chief Complaint: Abdominal pain  Chronicity: chronic  Onset: more than 1 year ago  Onset quality: gradual  Frequency: intermittently  Episode duration: 4 Hours  Progression since onset: unchanged  Pain location: suprapubic region  Pain - numeric:  8/10  Pain quality: cramping, a sensation of fullness, sharp  Radiates to: suprapubic region  anorexia: No  constipation: Yes  diarrhea: Yes  flatus: Yes  nausea: Yes  vomiting: No  Aggravated by: eating  Relieved by: bowel movements, passing flatus  Diagnostic workup: GI consult, lower endoscopy, upper endoscopy      REVIEW OF SYSTEMS IS OTHERWISE NEGATIVE.  10 point ROS reviewed and negative, except as above      Historical Information   Past Medical History:   Diagnosis Date    Allergic     Seasonal    Anxiety     BBB (bundle branch block)     left- monitored by PCP-saw cardiologist in past, had workup-no problems 15 years ago    Clotting disorder (HCC)     Platelet disorder-idiopathic    Colon polyp     Depression     Disease of thyroid gland     Hypothyroidism    Fatty liver     Fibromyalgia, primary     Gastric ulcer     GERD (gastroesophageal reflux disease)     Hiatal hernia     Irritable bowel syndrome     Kidney stone     Palpitation     occasional    Sleep apnea     TMJ (dislocation of temporomandibular joint)     cannot keep mouth open for long periods of time, wears mouth guard at bedtime     Past Surgical History:   Procedure Laterality Date    ABDOMINAL SURGERY      APPENDECTOMY      CHOLECYSTECTOMY      COLONOSCOPY      EGD      GALLBLADDER SURGERY      HYSTERECTOMY  2010    NASAL SEPTUM SURGERY      OOPHORECTOMY  2010    UPPER GASTROINTESTINAL ENDOSCOPY       Social History   Social History     Substance and Sexual Activity   Alcohol Use Not Currently     Social History     Substance and Sexual Activity   Drug Use Never     Social History     Tobacco Use   Smoking Status Never   Smokeless Tobacco Never     Family History   Problem Relation Age of Onset    Heart attack Mother     Stroke Mother     Hypertension Mother     Hyperlipidemia Mother     Heart disease Mother     Colon cancer Father 70    Diabetes Father     Cancer Father         Colon    Colon polyps Father     Hyperlipidemia Father      "Hypertension Father     Hypothyroidism Father     Birth defects Sister         Spina bifida    Irritable bowel syndrome Sister     No Known Problems Daughter     No Known Problems Maternal Grandmother     Prostate cancer Maternal Grandfather         age dx unknown    Breast cancer Paternal Grandmother         age dx unknown    No Known Problems Paternal Grandfather     No Known Problems Maternal Aunt     No Known Problems Maternal Aunt     No Known Problems Paternal Aunt     COPD Sister     Hearing loss Sister     Crohn's disease Cousin         Actually my Niece, not a cousin    Ulcerative colitis Sister        Meds/Allergies       Current Outpatient Medications:     cyclobenzaprine (FLEXERIL) 10 mg tablet    dicyclomine (BENTYL) 10 mg capsule    docusate sodium (COLACE) 100 mg capsule    famotidine (PEPCID) 40 MG tablet    fexofenadine (Allegra Allergy) 180 MG tablet    LORazepam (ATIVAN) 0.5 mg tablet    ondansetron (ZOFRAN) 8 mg tablet    Probiotic Product (PROBIOTIC PO)    propranolol (INDERAL) 40 mg tablet    Synthroid 75 MCG tablet    venlafaxine (EFFEXOR-XR) 37.5 mg 24 hr capsule    zolpidem (AMBIEN) 10 mg tablet    cetirizine (ZyrTEC) 10 mg tablet    Allergies   Allergen Reactions    Aspirin Other (See Comments)     Reaction Date: 08Jun2011;   Patient has platelet disorder        Erythromycin Base Other (See Comments)     rash    Iodine - Food Allergy Other (See Comments) and Vomiting     ivp dye    Morphine Headache and Other (See Comments)     migraine headaches      Prochlorperazine Other (See Comments)     Lower half of body went numb    Quinolones Abdominal Pain     tendinitis    Metoclopramide Anxiety, Other (See Comments) and Rash     Reaction Date: 08Jun2011;       Tetracycline Rash and Other (See Comments)     Reaction Date: 08Jun2011;              Objective     Blood pressure 117/77, pulse 93, temperature 97.9 °F (36.6 °C), temperature source Tympanic, height 4' 11\" (1.499 m), weight 64.7 kg (142 " lb 9.6 oz), SpO2 99%, not currently breastfeeding. Body mass index is 28.8 kg/m².    PHYSICAL EXAMINATION:    General Appearance:   Alert, cooperative, no distress   HEENT:  Normocephalic, atraumatic, anicteric. Neck supple, symmetrical, trachea midline.   Lungs:   Equal chest rise and unlabored breathing, normal effort, no coughing.   Cardiovascular:   No visualized JVD.   Abdomen:   No abdominal distension.   Skin:   No jaundice, rashes, or lesions.    Musculoskeletal:   Normal range of motion visualized.   Psych:  Normal affect and normal insight.   Neuro:  Alert and appropriate.         Lab Results:   No visits with results within 1 Day(s) from this visit.   Latest known visit with results is:   Hospital Outpatient Visit on 03/02/2021   Component Date Value    Case Report 03/02/2021                      Value:Surgical Pathology Report                         Case: [id number]                                   Authorizing Provider:  Mark Anthony Ramirez MD           Collected:           03/02/2021 1131              Ordering Location:     [facility] [place] Received:            03/02/2021 2025                                     [place]                                                                  Pathologist:           Alexandre Umana MD                                                         Specimens:   A) - Stomach, cold bx antrum erythema ck for hpy                                                    B) - Polyp, Stomach/Small Intestine, cold snare gastric body polyp                                  C) - Esophagus, cold bx eg junction erythema                                                        D) - Large Intestine, Left/Descending Colon, cold bx descending erythema                            E) - Large Intestine, Sigmoid Colon, cold bx sigmoid erythema                                                                 F) - Large Intestine, Sigmoid Colon, cold snare distal sigmoid polyp                  "      Final Diagnosis 03/02/2021                      Value:This result contains rich text formatting which cannot be displayed here.    Additional Information 03/02/2021                      Value:This result contains rich text formatting which cannot be displayed here.    Gross Description 03/02/2021                      Value:This result contains rich text formatting which cannot be displayed here.       Lab Results   Component Value Date    WBC 4.8 04/03/2018    HGB 13.9 04/03/2018    HCT 41.3 04/03/2018    MCV 87 04/03/2018     04/03/2018       Lab Results   Component Value Date     04/03/2018    SODIUM 141 06/14/2023    K 4.6 06/14/2023     06/14/2023    CO2 30 06/14/2023    ANIONGAP 9 04/03/2018    AGAP 8 06/14/2023    BUN 17 06/14/2023    CREATININE 0.77 06/14/2023    GLUC 89 06/14/2023    GLUF 85 06/05/2020    CALCIUM 8.9 06/14/2023    AST 22 06/14/2023    ALT 24 06/14/2023    ALKPHOS 69 06/14/2023    PROT 6.6 04/03/2018    TP 6.6 06/14/2023    BILITOT 0.2 04/03/2018    TBILI 0.3 06/14/2023    EGFR 89 06/14/2023       No results found for: \"CRP\"    Lab Results   Component Value Date    MGX9QCIPUPSG 0.804 06/05/2020    TSH 1.30 06/14/2023       No results found for: \"IRON\", \"TIBC\", \"FERRITIN\"    Radiology Results:   No results found.  "

## 2024-01-29 NOTE — TELEPHONE ENCOUNTER
ASC Screening    ASC Screening  BMI > than 45: No  Are you currently pregnant?: No  Do you rely on a wheelchair for mobility?: No  Do you need oxygen during the day?: No  Have you ever been informed by anesthesia that you have a difficult airway?: No  Have you been diagnosed with End Stage Renal Disease (ESRD)?: No  Are you actively on dialysis?: No  Have you been diagnosed with Pulmonary Hypertension?: No  Do you have a pacemaker or an Automatic Implantable Cardioverter Defibrillator (AICD)?: No  Have you ever had an organ transplant?: No  Have you had a stroke, heart attack, myocardial infarction (MI) within the last 6 months?: No  Have you ever been diagnosed with Aortic Stenosis?: No  Have you ever been diagnosed  with Congestive Heart Failure?: No  Have you ever been diagnosed with a heart valve disease?: No  Are you Diabetic?: No  If you are Diabetic, has your A1C been greater than 12 within the last six months?: N/A         Procedure: co9lon/egd  Date: July 15  Physician performing: Dr. Eisenberg  Location of procedure:  [place]  Instructions given to patient: miralax  Diabetic: n/a  Clearances: n/a

## 2024-03-02 DIAGNOSIS — K21.9 GASTROESOPHAGEAL REFLUX DISEASE, UNSPECIFIED WHETHER ESOPHAGITIS PRESENT: ICD-10-CM

## 2024-03-04 RX ORDER — FAMOTIDINE 40 MG/1
40 TABLET, FILM COATED ORAL 2 TIMES DAILY
Qty: 180 TABLET | Refills: 1 | Status: SHIPPED | OUTPATIENT
Start: 2024-03-04

## 2024-07-01 ENCOUNTER — ANESTHESIA EVENT (OUTPATIENT)
Dept: ANESTHESIOLOGY | Facility: HOSPITAL | Age: 59
End: 2024-07-01

## 2024-07-01 ENCOUNTER — ANESTHESIA (OUTPATIENT)
Dept: ANESTHESIOLOGY | Facility: HOSPITAL | Age: 59
End: 2024-07-01

## 2024-07-05 ENCOUNTER — TELEPHONE (OUTPATIENT)
Dept: GASTROENTEROLOGY | Facility: CLINIC | Age: 59
End: 2024-07-05

## 2024-07-05 NOTE — TELEPHONE ENCOUNTER
Confirming Upcoming Procedure: Colon/egd on July 15th  Physician performing: Dr. Eisenberg  [place] of procedure[place]  [place]  Prep: [facility]

## 2024-07-15 ENCOUNTER — ANESTHESIA EVENT (OUTPATIENT)
Dept: GASTROENTEROLOGY | Facility: MEDICAL CENTER | Age: 59
End: 2024-07-15

## 2024-07-15 ENCOUNTER — ANESTHESIA (OUTPATIENT)
Dept: GASTROENTEROLOGY | Facility: MEDICAL CENTER | Age: 59
End: 2024-07-15

## 2024-07-15 ENCOUNTER — HOSPITAL ENCOUNTER (OUTPATIENT)
Dept: GASTROENTEROLOGY | Facility: MEDICAL CENTER | Age: 59
Setting detail: OUTPATIENT SURGERY
Discharge: HOME | End: 2024-07-15
Attending: INTERNAL MEDICINE
Payer: COMMERCIAL

## 2024-07-15 VITALS
RESPIRATION RATE: 18 BRPM | OXYGEN SATURATION: 97 % | DIASTOLIC BLOOD PRESSURE: 60 MMHG | SYSTOLIC BLOOD PRESSURE: 108 MMHG | HEART RATE: 77 BPM | TEMPERATURE: 97.4 F

## 2024-07-15 DIAGNOSIS — K21.9 GASTROESOPHAGEAL REFLUX DISEASE, UNSPECIFIED WHETHER ESOPHAGITIS PRESENT: ICD-10-CM

## 2024-07-15 DIAGNOSIS — Z83.79 FAMILY HISTORY OF INFLAMMATORY BOWEL DISEASE: ICD-10-CM

## 2024-07-15 DIAGNOSIS — Z80.0 FAMILY HISTORY OF COLON CANCER: ICD-10-CM

## 2024-07-15 DIAGNOSIS — Z86.010 HISTORY OF COLON POLYPS: ICD-10-CM

## 2024-07-15 PROCEDURE — G0105 COLORECTAL SCRN; HI RISK IND: HCPCS | Performed by: INTERNAL MEDICINE

## 2024-07-15 PROCEDURE — 88305 TISSUE EXAM BY PATHOLOGIST: CPT | Performed by: STUDENT IN AN ORGANIZED HEALTH CARE EDUCATION/TRAINING PROGRAM

## 2024-07-15 PROCEDURE — 43239 EGD BIOPSY SINGLE/MULTIPLE: CPT | Performed by: INTERNAL MEDICINE

## 2024-07-15 RX ORDER — LIDOCAINE HYDROCHLORIDE 20 MG/ML
INJECTION, SOLUTION EPIDURAL; INFILTRATION; INTRACAUDAL; PERINEURAL AS NEEDED
Status: DISCONTINUED | OUTPATIENT
Start: 2024-07-15 | End: 2024-07-15

## 2024-07-15 RX ORDER — SODIUM CHLORIDE 9 MG/ML
INJECTION, SOLUTION INTRAVENOUS CONTINUOUS PRN
Status: DISCONTINUED | OUTPATIENT
Start: 2024-07-15 | End: 2024-07-15

## 2024-07-15 RX ORDER — PROPOFOL 10 MG/ML
INJECTION, EMULSION INTRAVENOUS AS NEEDED
Status: DISCONTINUED | OUTPATIENT
Start: 2024-07-15 | End: 2024-07-15

## 2024-07-15 RX ORDER — PROPOFOL 10 MG/ML
INJECTION, EMULSION INTRAVENOUS CONTINUOUS PRN
Status: DISCONTINUED | OUTPATIENT
Start: 2024-07-15 | End: 2024-07-15

## 2024-07-15 RX ORDER — ONDANSETRON 2 MG/ML
INJECTION INTRAMUSCULAR; INTRAVENOUS AS NEEDED
Status: DISCONTINUED | OUTPATIENT
Start: 2024-07-15 | End: 2024-07-15

## 2024-07-15 RX ADMIN — PROPOFOL 50 MG: 10 INJECTION, EMULSION INTRAVENOUS at 08:56

## 2024-07-15 RX ADMIN — PROPOFOL 100 MG: 10 INJECTION, EMULSION INTRAVENOUS at 08:53

## 2024-07-15 RX ADMIN — SODIUM CHLORIDE 1000 ML: 0.9 INJECTION, SOLUTION INTRAVENOUS at 08:49

## 2024-07-15 RX ADMIN — ONDANSETRON 4 MG: 2 INJECTION INTRAMUSCULAR; INTRAVENOUS at 08:53

## 2024-07-15 RX ADMIN — PROPOFOL 130 MCG/KG/MIN: 10 INJECTION, EMULSION INTRAVENOUS at 09:02

## 2024-07-15 RX ADMIN — LIDOCAINE HYDROCHLORIDE 80 MG: 20 INJECTION, SOLUTION EPIDURAL; INFILTRATION; INTRACAUDAL at 08:53

## 2024-07-15 RX ADMIN — SODIUM CHLORIDE: 0.9 INJECTION, SOLUTION INTRAVENOUS at 08:50

## 2024-07-15 NOTE — H&P
History and Physical -  [facility]  Divina Perales 59 y.o. female MRN: [id number]                  HPI: Divina Perales is a 59 y.o. year old female who presents for gastric polyps, colon polyps      REVIEW OF SYSTEMS: Per the HPI, and otherwise unremarkable.    Historical Information   Past Medical History:   Diagnosis Date    Allergic     Seasonal    Anxiety     BBB (bundle branch block)     left- monitored by PCP-saw cardiologist in past, had workup-no problems 15 years ago    Clotting disorder (HCC)     Platelet disorder-idiopathic    Colon polyp     Depression     Disease of thyroid gland     Hypothyroidism    Fatty liver     Fibromyalgia, primary     Gastric ulcer     GERD (gastroesophageal reflux disease)     Hiatal hernia     Irritable bowel syndrome     Kidney stone     Palpitation     occasional    Sleep apnea     TMJ (dislocation of temporomandibular joint)     cannot keep mouth open for long periods of time, wears mouth guard at bedtime     Past Surgical History:   Procedure Laterality Date    ABDOMINAL SURGERY      APPENDECTOMY      CHOLECYSTECTOMY      COLONOSCOPY      EGD      GALLBLADDER SURGERY      HYSTERECTOMY  2010    NASAL SEPTUM SURGERY      OOPHORECTOMY  2010    UPPER GASTROINTESTINAL ENDOSCOPY       Social History   Social History     Substance and Sexual Activity   Alcohol Use Not Currently     Social History     Substance and Sexual Activity   Drug Use Never     Social History     Tobacco Use   Smoking Status Never   Smokeless Tobacco Never     Family History   Problem Relation Age of Onset    Heart attack Mother     Stroke Mother     Hypertension Mother     Hyperlipidemia Mother     Heart disease Mother     Colon cancer Father 70    Diabetes Father     Cancer Father         Colon    Colon polyps Father     Hyperlipidemia Father     Hypertension Father     Hypothyroidism Father     Birth defects Sister         Spina bifida    Irritable bowel syndrome Sister     No Known  Problems Daughter     No Known Problems Maternal Grandmother     Prostate cancer Maternal Grandfather         age dx unknown    Breast cancer Paternal Grandmother         age dx unknown    No Known Problems Paternal Grandfather     No Known Problems Maternal Aunt     No Known Problems Maternal Aunt     No Known Problems Paternal Aunt     COPD Sister     Hearing loss Sister     Crohn's disease Cousin         Actually my Niece, not a cousin    Ulcerative colitis Sister        Meds/Allergies     Not in a hospital admission.    Allergies   Allergen Reactions    Aspirin Other (See Comments)     Reaction Date: 08Jun2011;   Patient has platelet disorder        Erythromycin Base Other (See Comments)     rash    Iodine - Food Allergy Other (See Comments) and Vomiting     ivp dye    Morphine Headache and Other (See Comments)     migraine headaches      Prochlorperazine Other (See Comments)     Lower half of body went numb    Quinolones Abdominal Pain     tendinitis    Metoclopramide Anxiety, Other (See Comments) and Rash     Reaction Date: 08Jun2011;       Tetracycline Rash and Other (See Comments)     Reaction Date: 08Jun2011;          Objective     not currently breastfeeding.      PHYSICAL EXAMINATION:    General Appearance:   Alert, cooperative, no distress   HEENT:  Normocephalic, atraumatic, anicteric. Neck supple, symmetrical, trachea midline.   Lungs:   Equal chest rise and unlabored breathing, normal effort, no coughing.   Cardiovascular:   No visualized JVD.   Abdomen:   No abdominal distension.   Skin:   No jaundice, rashes, or lesions.    Musculoskeletal:   Normal range of motion visualized.   Psych:  Normal affect and normal insight.   Neuro:  Alert and appropriate.           ASSESSMENT/PLAN:  This is a 59 y.o. year old female here for EGD and colonoscopy, and she is stable and optimized for her procedure.

## 2024-07-15 NOTE — DISCHARGE INSTRUCTIONS
Upper Endoscopy and Colonoscopy   WHAT YOU NEED TO KNOW:   An upper endoscopy is also called an upper gastrointestinal (GI) endoscopy, or an esophagogastroduodenoscopy (EGD). It is a procedure to examine the inside of your esophagus, stomach, and duodenum (first part of the small intestine) with a scope. You may feel bloated, gassy, or have some abdominal discomfort after your procedure. Your throat may be sore for 24 to 36 hours. You may burp or pass gas from air that is still inside your body.                A colonoscopy is a procedure to examine the inside of your colon (intestine) with a scope. Polyps or tissue growths may have been removed during your colonoscopy. It is normal to feel bloated and to have some abdominal discomfort. You should be passing gas. If you have hemorrhoids or you had polyps removed, you may have a small amount of bleeding.          DISCHARGE INSTRUCTIONS:   Seek care immediately if:   You have sudden, severe abdominal pain.     You have problems swallowing.     You have a large amount of black, sticky bowel movements or blood in your bowel movements.     You have sudden trouble breathing.     You feel weak, lightheaded, or faint or your heart beats faster than normal for you.     Contact your healthcare provider if:   You have a fever and chills.      You have nausea or are vomiting.      Your abdomen is bloated or feels full and hard.     You have abdominal pain.    You have a large amount of black, sticky bowel movements or blood in your bowel movements.    You have not had a bowel movement for 3 days after your procedure.    You have rash or hives.    You have questions or concerns about your procedure.     Activity:   ·       Do not lift, strain, or run for 24 hours after your procedure.     ·       Rest after your procedure. You have been given medicine to relax you. Do not drive or make important decisions until the day after your procedure. Return to your normal activity as  directed.     ·       Relieve gas and discomfort from bloating by lying on your right side with a heating pad on your abdomen. You may need to take short walks to help the gas move out. Eat small meals until bloating is relieved.  Follow up with your healthcare provider as directed: Write down your questions so you remember to ask them during your visits.      If you take a “blood thinner”, please review the specific instructions from your endoscopist about when you should resume it. These can be found in the “Recommendation” and “Your Medication list” sections of this After Visit Summary.

## 2024-07-15 NOTE — ANESTHESIA PREPROCEDURE EVALUATION
Procedure:  EGD  COLONOSCOPY    Relevant Problems   ENDO   (+) Acquired hypothyroidism      GI/HEPATIC   (+) Gastroesophageal reflux disease without esophagitis      NEURO/PSYCH   (+) Generalized anxiety disorder      PULMONARY   (+) AYM (obstructive sleep apnea)   (+) Obstructive sleep apnea syndrome        Physical Exam    Airway    Mallampati score: III  TM Distance: >3 FB  Neck ROM: full     Dental   No notable dental hx     Cardiovascular  Cardiovascular exam normal    Pulmonary  Pulmonary exam normal     Other Findings  post-pubertal.      Anesthesia Plan  ASA Score- 2     Anesthesia Type- IV sedation with anesthesia with ASA Monitors.         Additional Monitors:     Airway Plan:            Plan Factors-Exercise tolerance (METS): >4 METS.    Chart reviewed.    Patient summary reviewed.                  Induction- intravenous.    Postoperative Plan-         Informed Consent- Anesthetic plan and risks discussed with patient.

## 2024-07-15 NOTE — ANESTHESIA POSTPROCEDURE EVALUATION
Post-Op Assessment Note    CV Status:  Stable    Pain management: adequate       Mental Status:  Sleepy and arousable   Hydration Status:  Euvolemic   PONV Controlled:  Controlled   Airway Patency:  Patent     Post Op Vitals Reviewed: Yes    No anethesia notable event occurred.    Staff: CRNA               BP 91/52 (07/15/24 0921)    Temp   97   Pulse 70 (07/15/24 0921)   Resp 18 (07/15/24 0921)    SpO2 100 % (07/15/24 0921)

## 2024-07-29 ENCOUNTER — OFFICE VISIT (OUTPATIENT)
Dept: GASTROENTEROLOGY | Facility: CLINIC | Age: 59
End: 2024-07-29
Payer: COMMERCIAL

## 2024-07-29 ENCOUNTER — TELEPHONE (OUTPATIENT)
Age: 59
End: 2024-07-29

## 2024-07-29 VITALS
HEIGHT: 59 IN | TEMPERATURE: 97 F | HEART RATE: 96 BPM | WEIGHT: 135.6 LBS | BODY MASS INDEX: 27.34 KG/M2 | OXYGEN SATURATION: 98 % | SYSTOLIC BLOOD PRESSURE: 120 MMHG | DIASTOLIC BLOOD PRESSURE: 80 MMHG

## 2024-07-29 DIAGNOSIS — K21.9 GASTROESOPHAGEAL REFLUX DISEASE, UNSPECIFIED WHETHER ESOPHAGITIS PRESENT: Primary | ICD-10-CM

## 2024-07-29 DIAGNOSIS — K59.00 CONSTIPATION, UNSPECIFIED CONSTIPATION TYPE: ICD-10-CM

## 2024-07-29 DIAGNOSIS — Z83.79 FAMILY HISTORY OF INFLAMMATORY BOWEL DISEASE: ICD-10-CM

## 2024-07-29 DIAGNOSIS — Z80.0 FAMILY HISTORY OF COLON CANCER: ICD-10-CM

## 2024-07-29 DIAGNOSIS — R13.10 DYSPHAGIA, UNSPECIFIED TYPE: ICD-10-CM

## 2024-07-29 PROCEDURE — 99214 OFFICE O/P EST MOD 30 MIN: CPT | Performed by: INTERNAL MEDICINE

## 2024-07-29 RX ORDER — FAMOTIDINE 40 MG/1
40 TABLET, FILM COATED ORAL 2 TIMES DAILY
Qty: 180 TABLET | Refills: 1 | Status: SHIPPED | OUTPATIENT
Start: 2024-07-29

## 2024-07-29 NOTE — PROGRESS NOTES
[facility] - Outpatient Follow-up Note  Divina Perales 59 y.o. female MRN: [id number]  Encounter: [id number]          ASSESSMENT AND PLAN:    Divina Perales is a 59 y.o. female with GERD, gastric polyps, irritable bowel syndrome with mixed subtype with both diarrhea and constipation, nausea, family history of colon cancer in her father and personal history of colon polyps, family history of IBD with his sister has ulcerative colitis and a niece with Crohn's, who now presents for follow-up.  She has been having dysphagia to pills, reflux, LPR and constipation.     EGD and colonoscopy from July 2024 notable for some erythema with erosion in the antrum, normal-appearing colon aside from some small pancolonic diverticula.  Biopsies with benign duodenum, benign stomach biopsies, mild reactive changes in the esophagus, some reactive tissue in the terminal ileum and benign colon biopsies.    Echocardiogram with EF of 61 to 65%.  Resting EKG noted to be normal sinus rhythm.    DEXA scan from June 2023 with osteoporosis.    Most recent CMP normal.  TSH normal.  Vitamin D normal.  CBC normal.    1. Gastroesophageal reflux disease, unspecified whether esophagitis present    2. Dysphagia, unspecified type    3. Family history of colon cancer    4. Family history of inflammatory bowel disease    5. Constipation, unspecified constipation type        Orders Placed This Encounter   Procedures    Esoph manometry/24hr ph     Continue Colace daily to help with constipation  Can use MiraLAX as needed  Bentyl as needed for abdominal pain  High-fiber diet  Drink at least 8 cups of water per day    Continue Pepcid 40 mg twice daily to help with GERD  pH and manometry off H2 blocker and PPI  Consider trial of omeprazole in the future  Gaviscon or Tums as needed for breakthrough symptoms  Zofran as needed for nausea  Avoid NSAIDs    Colonoscopy July  2029      ______________________________________________________________________    SUBJECTIVE:    Divina Perales is a 59 y.o. female who presents with complaint of reflux.     When she takes pills she has to push them down. It feels like it is sitting in her esophagus. When she swallows it is hard for things to pass. When she refluxes she feels the burning in her ear.   Also more constipated since the colonosocpy.       Answers submitted by the patient for this visit:  Abdominal Pain Questionnaire (Submitted on 7/22/2024)  Chief Complaint: Abdominal pain  Chronicity: chronic  Onset: more than 1 year ago  Onset quality: gradual  Frequency: intermittently  Episode duration: 3 Hours  Progression since onset: unchanged  Pain location: suprapubic region  Pain - numeric: 8/10  Pain quality: cramping, sharp  Radiates to: suprapubic region  anorexia: No  arthralgias: Yes  belching: No  constipation: Yes  diarrhea: No  dysuria: No  fever: No  flatus: No  frequency: No  headaches: No  hematochezia: No  hematuria: No  melena: No  myalgias: Yes  nausea: No  weight loss: No  vomiting: No  Aggravated by: eating  Relieved by: bowel movements, passing flatus  Diagnostic workup: lower endoscopy, upper endoscopy      REVIEW OF SYSTEMS IS OTHERWISE NEGATIVE.  10 point ROS reviewed and negative, except as above      Historical Information   Past Medical History:   Diagnosis Date    Allergic     Seasonal    Anxiety     BBB (bundle branch block)     left- monitored by PCP-saw cardiologist in past, had workup-no problems 15 years ago    Clotting disorder (HCC)     Platelet disorder-idiopathic    Colon polyp     Depression     Disease of thyroid gland     Hypothyroidism    Fatty liver     Fibromyalgia, primary     Gastric ulcer     GERD (gastroesophageal reflux disease)     Hiatal hernia     Irritable bowel syndrome     Kidney stone     Palpitation     occasional    Sleep apnea     TMJ (dislocation of temporomandibular joint)     cannot  keep mouth open for long periods of time, wears mouth guard at bedtime     Past Surgical History:   Procedure Laterality Date    ABDOMINAL SURGERY      APPENDECTOMY      CHOLECYSTECTOMY      COLONOSCOPY      EGD      GALLBLADDER SURGERY      HYSTERECTOMY  2010    NASAL SEPTUM SURGERY      OOPHORECTOMY  2010    UPPER GASTROINTESTINAL ENDOSCOPY       Social History   Social History     Substance and Sexual Activity   Alcohol Use Not Currently     Social History     Substance and Sexual Activity   Drug Use Never     Social History     Tobacco Use   Smoking Status Never   Smokeless Tobacco Never     Family History   Problem Relation Age of Onset    Heart attack Mother     Stroke Mother     Hypertension Mother     Hyperlipidemia Mother     Heart disease Mother     Colon cancer Father 70    Diabetes Father     Cancer Father         Colon    Colon polyps Father     Hyperlipidemia Father     Hypertension Father     Hypothyroidism Father     Birth defects Sister         Spina bifida    Irritable bowel syndrome Sister     No Known Problems Daughter     No Known Problems Maternal Grandmother     Prostate cancer Maternal Grandfather         age dx unknown    Breast cancer Paternal Grandmother         age dx unknown    No Known Problems Paternal Grandfather     No Known Problems Maternal Aunt     No Known Problems Maternal Aunt     No Known Problems Paternal Aunt     COPD Sister     Hearing loss Sister     Crohn's disease Cousin         Actually my Niece, not a cousin    Ulcerative colitis Sister        Meds/Allergies       Current Outpatient Medications:     cyclobenzaprine (FLEXERIL) 10 mg tablet    dicyclomine (BENTYL) 10 mg capsule    docusate sodium (COLACE) 100 mg capsule    famotidine (PEPCID) 40 MG tablet    fexofenadine (Allegra Allergy) 180 MG tablet    LORazepam (ATIVAN) 0.5 mg tablet    ondansetron (ZOFRAN) 8 mg tablet    Probiotic Product (PROBIOTIC PO)    propranolol (INDERAL) 40 mg tablet    Synthroid 75 MCG  "tablet    venlafaxine (EFFEXOR-XR) 37.5 mg 24 hr capsule    zolpidem (AMBIEN) 10 mg tablet    Allergies   Allergen Reactions    Aspirin Other (See Comments)     Reaction Date: 08Jun2011;   Patient has platelet disorder        Erythromycin Base Other (See Comments)     rash    Iodine - Food Allergy Other (See Comments) and Vomiting     ivp dye    Morphine Headache and Other (See Comments)     migraine headaches      Prochlorperazine Other (See Comments)     Lower half of body went numb    Quinolones Abdominal Pain     tendinitis    Metoclopramide Anxiety, Other (See Comments) and Rash     Reaction Date: 08Jun2011;       Tetracycline Rash and Other (See Comments)     Reaction Date: 08Jun2011;              Objective     Blood pressure 120/80, pulse 96, temperature (!) 97 °F (36.1 °C), temperature source Tympanic, height 4' 11\" (1.499 m), weight 61.5 kg (135 lb 9.6 oz), SpO2 98%, not currently breastfeeding. Body mass index is 27.39 kg/m².    PHYSICAL EXAMINATION:    General Appearance:   Alert, cooperative, no distress   HEENT:  Normocephalic, atraumatic, anicteric. Neck supple, symmetrical, trachea midline.   Lungs:   Equal chest rise and unlabored breathing, normal effort, no coughing.   Cardiovascular:   No visualized JVD.   Abdomen:   No abdominal distension.   Skin:   No jaundice, rashes, or lesions.    Musculoskeletal:   Normal range of motion visualized.   Psych:  Normal affect and normal insight.   Neuro:  Alert and appropriate.         Lab Results:   No visits with results within 1 Day(s) from this visit.   Latest known visit with results is:   Hospital Outpatient Visit on 07/15/2024   Component Date Value    Case Report 07/15/2024                      Value:Surgical Pathology Report                         Case: [id number]                                  Authorizing Provider:  Javon Eisenberg MD    Collected:           07/15/2024 0855              Ordering Location:     [facility]" Received:            07/15/2024 [address] [place] [address] Endoscopy                                                     Pathologist:           Elier Real MD                                                          Specimens:   A) - Duodenum, Duodenum bx-cold                                                                     B) - Stomach, Gastric bx-cold                                                                       C) - Esophagus, Distal esophagus bx-cold                                                            D) - Esophagus, Proximal esophagus bx-cold                                                          E) - Terminal Ileum, Terminal Ileum bx-cold R/O enteritis                                                                     F) - Colon, Random colon bx-cold                                                           Final Diagnosis 07/15/2024                      Value:A. Duodenum, biopsy:  -   Duodenal mucosa with normal villous architecture and no significant histopathologic change.  -   No evidence of celiac disease.     B. Stomach, biopsy:  -   Gastric oxyntic mucosa no significant histopathologic change.  -   Gastric antral mucosa with reactive gastropathy.   -   Negative for intestinal metaplasia and dysplasia.  -   Negative for Helicobacter pylori-type organisms on H&E stain.     C. Esophagus, Distal, biopsy:  -   Squamous mucosa with mild reactive changes.  -   No evidence of eosinophilic esophagitis.    D. Esophagus, Proximal, biopsy:  -   Squamous mucosa with mild reactive changes.  -   No evidence of eosinophilic esophagitis.     E. Terminal Ileum, biopsy:  -   Small intestinal mucosa with expanded lymphoid tissue, favor reactive.   -   No evidence of ileitis.     F. Colon, Random, biopsy:  -   Colonic mucosa with no significant histopathologic change.  -   No evidence of microscopic colitis.      Additional Information 07/15/2024                       "Value:All reported additional testing was performed with appropriately reactive controls.  These tests were developed and their performance characteristics determined by [facility] or appropriate performing facility, though some tests may be performed on tissues which have not been validated for performance characteristics (such as staining performed on alcohol exposed cell blocks and decalcified tissues).  Results should be interpreted with caution and in the context of the patients’ clinical condition. These tests may not be cleared or approved by the U.S. Food and Drug Administration, though the FDA has determined that such clearance or approval is not necessary. These tests are used for clinical purposes and they should not be regarded as investigational or for research. This laboratory has been approved by [facility], designated as a high-complexity laboratory and is qualified to perform these tests.  .Interpretation performed at [facility] [address] [place] [address] [place] [address]        Gross Description 07/15/2024                      Value:A. The specimen is received in formalin, labeled with the patient's name and hospital number, and is designated \" duodenum biopsy-cold\".  The specimen is drained into an embedding bag and consists of multiple tan soft tissue fragments measuring in aggregate of 1.5 x 0.3 x 0.1 cm.  Entirely submitted. Screened cassette.  B. The specimen is received in formalin, labeled with the patient's name and hospital number, and is designated \" gastric biopsy-cold\".  The specimen consists of multiple tan-brown soft tissue fragments measuring in aggregate of 0.9 x 0.3 x 0.1 cm.  Entirely submitted. Screened cassette.  C. The specimen is received in formalin, labeled with the patient's name and hospital number, and is designated \" distal esophagus biopsy-cold\".  The specimen consists of 2 white-tan soft tissue fragments measuring 0.3 cm " "each in greatest dimension.  Entirely submitted. Screened cassette.  D. The specimen is received in formalin, labeled with the patient's name and hospital number, and                           is designated \" proximal esophagus biopsy-cold\".  The specimen consists of 2 white-tan soft tissue fragments measuring 0.3 and 0.5 cm in greatest dimension.  Entirely submitted. Screened cassette.  E. The specimen is received in formalin, labeled with the patient's name and hospital number, and is designated \" terminal ileum biopsy-rule out enteritis\".  The specimen consists of 2 tan soft tissue fragments measuring 0.4 cm each in greatest dimension.  Entirely submitted. Screened cassette.  F. The specimen is received in formalin, labeled with the patient's name and hospital number, and is designated \" random colon biopsy-cold\".  The specimen consists of 2 tan soft tissue fragments measuring in aggregate of 1.0 x 0.4 x 0.2 cm.  Entirely submitted. Screened cassette.    Note: The estimated total formalin fixation time based upon information provided by the submitting clinician and the standard processing schedule is under 72 hours.    -KEmeigh         Lab Results   Component Value Date    WBC 4.8 04/03/2018    HGB 13.9 04/03/2018    HCT 41.3 04/03/2018    MCV 87 04/03/2018     04/03/2018       Lab Results   Component Value Date     04/03/2018    SODIUM 140 06/13/2024    K 4.8 06/13/2024     06/13/2024    CO2 28 06/13/2024    ANIONGAP 9 04/03/2018    AGAP 8 06/13/2024    BUN 15 06/13/2024    CREATININE 0.82 06/13/2024    GLUC 87 06/13/2024    GLUF 85 06/05/2020    CALCIUM 9.0 06/13/2024    AST 19 06/13/2024    ALT 16 06/13/2024    ALKPHOS 64 06/13/2024    PROT 6.6 04/03/2018    TP 6.5 06/13/2024    BILITOT 0.2 04/03/2018    TBILI 0.2 06/13/2024    EGFR 82 06/13/2024       No results found for: \"CRP\"    Lab Results   Component Value Date    BRX2WGQJXNLB 0.804 06/05/2020    TSH 1.64 06/13/2024       No results " "found for: \"IRON\", \"TIBC\", \"FERRITIN\"    Radiology Results:   Colonoscopy    Addendum Date: 7/22/2024 Addendum:   [facility] [address] [place] [address] [phone] DATE OF SERVICE: 7/15/24 PHYSICIAN(S): Attending: Javon Eisenberg MD Fellow: No Staff Documented INDICATION: Family history of colon cancer, Family history of inflammatory bowel disease, History of colon polyps POST-OP DIAGNOSIS: See the impression below. HISTORY: Prior colonoscopy: 5 years ago. BOWEL PREPARATION:  PREPROCEDURE: Informed consent was obtained for the procedure, including sedation. Risks including but not limited to bleeding, infection, perforation, adverse drug reaction and aspiration were explained in detail. Also explained about less than 100% sensitivity with the exam and other alternatives. The patient was placed in the left lateral decubitus position. Procedure: Colonoscopy DETAILS OF PROCEDURE: Patient was taken to the procedure room where a time out was performed to confirm correct patient and correct procedure. The patient underwent monitored anesthesia care, which was administered by an anesthesia professional. The patient's blood pressure, ECG, ETCO2, heart rate, level of consciousness, oxygen and respirations were monitored throughout the procedure. A digital rectal exam was performed. A perianal exam was performed. The scope was introduced through the anus and advanced to the terminal ileum. Retroflexion was performed in the rectum. The quality of bowel preparation was evaluated using the [place] Bowel Preparation Scale with scores of: right colon = 2, transverse colon = 2, left colon = 2. The total BBPS score was 6. Bowel prep was adequate. The patient experienced no blood loss. The procedure was not difficult. The patient tolerated the procedure well. There were no apparent adverse events. ANESTHESIA INFORMATION: ASA: II Anesthesia Type: IV Sedation with Anesthesia MEDICATIONS: " No administrations occurring from 0850 to 0918 on 07/15/24 FINDINGS: All observed locations appeared normal. Performed random biopsy using biopsy forceps. Multiple small pancolonic diverticula EVENTS: Procedure Events Event Event Time ENDO CECUM REACHED 7/15/2024  9:07 AM ENDO SCOPE OUT TIME 7/15/2024  9:18 AM SPECIMENS: ID Type Source Tests Collected by Time Destination 1 : Duodenum bx-cold Tissue Duodenum TISSUE EXAM Javon Eisenberg MD 7/15/2024  8:55 AM  2 : Gastric bx-cold Tissue Stomach TISSUE EXAM Javon Eisenberg MD 7/15/2024  8:56 AM  3 : Distal esophagus bx-cold Tissue Esophagus TISSUE EXAM Javon Eisenberg MD 7/15/2024  8:58 AM  4 : Proximal esophagus bx-cold Tissue Esophagus TISSUE EXAM Javon Eisenberg MD 7/15/2024  8:59 AM  5 : Terminal Ileum bx-cold R/O enteritis Tissue Terminal Ileum TISSUE EXAM Javon Eisenberg MD 7/15/2024  9:08 AM  6 : Random colon bx-cold Tissue Colon TISSUE EXAM Javon Eisenberg MD 7/15/2024  9:11 AM  EQUIPMENT: Colonoscope -CF  ENDOCUFF VISION LRG GREEN ID 11.2 IMPRESSION: Normal. Performed random biopsy using biopsy forceps. Diverticulosis RECOMMENDATION: Repeat colonoscopy in 5 years, due: 7/14/2029 Family history of colon cancer Family history of colon polyps  High fiber diet    Javon Eisenberg MD     Result Date: 7/22/2024  Narrative: Table formatting from the original result was not included. [facility] [address] [place] [address] [phone] DATE OF SERVICE: 7/15/24 PHYSICIAN(S): Attending: Javon Eisenberg MD Fellow: No Staff Documented INDICATION: Family history of colon cancer, Family history of inflammatory bowel disease, History of colon polyps POST-OP DIAGNOSIS: See the impression below. HISTORY: Prior colonoscopy: 5 years ago. BOWEL PREPARATION: Miralax/Dulcolax PREPROCEDURE: Informed consent was obtained for the procedure, including sedation. Risks including but not limited to  bleeding, infection, perforation, adverse drug reaction and aspiration were explained in detail. Also explained about less than 100% sensitivity with the exam and other alternatives. The patient was placed in the left lateral decubitus position. Procedure: Colonoscopy DETAILS OF PROCEDURE: Patient was taken to the procedure room where a time out was performed to confirm correct patient and correct procedure. The patient underwent monitored anesthesia care, which was administered by an anesthesia professional. The patient's blood pressure, ECG, ETCO2, heart rate, level of consciousness, oxygen and respirations were monitored throughout the procedure. A digital rectal exam was performed. The scope was introduced through the anus and advanced to the cecum. The quality of bowel preparation was evaluated using the [place] Bowel Preparation Scale with scores of: right colon = 2, transverse colon = 2, left colon = 2. The total BBPS score was 6. Bowel prep was adequate. The patient experienced no blood loss. The procedure was not difficult. The patient tolerated the procedure well. There were no apparent adverse events. ANESTHESIA INFORMATION: ASA: II Anesthesia Type: IV Sedation with Anesthesia MEDICATIONS: No administrations occurring from 0850 to 0918 on 07/15/24 FINDINGS: Multiple small pancolonic diverticula EVENTS: Procedure Events Event Event Time ENDO CECUM REACHED 7/15/2024  9:07 AM ENDO SCOPE OUT TIME 7/15/2024  9:18 AM SPECIMENS: ID Type Source Tests Collected by Time Destination 1 : Duodenum bx-cold Tissue Duodenum TISSUE EXAM Javon Eisenberg MD 7/15/2024  8:55 AM  2 : Gastric bx-cold Tissue Stomach TISSUE EXAM Javon Eisenberg MD 7/15/2024  8:56 AM  3 : Distal esophagus bx-cold Tissue Esophagus TISSUE EXAM Javon Eisenberg MD 7/15/2024  8:58 AM  4 : Proximal esophagus bx-cold Tissue Esophagus TISSUE EXAM Javon Eisenberg MD 7/15/2024  8:59 AM  5 : Terminal Ileum bx-cold R/O enteritis Tissue  Terminal Ileum TISSUE EXAM Javon Eisenberg MD 7/15/2024  9:08 AM  6 : Random colon bx-cold Tissue Colon TISSUE EXAM Javon Eisenberg MD 7/15/2024  9:11 AM  EQUIPMENT: Scope not documented     Impression: Diverticulosis RECOMMENDATION: Repeat colonoscopy in 5 years, due: 7/14/2029 Family history of colon cancer Family history of colon polyps  High fiber diet    Javon Eisenberg MD     EGD    Result Date: 7/15/2024  Narrative: Table formatting from the original result was not included. [facility] [address] [address] [phone] DATE OF SERVICE: 7/15/24 PHYSICIAN(S): Attending: Javon Eisenberg MD Fellow: No Staff Documented INDICATION: Gastroesophageal reflux disease, unspecified whether esophagitis present POST-OP DIAGNOSIS: See the impression below. PREPROCEDURE: Informed consent was obtained for the procedure, including sedation.  Risks of perforation, hemorrhage, adverse drug reaction and aspiration were discussed. The patient was placed in the left lateral decubitus position. Patient was explained about the risks and benefits of the procedure. Risks including but not limited to bleeding, infection, and perforation were explained in detail. Also explained about less than 100% sensitivity with the exam and other alternatives. PROCEDURE: EGD DETAILS OF PROCEDURE: Patient was taken to the procedure room where a time out was performed to confirm correct patient and correct procedure. The patient underwent monitored anesthesia care, which was administered by an anesthesia professional. The patient's blood pressure, heart rate, level of consciousness, respirations, oxygen, ECG and ETCO2 were monitored throughout the procedure. The scope was introduced through the mouth and advanced to the second part of the duodenum. Retroflexion was performed in the fundus. The patient experienced no blood loss. The procedure was not difficult. The patient tolerated the  procedure well. There were no apparent adverse events. ANESTHESIA INFORMATION: ASA: II Anesthesia Type: IV Sedation with Anesthesia MEDICATIONS: No administrations occurring from 0850 to 0859 on 07/15/24 FINDINGS: The esophagus appeared normal. Performed random biopsy using biopsy forceps to rule out eosinophilic esophagitis. Z-line 35 cm from the incisors Erythematous mucosa with erosion in the antrum; performed cold forceps biopsy to rule out H. pylori The duodenum appeared normal. Performed random biopsy using biopsy forceps to rule out celiac disease. SPECIMENS: ID Type Source Tests Collected by Time Destination 1 : Duodenum bx-cold Tissue Duodenum TISSUE EXAM Javon Eisenberg MD 7/15/2024  8:55 AM  2 : Gastric bx-cold Tissue Stomach TISSUE EXAM Javon Eisenberg MD 7/15/2024  8:56 AM  3 : Distal esophagus bx-cold Tissue Esophagus TISSUE EXAM Javon Eisenberg MD 7/15/2024  8:58 AM  4 : Proximal esophagus bx-cold Tissue Esophagus TISSUE EXAM Javon Eisenberg MD 7/15/2024  8:59 AM      Impression: The esophagus appeared normal. Performed random biopsy to rule out eosinophilic esophagitis. Erythematous mucosa with erosion in the antrum; performed cold forceps biopsy The duodenum appeared normal. Performed random biopsy to rule out celiac disease. RECOMMENDATION: Await pathology results Proceed with colonoscopy   Javon Eisenberg MD

## 2024-08-08 ENCOUNTER — TELEPHONE (OUTPATIENT)
Dept: GASTROENTEROLOGY | Facility: HOSPITAL | Age: 59
End: 2024-08-08

## 2024-08-09 ENCOUNTER — TELEPHONE (OUTPATIENT)
Dept: GASTROENTEROLOGY | Facility: HOSPITAL | Age: 59
End: 2024-08-09

## 2024-08-26 ENCOUNTER — HOSPITAL ENCOUNTER (OUTPATIENT)
Dept: GASTROENTEROLOGY | Facility: HOSPITAL | Age: 59
Discharge: HOME | End: 2024-08-26
Attending: INTERNAL MEDICINE
Payer: COMMERCIAL

## 2024-08-26 VITALS
OXYGEN SATURATION: 100 % | SYSTOLIC BLOOD PRESSURE: 117 MMHG | DIASTOLIC BLOOD PRESSURE: 86 MMHG | TEMPERATURE: 98.1 F | HEART RATE: 83 BPM | RESPIRATION RATE: 16 BRPM

## 2024-08-26 DIAGNOSIS — K21.9 GASTROESOPHAGEAL REFLUX DISEASE, UNSPECIFIED WHETHER ESOPHAGITIS PRESENT: ICD-10-CM

## 2024-08-26 PROCEDURE — 91010 ESOPHAGUS MOTILITY STUDY: CPT

## 2024-08-26 PROCEDURE — 91038 ESOPH IMPED FUNCT TEST > 1HR: CPT

## 2024-08-26 NOTE — PERIOPERATIVE NURSING NOTE
Patient brought in the room and explained the esophageal manometry procedure. After the confirmation of allergies, Lidocaine 2% viscous solution given via right/ left nostrils and  a transnasal insertion of the High Resolution esophageal manometry catheter was inserted via left nostril. Patient given water to drink during the insertion and once the catheter inserted pressure bands of both Upper esophageal sphincter  (UES) and Lower esophageal sphincter ( LES) were observed on the color contour. Patient instructed to take a deep breath to verify placement of the catheter, diaphragmatic pinch noted on inspiration. Catheter was secured to left cheek. Patient was assisted to supine position .Patient was instructed to relax  while acclimating the catheter for about 5 minutes. A 30 second baseline resting pressure was obtained to identify the UES and LES followed by a series of 10 liquid swallows using 5 cc room temperature normal saline to assess esophageal motility and bolus transit. Patient administered 10 viscous swallows using 5 cc viscous solution, 1 multiple rapid drink swallow using 2 cc room temperature normal saline given a total of 5 drinks. Patient instructed to sit up at the edge of the stretcher and given 5 upright liquid swallows using 5 cc room temperature normal saline and 1 rapid drink challenge using 100 cc room temperature water. At the end of the procedure the high resolution esophageal manometry catheter was removed from the nostril intact. sensor PH probe inserted via left nostril and secured. Zephr recorder teachback performed and patient verbalized understanding. Patient instructed to return next day to have probe remove. Discharge instructions given and patient ambulated out of room in stable condition.

## 2024-12-27 ENCOUNTER — TELEPHONE (OUTPATIENT)
Age: 59
End: 2024-12-27

## 2024-12-27 NOTE — TELEPHONE ENCOUNTER
Pt calling to schedule a NP appt w/Dr Mora. Advised per chart, LOV was on 9/24/21 w/Dr Ross. Advised no new referral in chart so will need a referral from PCP in order to move forward. Pt understood.

## 2025-02-10 ENCOUNTER — OFFICE VISIT (OUTPATIENT)
Age: 60
End: 2025-02-10
Payer: COMMERCIAL

## 2025-02-10 VITALS
OXYGEN SATURATION: 98 % | WEIGHT: 133 LBS | BODY MASS INDEX: 26.81 KG/M2 | SYSTOLIC BLOOD PRESSURE: 122 MMHG | HEIGHT: 59 IN | HEART RATE: 85 BPM | TEMPERATURE: 98.1 F | DIASTOLIC BLOOD PRESSURE: 68 MMHG

## 2025-02-10 DIAGNOSIS — Z80.0 FAMILY HISTORY OF COLON CANCER: ICD-10-CM

## 2025-02-10 DIAGNOSIS — Z00.6 ENCOUNTER FOR EXAMINATION FOR NORMAL COMPARISON OR CONTROL IN CLINICAL RESEARCH PROGRAM: ICD-10-CM

## 2025-02-10 DIAGNOSIS — K58.2 IRRITABLE BOWEL SYNDROME WITH BOTH CONSTIPATION AND DIARRHEA: ICD-10-CM

## 2025-02-10 DIAGNOSIS — K21.9 GASTROESOPHAGEAL REFLUX DISEASE WITHOUT ESOPHAGITIS: Primary | ICD-10-CM

## 2025-02-10 DIAGNOSIS — R13.10 DYSPHAGIA, UNSPECIFIED TYPE: ICD-10-CM

## 2025-02-10 DIAGNOSIS — K65.4 MESENTERIC PANNICULITIS (HCC): ICD-10-CM

## 2025-02-10 PROCEDURE — 99214 OFFICE O/P EST MOD 30 MIN: CPT | Performed by: INTERNAL MEDICINE

## 2025-02-10 RX ORDER — DICYCLOMINE HYDROCHLORIDE 10 MG/1
10 CAPSULE ORAL 2 TIMES DAILY PRN
Qty: 90 CAPSULE | Refills: 0 | Status: SHIPPED | OUTPATIENT
Start: 2025-02-10

## 2025-02-10 NOTE — ASSESSMENT & PLAN NOTE
Continue Colace daily to help with constipation  Can use MiraLAX as needed  Bentyl as needed for abdominal pain  High-fiber diet  Drink at least 8 cups of water per day    Orders:    dicyclomine (BENTYL) 10 mg capsule; Take 1 capsule (10 mg total) by mouth 2 (two) times a day as needed (abdominal pain)

## 2025-02-10 NOTE — PATIENT INSTRUCTIONS
Continue Pepcid   Zofran as needed for nausea  Gas-x as needed  Pepto-bismal as needed  Bentyl as needed  Continue colace  Miralax as needed  Add Probiotic  We will get an MRI in 3 months

## 2025-02-10 NOTE — PROGRESS NOTES
Name: Divina Perales      : 1965      MRN: [id number]  Encounter Provider: Javon Eisenberg MD  Encounter Date: 2/10/2025   Encounter department: [facility]  :  Assessment & Plan  Gastroesophageal reflux disease without esophagitis  Divina Perales is a 59 y.o. female with GERD, gastric polyps, irritable bowel syndrome with mixed subtype with both diarrhea and constipation, nausea, family history of colon cancer in her father and personal history of colon polyps, family history of IBD with his sister has ulcerative colitis and a niece with Crohn's, who now presents for follow-up.  At her last she had been having dysphagia to pills, reflux, LPR and constipation.     Heartburn better. Recent viral GI bug. Still with some lingering symptoms.      EGD and colonoscopy from 2024 notable for some erythema with erosion in the antrum, normal-appearing colon aside from some small pancolonic diverticula.  Biopsies with benign duodenum, benign stomach biopsies, mild reactive changes in the esophagus, some reactive tissue in the terminal ileum and benign colon biopsies.    Manometry and 24-hour pH from 2024 notable for 100% impedance with complete clearance and normal esophageal motility.  DeMeester score normal.     Echocardiogram with EF of 61 to 65%.  Resting EKG noted to be normal sinus rhythm.    CT chest abdomen pelvis from 2025 with no acute traumatic injury, evidence of mesenteric panniculitis which is a new finding from prior CT scan, evidence of appendectomy hysterectomy and cholecystectomy.     DEXA scan from 2023 with osteoporosis.     Celiac serologies negative.  TSH normal.  Most recent CMP normal.  Vitamin D normal.  CBC normal.          Continue Pepcid 40 mg twice daily to help with GERD  Consider trial of omeprazole in the future    Gaviscon or Tums as needed for breakthrough symptoms  Zofran as needed for nausea  Avoid NSAIDs  Colonoscopy  "July 2029    Gas-x  Pepto-bismal with indigestion  Bentyl as needed  Continue colace  Add align         Irritable bowel syndrome with both constipation and diarrhea  Continue Colace daily to help with constipation  Can use MiraLAX as needed  Bentyl as needed for abdominal pain  High-fiber diet  Drink at least 8 cups of water per day    Orders:    dicyclomine (BENTYL) 10 mg capsule; Take 1 capsule (10 mg total) by mouth 2 (two) times a day as needed (abdominal pain)    Dysphagia, unspecified type  Continue pepcid       Family history of colon cancer  Colonoscopy July 2029       Mesenteric panniculitis (HCC)  Noted on CT scan from January  Obtain CT/MR enterography to evaluate further in 3 months    Orders:    MRI enterography w wo; Future        History of Present Illness     Divina Perales is a 59 y.o. female who presents for follow-up.     She had a recent stomach bug, with epigastric pain and nausea. It is lingering. She had an episode the other day with nausea, and it lasted a few hours. The stomach does not feel right. She uses zofran for nausea. She took ativan once and it helped. She feels a heaviness in the epigastric region. She is trying to change her diet. She had a BM this morning and it was good. The stomach feels off. She was out with her daughter and it came on.   She reduced processed foods. She tries to make her own foods. She incorporates more fish and shell fish. More chicken. More beans. Her last colonoscopy had inflammation.   The reflux has been better. She is on pepcid 2 times a day. A little bit of dysphagia with pills. At times a dry foods might feel like it does not go down easily. Some regurgitation. Sometimes globus.         Review of Systems  10 point ROS reviewed and negative, except as above         Objective   /68 (BP Location: Right arm, Patient Position: Sitting, Cuff Size: Adult)   Pulse 85   Temp 98.1 °F (36.7 °C) (Tympanic)   Ht 4' 11\" (1.499 m)   Wt 60.3 kg (133 lb)   " SpO2 98%   BMI 26.86 kg/m²      PHYSICAL EXAMINATION:    General Appearance:   Alert, cooperative, no distress   HEENT:  Normocephalic, atraumatic, anicteric. Neck supple, symmetrical, trachea midline.   Lungs:   Equal chest rise and unlabored breathing, normal effort, no coughing.   Cardiovascular:   No visualized JVD.   Abdomen:   No abdominal distension.   Skin:   No jaundice, rashes, or lesions.    Musculoskeletal:   Normal range of motion visualized.   Psych:  Normal affect and normal insight.   Neuro:  Alert and appropriate.

## 2025-02-10 NOTE — ASSESSMENT & PLAN NOTE
Divina Perales is a 59 y.o. female with GERD, gastric polyps, irritable bowel syndrome with mixed subtype with both diarrhea and constipation, nausea, family history of colon cancer in her father and personal history of colon polyps, family history of IBD with his sister has ulcerative colitis and a niece with Crohn's, who now presents for follow-up.  At her last she had been having dysphagia to pills, reflux, LPR and constipation.     Heartburn better. Recent viral GI bug. Still with some lingering symptoms.      EGD and colonoscopy from July 2024 notable for some erythema with erosion in the antrum, normal-appearing colon aside from some small pancolonic diverticula.  Biopsies with benign duodenum, benign stomach biopsies, mild reactive changes in the esophagus, some reactive tissue in the terminal ileum and benign colon biopsies.    Manometry and 24-hour pH from July 2024 notable for 100% impedance with complete clearance and normal esophageal motility.  DeMeester score normal.     Echocardiogram with EF of 61 to 65%.  Resting EKG noted to be normal sinus rhythm.    CT chest abdomen pelvis from January 2025 with no acute traumatic injury, evidence of mesenteric panniculitis which is a new finding from prior CT scan, evidence of appendectomy hysterectomy and cholecystectomy.     DEXA scan from June 2023 with osteoporosis.     Celiac serologies negative.  TSH normal.  Most recent CMP normal.  Vitamin D normal.  CBC normal.          Continue Pepcid 40 mg twice daily to help with GERD  Consider trial of omeprazole in the future    Gaviscon or Tums as needed for breakthrough symptoms  Zofran as needed for nausea  Avoid NSAIDs  Colonoscopy July 2029    Gas-x  Pepto-bismal with indigestion  Bentyl as needed  Continue colace  Add align

## 2025-02-12 ENCOUNTER — APPOINTMENT (OUTPATIENT)
Dept: LAB | Facility: CLINIC | Age: 60
End: 2025-02-12

## 2025-02-12 DIAGNOSIS — Z00.6 ENCOUNTER FOR EXAMINATION FOR NORMAL COMPARISON OR CONTROL IN CLINICAL RESEARCH PROGRAM: ICD-10-CM

## 2025-02-12 PROCEDURE — 36415 COLL VENOUS BLD VENIPUNCTURE: CPT

## 2025-02-13 DIAGNOSIS — K65.4 MESENTERIC PANNICULITIS (HCC): Primary | ICD-10-CM

## 2025-02-13 RX ORDER — METHYLPREDNISOLONE 32 MG/1
TABLET ORAL
Qty: 2 TABLET | Refills: 0 | Status: SHIPPED | OUTPATIENT
Start: 2025-02-13

## 2025-02-13 RX ORDER — ONDANSETRON 4 MG/1
TABLET, FILM COATED ORAL
Qty: 2 TABLET | Refills: 0 | Status: SHIPPED | OUTPATIENT
Start: 2025-02-13

## 2025-02-13 RX ORDER — DIPHENHYDRAMINE HCL 25 MG
TABLET ORAL
Qty: 1 TABLET | Refills: 0 | Status: SHIPPED | OUTPATIENT
Start: 2025-02-13

## 2025-02-19 ENCOUNTER — TELEPHONE (OUTPATIENT)
Age: 60
End: 2025-02-19

## 2025-02-19 NOTE — TELEPHONE ENCOUNTER
Patient is having a referral for a sleep consult faxed over from primary care provider  Advised once received we can schedule consult

## 2025-02-26 LAB
APOB+LDLR+PCSK9 GENE MUT ANL BLD/T: NOT DETECTED
BRCA1+BRCA2 DEL+DUP + FULL MUT ANL BLD/T: NOT DETECTED
MLH1+MSH2+MSH6+PMS2 GN DEL+DUP+FUL M: NOT DETECTED

## 2025-03-11 DIAGNOSIS — K58.2 IRRITABLE BOWEL SYNDROME WITH BOTH CONSTIPATION AND DIARRHEA: ICD-10-CM

## 2025-03-11 RX ORDER — DICYCLOMINE HYDROCHLORIDE 10 MG/1
CAPSULE ORAL
Qty: 180 CAPSULE | Refills: 1 | Status: SHIPPED | OUTPATIENT
Start: 2025-03-11

## 2025-04-14 PROBLEM — R09.82 PND (POST-NASAL DRIP): Status: ACTIVE | Noted: 2025-04-14

## 2025-05-19 ENCOUNTER — HOSPITAL ENCOUNTER (OUTPATIENT)
Dept: MRI IMAGING | Facility: HOSPITAL | Age: 60
Discharge: HOME | End: 2025-05-19
Attending: INTERNAL MEDICINE
Payer: COMMERCIAL

## 2025-05-19 DIAGNOSIS — K65.4 MESENTERIC PANNICULITIS (HCC): ICD-10-CM

## 2025-05-19 PROCEDURE — 74183 MRI ABD W/O CNTR FLWD CNTR: CPT

## 2025-05-19 PROCEDURE — 72197 MRI PELVIS W/O & W/DYE: CPT

## 2025-05-19 PROCEDURE — A9585 GADOBUTROL INJECTION: HCPCS | Performed by: RADIOLOGY

## 2025-05-19 RX ORDER — GADOBUTROL 604.72 MG/ML
6 INJECTION INTRAVENOUS
Status: COMPLETED | OUTPATIENT
Start: 2025-05-19 | End: 2025-05-19

## 2025-05-19 RX ADMIN — GLUCAGON 1 MG: KIT at 09:10

## 2025-05-19 RX ADMIN — GADOBUTROL 6 ML: 604.72 INJECTION INTRAVENOUS at 09:10

## 2025-05-27 ENCOUNTER — NURSE TRIAGE (OUTPATIENT)
Age: 60
End: 2025-05-27

## 2025-05-27 ENCOUNTER — TELEPHONE (OUTPATIENT)
Age: 60
End: 2025-05-27

## 2025-05-27 DIAGNOSIS — R10.13 EPIGASTRIC PAIN: Primary | ICD-10-CM

## 2025-05-27 RX ORDER — OMEPRAZOLE 40 MG/1
40 CAPSULE, DELAYED RELEASE ORAL DAILY
Qty: 14 CAPSULE | Refills: 1 | Status: SHIPPED | OUTPATIENT
Start: 2025-05-27 | End: 2025-06-03

## 2025-05-27 NOTE — TELEPHONE ENCOUNTER
"REASON FOR CONVERSATION: Abdominal Pain    SYMPTOMS: EPIGASTRIC DISTENSION, PRESSURE    OTHER HEALTH INFORMATION: SYMPTOMS PRESENT FOR 2 WEEKS, HAPPENS 1 HOUR AFTER EATING EACH MEAL, PT IS CAREFUL WITH HER DIET, HYDRATING WELL. PT HAS TRIED IBGUARD, BENTYL, GAS-X WITHOUT RELIEF. PT IS TRANSFER OF CARE FROM DR. PAULSON.     PROTOCOL DISPOSITION: SEE WITHIN 2 WEEKS    CARE ADVICE PROVIDED: BLAND DIET, HYDRATION    PRACTICE FOLLOW-UP: OFFICE VISIT SCHEDULED FOR 6/11/25.               Reason for Disposition   Abdominal pain is a chronic symptom (recurrent or ongoing AND lasting > 4 weeks)    Answer Assessment - Initial Assessment Questions  1. LOCATION: \"Where does it hurt?\"       EPIGASTRIC PRESSURE  2. RADIATION: \"Does the pain shoot anywhere else?\" (e.g., chest, back)      DENIES  3. ONSET: \"When did the pain begin?\" (e.g., minutes, hours or days ago)       2 WEEKS  5. PATTERN \"Does the pain come and go, or is it constant?\"      INTERMITTENT  6. SEVERITY: \"How bad is the pain?\"  (e.g., Scale 1-10; mild, moderate, or severe)      MODERATE  7. RECURRENT SYMPTOM: \"Have you ever had this type of stomach pain before?\" If Yes, ask: \"When was the last time?\" and \"What happened that time?\"       DENIES  8. AGGRAVATING FACTORS: \"Does anything seem to cause this pain?\" (e.g., foods, stress, alcohol)      FOOD  9. CARDIAC SYMPTOMS: \"Do you have any of the following symptoms: chest pain, difficulty breathing, sweating, nausea?\"      DENIES  10. OTHER SYMPTOMS: \"Do you have any other symptoms?\" (e.g., back pain, diarrhea, fever, urination pain, vomiting)        DENIES    Protocols used: Abdominal Pain - Upper-Adult-[place]    "

## 2025-05-27 NOTE — TELEPHONE ENCOUNTER
Patients GI provider:  Dr. Eisenberg    Number to return call: [phone]    Reason for call: Karina from the Radiology called in with significant findings from MRI Enterograpgy performed on 5/19/2025. Please reach out to patient. Thank you.    Scheduled procedure/appointment date if applicable: Apt/procedure 6/11/2025

## 2025-05-27 NOTE — TELEPHONE ENCOUNTER
Spoke with pt, reviewed results and recommendation from separate encounter to start omeprazole. Pt does not wish to restart omeprazole and will continue with the Pepcid 40 mg. Will further discuss at upcoming ov

## 2025-06-06 ENCOUNTER — HOSPITAL ENCOUNTER (OUTPATIENT)
Dept: BONE DENSITY | Facility: MEDICAL CENTER | Age: 60
Discharge: HOME | End: 2025-06-06
Attending: INTERNAL MEDICINE
Payer: COMMERCIAL

## 2025-06-06 VITALS — HEIGHT: 59 IN | BODY MASS INDEX: 26.81 KG/M2 | WEIGHT: 133 LBS

## 2025-06-06 DIAGNOSIS — Z13.820 SCREENING FOR OSTEOPOROSIS: ICD-10-CM

## 2025-06-06 DIAGNOSIS — M81.0 AGE-RELATED OSTEOPOROSIS WITHOUT CURRENT PATHOLOGICAL FRACTURE: ICD-10-CM

## 2025-06-06 PROCEDURE — 77080 DXA BONE DENSITY AXIAL: CPT

## 2025-06-11 ENCOUNTER — APPOINTMENT (OUTPATIENT)
Dept: LAB | Facility: MEDICAL CENTER | Age: 60
End: 2025-06-11
Payer: COMMERCIAL

## 2025-06-11 ENCOUNTER — OFFICE VISIT (OUTPATIENT)
Dept: GASTROENTEROLOGY | Facility: MEDICAL CENTER | Age: 60
End: 2025-06-11
Payer: COMMERCIAL

## 2025-06-11 VITALS
HEIGHT: 59 IN | OXYGEN SATURATION: 96 % | WEIGHT: 126.6 LBS | DIASTOLIC BLOOD PRESSURE: 74 MMHG | TEMPERATURE: 97.3 F | BODY MASS INDEX: 25.52 KG/M2 | HEART RATE: 80 BPM | SYSTOLIC BLOOD PRESSURE: 118 MMHG

## 2025-06-11 DIAGNOSIS — K86.2 PANCREATIC CYST: ICD-10-CM

## 2025-06-11 DIAGNOSIS — M25.50 POLYARTHRALGIA: ICD-10-CM

## 2025-06-11 DIAGNOSIS — K58.2 IRRITABLE BOWEL SYNDROME WITH BOTH CONSTIPATION AND DIARRHEA: ICD-10-CM

## 2025-06-11 DIAGNOSIS — K65.4 MESENTERIC PANNICULITIS (HCC): ICD-10-CM

## 2025-06-11 DIAGNOSIS — Z86.0100 PERSONAL HISTORY OF COLON POLYPS, UNSPECIFIED: ICD-10-CM

## 2025-06-11 DIAGNOSIS — Z80.0 FAMILY HISTORY OF COLON CANCER: ICD-10-CM

## 2025-06-11 DIAGNOSIS — K21.9 GASTROESOPHAGEAL REFLUX DISEASE WITHOUT ESOPHAGITIS: Primary | ICD-10-CM

## 2025-06-11 DIAGNOSIS — E55.9 VITAMIN D DEFICIENCY: ICD-10-CM

## 2025-06-11 LAB
25(OH)D3 SERPL-MCNC: 43.2 NG/ML (ref 30–100)
ALBUMIN SERPL BCG-MCNC: 4.1 G/DL (ref 3.5–5)
ALP SERPL-CCNC: 61 U/L (ref 34–104)
ALT SERPL W P-5'-P-CCNC: 16 U/L (ref 7–52)
ANION GAP SERPL CALCULATED.3IONS-SCNC: 4 MMOL/L (ref 4–13)
AST SERPL W P-5'-P-CCNC: 20 U/L (ref 13–39)
BASOPHILS # BLD AUTO: 0.01 THOUSANDS/ÂΜL (ref 0–0.1)
BASOPHILS NFR BLD AUTO: 0 % (ref 0–1)
BILIRUB SERPL-MCNC: 0.31 MG/DL (ref 0.2–1)
BUN SERPL-MCNC: 22 MG/DL (ref 5–25)
CALCIUM SERPL-MCNC: 8.6 MG/DL (ref 8.4–10.2)
CHLORIDE SERPL-SCNC: 100 MMOL/L (ref 96–108)
CO2 SERPL-SCNC: 35 MMOL/L (ref 21–32)
CREAT SERPL-MCNC: 0.76 MG/DL (ref 0.6–1.3)
CRP SERPL QL: 1.6 MG/L
EOSINOPHIL # BLD AUTO: 0.03 THOUSAND/ÂΜL (ref 0–0.61)
EOSINOPHIL NFR BLD AUTO: 1 % (ref 0–6)
ERYTHROCYTE [DISTWIDTH] IN BLOOD BY AUTOMATED COUNT: 12.9 % (ref 11.6–15.1)
ERYTHROCYTE [SEDIMENTATION RATE] IN BLOOD: 15 MM/HOUR (ref 0–29)
GFR SERPL CREATININE-BSD FRML MDRD: 85 ML/MIN/1.73SQ M
GLUCOSE SERPL-MCNC: 105 MG/DL (ref 65–140)
HCT VFR BLD AUTO: 42.4 % (ref 34.8–46.1)
HGB BLD-MCNC: 13.4 G/DL (ref 11.5–15.4)
IMM GRANULOCYTES # BLD AUTO: 0.02 THOUSAND/UL (ref 0–0.2)
IMM GRANULOCYTES NFR BLD AUTO: 0 % (ref 0–2)
LYMPHOCYTES # BLD AUTO: 1.47 THOUSANDS/ÂΜL (ref 0.6–4.47)
LYMPHOCYTES NFR BLD AUTO: 30 % (ref 14–44)
MCH RBC QN AUTO: 30.7 PG (ref 26.8–34.3)
MCHC RBC AUTO-ENTMCNC: 31.6 G/DL (ref 31.4–37.4)
MCV RBC AUTO: 97 FL (ref 82–98)
MONOCYTES # BLD AUTO: 0.27 THOUSAND/ÂΜL (ref 0.17–1.22)
MONOCYTES NFR BLD AUTO: 6 % (ref 4–12)
NEUTROPHILS # BLD AUTO: 3.05 THOUSANDS/ÂΜL (ref 1.85–7.62)
NEUTS SEG NFR BLD AUTO: 63 % (ref 43–75)
NRBC BLD AUTO-RTO: 0 /100 WBCS
PLATELET # BLD AUTO: 212 THOUSANDS/UL (ref 149–390)
PMV BLD AUTO: 10.6 FL (ref 8.9–12.7)
POTASSIUM SERPL-SCNC: 4.1 MMOL/L (ref 3.5–5.3)
PROT SERPL-MCNC: 6.4 G/DL (ref 6.4–8.4)
RBC # BLD AUTO: 4.37 MILLION/UL (ref 3.81–5.12)
RHEUMATOID FACT SERPL-ACNC: <10 IU/ML
SODIUM SERPL-SCNC: 139 MMOL/L (ref 135–147)
WBC # BLD AUTO: 4.85 THOUSAND/UL (ref 4.31–10.16)

## 2025-06-11 PROCEDURE — 36415 COLL VENOUS BLD VENIPUNCTURE: CPT

## 2025-06-11 PROCEDURE — 86200 CCP ANTIBODY: CPT

## 2025-06-11 PROCEDURE — 82306 VITAMIN D 25 HYDROXY: CPT

## 2025-06-11 PROCEDURE — 85652 RBC SED RATE AUTOMATED: CPT

## 2025-06-11 PROCEDURE — 99214 OFFICE O/P EST MOD 30 MIN: CPT | Performed by: PHYSICIAN ASSISTANT

## 2025-06-11 PROCEDURE — 80053 COMPREHEN METABOLIC PANEL: CPT

## 2025-06-11 PROCEDURE — 86618 LYME DISEASE ANTIBODY: CPT

## 2025-06-11 PROCEDURE — 85025 COMPLETE CBC W/AUTO DIFF WBC: CPT

## 2025-06-11 PROCEDURE — 86140 C-REACTIVE PROTEIN: CPT

## 2025-06-11 PROCEDURE — 86431 RHEUMATOID FACTOR QUANT: CPT

## 2025-06-11 PROCEDURE — 86225 DNA ANTIBODY NATIVE: CPT

## 2025-06-11 PROCEDURE — 86038 ANTINUCLEAR ANTIBODIES: CPT

## 2025-06-11 NOTE — ASSESSMENT & PLAN NOTE
Last colonoscopy 7/2024 without polyps and a 5 year recall due to a family history of colon cancer in her father

## 2025-06-11 NOTE — ASSESSMENT & PLAN NOTE
3 mm pancreatic tail cyst seen on recent MRE 5/2025. Follow-up with repeat MRI/MRCP in 2 years (5/2027). Will discuss with patient at next visit

## 2025-06-11 NOTE — ASSESSMENT & PLAN NOTE
Normal 24 hour pH testing. At baseline is on Pepcid 40mg twice daily. Also currently on a two week course of prilosec given current upper abdominal discomfort and bloating. Can discontinue after two weeks. If symptoms worsen can extend out two months

## 2025-06-11 NOTE — ASSESSMENT & PLAN NOTE
The patient currently has abdominal pain and bloating, mainly in her upper abdomen. Bowel movements regular. R/o SIBO. If negative start low fodmap diet. Continue as needed bentyl and IBgard

## 2025-06-11 NOTE — PROGRESS NOTES
Name: Divina Perales      : 1965      MRN: [id number]  Encounter Provider: Monet Bustos PA-C  Encounter Date: 2025   Encounter department: [facility]  :  Assessment & Plan  Gastroesophageal reflux disease without esophagitis  Normal 24 hour pH testing. At baseline is on Pepcid 40mg twice daily. Also currently on a two week course of prilosec given current upper abdominal discomfort and bloating. Can discontinue after two weeks. If symptoms worsen can extend out two months       Irritable bowel syndrome with both constipation and diarrhea  The patient currently has abdominal pain and bloating, mainly in her upper abdomen. Bowel movements regular. R/o SIBO. If negative start low fodmap diet. Continue as needed bentyl and IBgard        Mesenteric panniculitis (HCC)  Noted on CT scan from January. Follow up MR enterography normal. Okay to  observe according to current guidelines.       Personal history of colon polyps, unspecified  Family history of colon cancer  Last colonoscopy 2024 without polyps and a 5 year recall due to a family history of colon cancer in her father       Pancreatic cyst  3 mm pancreatic tail cyst seen on recent MRE 2025. Follow-up with repeat MRI/MRCP in 2 years (2027). Will discuss with patient at next visit            History of Present Illness   Divina Perales is a 60 y.o. female who presents secondary to abdominal pain and bloating.  The patient states that she is getting symptoms on a nearly daily basis.  She has been having some sharp abdominal pains as well as upper abdominal bloating and pressure.  She tried to cut out dairy and gluten without any improvement.  She was started on a 2-week course of Prilosec which she started in .  She has noticed some improvement but not complete resolution of her symptoms.  No alarm symptoms.    Family history of colon cancer in father  Family history of UC in sister, Crohn's on  "niece    TSH normal. Celiac serologies negative.    MRE 5/19/2025  IMPRESSION:  - Inflammation: No imaging signs of active inflammation.  - Stricture: None.  - Penetrating complication: None.  - Perianal disease: None.  - Other complications: None.  Mild mesenteric panniculitis.  3 mm pancreatic tail cyst. Follow-up with repeat MRI/MRCP in 2 years.    Esophageal manometry 8/26/2024  Normal     24 hour pH testing 8/26/2024  Normal     EGD 7/15/2024  The esophagus appeared normal. Performed random biopsy to rule out eosinophilic esophagitis.  Erythematous mucosa with erosion in the antrum; performed cold forceps biopsy  The duodenum appeared normal. Performed random biopsy to rule out celiac disease.  Path: normal duodenal, normal stomach     Colonoscopy 7/15/2024  Normal. Performed random biopsy using biopsy forceps.  Diverticulosis  Path: normal random colon biopsies   Recall: 5 years (7/14/29)    Colonoscopy 7/11/2022  1.  Diverticulosis of the left right colon noted  2. Otherwise normal colonoscopy    EGD 3/2/2021  Duodenitis gastritis esophagitis, gastric polyp     Colonoscopy 3/2/2021  Family history colon cancer father nurse who 70s 1 colon polyp removed today some nonspecific colitis on the left side very patchy in nature of questionable significance descending diverticulum seen and a hepatic flexure diverticulum seen, moderate size internal hemorrhoids       Review of Systems A complete review of systems is negative other than that noted above in the HPI.      Current Medications[1]  Objective   /74 (BP Location: Left arm, Patient Position: Sitting, Cuff Size: Standard)   Pulse 80   Temp (!) 97.3 °F (36.3 °C) (Tympanic)   Ht 4' 11\" (1.499 m)   Wt 57.4 kg (126 lb 9.6 oz)   SpO2 96%   BMI 25.57 kg/m²       Physical Exam  Vitals and nursing note reviewed.   Constitutional:       General: She is not in acute distress.     Appearance: She is well-developed.   HENT:      Head: Normocephalic and " atraumatic.     Eyes:      Conjunctiva/sclera: Conjunctivae normal.       Cardiovascular:      Rate and Rhythm: Normal rate and regular rhythm.      Heart sounds: No murmur heard.  Pulmonary:      Effort: Pulmonary effort is normal. No respiratory distress.      Breath sounds: Normal breath sounds.   Abdominal:      Palpations: Abdomen is soft.      Tenderness: There is abdominal tenderness in the right upper quadrant, epigastric area and left upper quadrant. There is guarding.     Musculoskeletal:         General: No swelling.      Cervical back: Neck supple.     Skin:     General: Skin is warm and dry.      Capillary Refill: Capillary refill takes less than 2 seconds.     Neurological:      Mental Status: She is alert.     Psychiatric:         Mood and Affect: Mood normal.          Lab Results: I personally reviewed relevant lab results.       Results for orders placed during the hospital encounter of 07/15/24    Colonoscopy    Narrative  Table formatting from the original result was not included.  [facility]  [address]  [place] [address]  [phone]      DATE OF SERVICE:  7/15/24    PHYSICIAN(S):  Attending:  Javon Eisenberg MD    Fellow:  No Staff Documented      INDICATION:  Family history of colon cancer, Family history of inflammatory bowel disease, History of colon polyps      POST-OP DIAGNOSIS:  See the impression below.    HISTORY:  Prior colonoscopy: 5 years ago.    BOWEL PREPARATION:        PREPROCEDURE:  Informed consent was obtained for the procedure, including sedation. Risks including but not limited to bleeding, infection, perforation, adverse drug reaction and aspiration were explained in detail. Also explained about less than 100% sensitivity with the exam and other alternatives. The patient was placed in the left lateral decubitus position.    Procedure: Colonoscopy    DETAILS OF PROCEDURE:  Patient was taken to the procedure room where a time out  was performed to confirm correct patient and correct procedure. The patient underwent monitored anesthesia care, which was administered by an anesthesia professional. The patient's blood pressure, ECG, ETCO2, heart rate, level of consciousness, oxygen and respirations were monitored throughout the procedure. A digital rectal exam was performed. A perianal exam was performed. The scope was introduced through the anus and advanced to the terminal ileum. Retroflexion was performed in the rectum. The quality of bowel preparation was evaluated using the [place] Bowel Preparation Scale with scores of: right colon = 2, transverse colon = 2, left colon = 2. The total BBPS score was 6. Bowel prep was adequate. The patient experienced no blood loss. The procedure was not difficult. The patient tolerated the procedure well. There were no apparent adverse events.      ANESTHESIA INFORMATION:  ASA: II  Anesthesia Type: IV Sedation with Anesthesia    MEDICATIONS:  No administrations occurring from 0850 to 0918 on 07/15/24        FINDINGS:  All observed locations appeared normal. Performed random biopsy using biopsy forceps.  Multiple small pancolonic diverticula      EVENTS:  Procedure Events  Event Event Time  ENDO CECUM REACHED 7/15/2024  9:07 AM  ENDO SCOPE OUT TIME 7/15/2024  9:18 AM      SPECIMENS:  ID Type Source Tests Collected by Time Destination  1 : Duodenum bx-cold Tissue Duodenum TISSUE EXAM Javon Eisenberg MD 7/15/2024  8:55 AM  2 : Gastric bx-cold Tissue Stomach TISSUE EXAM Javon Eisenberg MD 7/15/2024  8:56 AM  3 : Distal esophagus bx-cold Tissue Esophagus TISSUE EXAM Javon Eisenberg MD 7/15/2024  8:58 AM  4 : Proximal esophagus bx-cold Tissue Esophagus TISSUE EXAM Javon Eisenberg MD 7/15/2024  8:59 AM  5 : Terminal Ileum bx-cold R/O enteritis Tissue Terminal Ileum TISSUE EXAM Javon Eisenberg MD 7/15/2024  9:08 AM  6 : Random colon bx-cold Tissue Colon TISSUE EXAM Javon Eisenberg MD  7/15/2024  9:11 AM      EQUIPMENT:  Colonoscope -CF   ENDOCUFF VISION LRG GREEN ID [id number]        Impression  Normal. Performed random biopsy using biopsy forceps.  Diverticulosis        RECOMMENDATION:  Repeat colonoscopy in 5 years, due: 7/14/2029  Family history of colon cancer  Family history of colon polyps    High fiber diet            Javon Eisenberg MD         [1]   Current Outpatient Medications   Medication Sig Dispense Refill    dicyclomine (BENTYL) 10 mg capsule TAKE 1 CAPSULE (10 MG TOTAL) BY MOUTH TWICE A DAY AS NEEDED FOR ABDOMINAL PAIN 180 capsule 1    docusate sodium (COLACE) 100 mg capsule One capsule by mouth every other (Patient taking differently: 100 mg in the morning One capsule by mouth every other) 60 capsule 1    famotidine (PEPCID) 40 MG tablet Take 1 tablet (40 mg total) by mouth 2 (two) times a day 180 tablet 1    fexofenadine (Allegra Allergy) 180 MG tablet Take 180 mg by mouth in the morning.      LORazepam (ATIVAN) 0.5 mg tablet Take 1 tablet every 8 hours as needed. 90 tablet 0    ondansetron (ZOFRAN) 8 mg tablet Take 1 tablet (8 mg total) by mouth every 8 (eight) hours as needed for nausea or vomiting 30 tablet 0    Probiotic Product (PROBIOTIC PO) Take by mouth      propranolol (INDERAL) 40 mg tablet Take 1 tablet (40 mg total) by mouth 2 (two) times a day (Patient taking differently: Take 40 mg by mouth if needed) 60 tablet 1    Synthroid 75 MCG tablet TAKE 1 TABLET DAILY 90 tablet 1    venlafaxine (EFFEXOR-XR) 37.5 mg 24 hr capsule Take 1 capsule (37.5 mg total) by mouth daily with breakfast 90 capsule 1    zolpidem (AMBIEN) 10 mg tablet Take 10 mg by mouth if needed for sleep       No current facility-administered medications for this visit.

## 2025-06-12 LAB — B BURGDOR IGG+IGM SER QL IA: NEGATIVE

## 2025-06-16 LAB
CCP AB SER IA-ACNC: 0.5 (ref ?–10)
DSDNA IGG SERPL IA-ACNC: <0.9 IU/ML (ref ?–15)
NUCLEAR IGG SER IA-RTO: <0.09 RATIO (ref ?–1)

## 2025-06-18 ENCOUNTER — TELEPHONE (OUTPATIENT)
Dept: GASTROENTEROLOGY | Facility: MEDICAL CENTER | Age: 60
End: 2025-06-18

## 2025-06-26 ENCOUNTER — OFFICE VISIT (OUTPATIENT)
Dept: GASTROENTEROLOGY | Facility: CLINIC | Age: 60
End: 2025-06-26
Payer: COMMERCIAL

## 2025-06-26 ENCOUNTER — TELEPHONE (OUTPATIENT)
Dept: GASTROENTEROLOGY | Facility: MEDICAL CENTER | Age: 60
End: 2025-06-26

## 2025-06-26 DIAGNOSIS — K63.89 INTESTINAL BACTERIAL OVERGROWTH: Primary | ICD-10-CM

## 2025-06-26 DIAGNOSIS — R14.0 BLOATING: ICD-10-CM

## 2025-06-26 DIAGNOSIS — R10.30 LOWER ABDOMINAL PAIN: Primary | ICD-10-CM

## 2025-06-26 PROCEDURE — PBNCHG PB NO CHARGE PLACEHOLDER: Performed by: INTERNAL MEDICINE

## 2025-06-26 PROCEDURE — 91065 BREATH HYDROGEN/METHANE TEST: CPT | Performed by: INTERNAL MEDICINE

## 2025-06-26 RX ORDER — NEOMYCIN SULFATE 500 MG/1
500 TABLET ORAL 2 TIMES DAILY
Qty: 28 TABLET | Refills: 0 | Status: SHIPPED | OUTPATIENT
Start: 2025-06-26 | End: 2025-07-10

## 2025-06-26 NOTE — TELEPHONE ENCOUNTER
Pt called regarding appointment notification. Advised it is because Her SIBO test is being processed, and not for an appointment she needs to come into the office for

## 2025-06-26 NOTE — TELEPHONE ENCOUNTER
----- Message from Leroy Delgadillo MD sent at 6/26/2025 10:16 AM EDT -----    ----- Message -----  From: Yajaira Victor MA  Sent: 6/26/2025   8:23 AM EDT  To: Leroy Delgadillo MD    Cc: Monet Bustos PA-C

## 2025-06-26 NOTE — TELEPHONE ENCOUNTER
Please let the patient know her testing is positive for bacterial overgrowth, recommend treatment which is 2 weeks of xifaxan and neomycin if she is agreeable

## 2025-06-26 NOTE — PROGRESS NOTES
[facility]       Bacterial Overgrowth Analytical Record    Divina Perales 60 y.o. female MRN: [id number]      Date of Test: 6/15/25    Substrate Given: Lactulose    Ordering Provider: Monet Bustos PA-C    Medical Assistant: Yajaira HERNANDEZ    Symptoms: bloating, Abd pain    The patient presents for bacterial overgrowth testing.    Patient fasted overnight. Baseline readings obtained.   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  Defer management plan to patient's GI provider Monet Bustos PA-C.

## 2025-06-26 NOTE — TELEPHONE ENCOUNTER
I spoke with the pt regarding the provider messages about the results. Pt agreed the treatment. No further question.

## 2025-07-03 ENCOUNTER — PATIENT MESSAGE (OUTPATIENT)
Dept: GASTROENTEROLOGY | Facility: MEDICAL CENTER | Age: 60
End: 2025-07-03

## 2025-07-03 DIAGNOSIS — K63.89 INTESTINAL BACTERIAL OVERGROWTH: Primary | ICD-10-CM

## 2025-07-03 DIAGNOSIS — R19.7 ACUTE DIARRHEA: Primary | ICD-10-CM

## 2025-07-03 RX ORDER — PEPPERMINT OIL 90 MG
CAPSULE, DELAYED, AND EXTENDED RELEASE ORAL
Qty: 60 CAPSULE | Refills: 11 | Status: SHIPPED | OUTPATIENT
Start: 2025-07-03

## 2025-07-03 RX ORDER — PEPPERMINT OIL 90 MG
CAPSULE, DELAYED, AND EXTENDED RELEASE ORAL
Qty: 60 CAPSULE | Refills: 11 | Status: SHIPPED | OUTPATIENT
Start: 2025-07-03 | End: 2025-07-03

## 2025-07-03 RX ORDER — SACCHAROMYCES BOULARDII 250 MG
250 CAPSULE ORAL 2 TIMES DAILY
Qty: 30 CAPSULE | Refills: 11 | Status: SHIPPED | OUTPATIENT
Start: 2025-07-03

## 2025-07-05 ENCOUNTER — APPOINTMENT (EMERGENCY)
Dept: CT IMAGING | Facility: HOSPITAL | Age: 60
DRG: 392 | End: 2025-07-05
Payer: COMMERCIAL

## 2025-07-05 ENCOUNTER — HOSPITAL ENCOUNTER (INPATIENT)
Facility: HOSPITAL | Age: 60
LOS: 3 days | Discharge: HOME | DRG: 392 | End: 2025-07-08
Attending: EMERGENCY MEDICINE | Admitting: INTERNAL MEDICINE
Payer: COMMERCIAL

## 2025-07-05 DIAGNOSIS — R10.13 EPIGASTRIC PAIN: Primary | ICD-10-CM

## 2025-07-05 DIAGNOSIS — R10.9 ABDOMINAL PAIN: ICD-10-CM

## 2025-07-05 DIAGNOSIS — R93.5 ABNORMAL CT OF THE ABDOMEN: ICD-10-CM

## 2025-07-05 DIAGNOSIS — R11.0 NAUSEA: ICD-10-CM

## 2025-07-05 PROBLEM — R03.0 ELEVATED BLOOD PRESSURE READING: Status: ACTIVE | Noted: 2025-07-05

## 2025-07-05 PROBLEM — F41.9 ANXIETY: Status: ACTIVE | Noted: 2025-07-05

## 2025-07-05 PROBLEM — K63.8219 SMALL INTESTINAL BACTERIAL OVERGROWTH (SIBO): Status: ACTIVE | Noted: 2025-07-05

## 2025-07-05 LAB
ALBUMIN SERPL BCG-MCNC: 5 G/DL (ref 3.5–5)
ALP SERPL-CCNC: 61 U/L (ref 34–104)
ALT SERPL W P-5'-P-CCNC: 25 U/L (ref 7–52)
ANION GAP SERPL CALCULATED.3IONS-SCNC: 7 MMOL/L (ref 4–13)
AST SERPL W P-5'-P-CCNC: 30 U/L (ref 13–39)
ATRIAL RATE: 77 BPM
BASOPHILS # BLD AUTO: 0.02 THOUSANDS/ÂΜL (ref 0–0.1)
BASOPHILS NFR BLD AUTO: 0 % (ref 0–1)
BILIRUB SERPL-MCNC: 0.55 MG/DL (ref 0.2–1)
BILIRUB UR QL STRIP: NEGATIVE
BUN SERPL-MCNC: 15 MG/DL (ref 5–25)
CALCIUM SERPL-MCNC: 9.8 MG/DL (ref 8.4–10.2)
CARDIAC TROPONIN I PNL SERPL HS: 3 NG/L (ref ?–50)
CHLORIDE SERPL-SCNC: 102 MMOL/L (ref 96–108)
CLARITY UR: CLEAR
CO2 SERPL-SCNC: 31 MMOL/L (ref 21–32)
COLOR UR: COLORLESS
CREAT SERPL-MCNC: 0.91 MG/DL (ref 0.6–1.3)
EOSINOPHIL # BLD AUTO: 0.02 THOUSAND/ÂΜL (ref 0–0.61)
EOSINOPHIL NFR BLD AUTO: 0 % (ref 0–6)
ERYTHROCYTE [DISTWIDTH] IN BLOOD BY AUTOMATED COUNT: 12.5 % (ref 11.6–15.1)
GFR SERPL CREATININE-BSD FRML MDRD: 68 ML/MIN/1.73SQ M
GLUCOSE SERPL-MCNC: 107 MG/DL (ref 65–140)
GLUCOSE UR STRIP-MCNC: NEGATIVE MG/DL
HCT VFR BLD AUTO: 46.2 % (ref 34.8–46.1)
HGB BLD-MCNC: 15.6 G/DL (ref 11.5–15.4)
HGB UR QL STRIP.AUTO: NEGATIVE
IMM GRANULOCYTES # BLD AUTO: 0.01 THOUSAND/UL (ref 0–0.2)
IMM GRANULOCYTES NFR BLD AUTO: 0 % (ref 0–2)
KETONES UR STRIP-MCNC: NEGATIVE MG/DL
LEUKOCYTE ESTERASE UR QL STRIP: NEGATIVE
LIPASE SERPL-CCNC: 38 U/L (ref 11–82)
LYMPHOCYTES # BLD AUTO: 1.42 THOUSANDS/ÂΜL (ref 0.6–4.47)
LYMPHOCYTES NFR BLD AUTO: 29 % (ref 14–44)
MCH RBC QN AUTO: 31.3 PG (ref 26.8–34.3)
MCHC RBC AUTO-ENTMCNC: 33.8 G/DL (ref 31.4–37.4)
MCV RBC AUTO: 93 FL (ref 82–98)
MONOCYTES # BLD AUTO: 0.27 THOUSAND/ÂΜL (ref 0.17–1.22)
MONOCYTES NFR BLD AUTO: 5 % (ref 4–12)
NEUTROPHILS # BLD AUTO: 3.23 THOUSANDS/ÂΜL (ref 1.85–7.62)
NEUTS SEG NFR BLD AUTO: 66 % (ref 43–75)
NITRITE UR QL STRIP: NEGATIVE
NRBC BLD AUTO-RTO: 0 /100 WBCS
P AXIS: 56 DEGREES
PH UR STRIP.AUTO: 8 [PH]
PLATELET # BLD AUTO: 214 THOUSANDS/UL (ref 149–390)
PMV BLD AUTO: 10.3 FL (ref 8.9–12.7)
POTASSIUM SERPL-SCNC: 3.9 MMOL/L (ref 3.5–5.3)
PR INTERVAL: 164 MS
PROT SERPL-MCNC: 7.5 G/DL (ref 6.4–8.4)
PROT UR STRIP-MCNC: NEGATIVE MG/DL
QRS AXIS: 6 DEGREES
QRSD INTERVAL: 130 MS
QT INTERVAL: 392 MS
QTC INTERVAL: 443 MS
RBC # BLD AUTO: 4.98 MILLION/UL (ref 3.81–5.12)
SODIUM SERPL-SCNC: 140 MMOL/L (ref 135–147)
SP GR UR STRIP.AUTO: 1 (ref 1–1.03)
T WAVE AXIS: 95 DEGREES
UROBILINOGEN UR STRIP-ACNC: <2 MG/DL
VENTRICULAR RATE: 77 BPM
WBC # BLD AUTO: 4.97 THOUSAND/UL (ref 4.31–10.16)

## 2025-07-05 PROCEDURE — 99285 EMERGENCY DEPT VISIT HI MDM: CPT | Performed by: EMERGENCY MEDICINE

## 2025-07-05 PROCEDURE — 36415 COLL VENOUS BLD VENIPUNCTURE: CPT | Performed by: EMERGENCY MEDICINE

## 2025-07-05 PROCEDURE — 93005 ELECTROCARDIOGRAM TRACING: CPT

## 2025-07-05 PROCEDURE — 99285 EMERGENCY DEPT VISIT HI MDM: CPT

## 2025-07-05 PROCEDURE — 96375 TX/PRO/DX INJ NEW DRUG ADDON: CPT

## 2025-07-05 PROCEDURE — 81003 URINALYSIS AUTO W/O SCOPE: CPT | Performed by: EMERGENCY MEDICINE

## 2025-07-05 PROCEDURE — 74176 CT ABD & PELVIS W/O CONTRAST: CPT

## 2025-07-05 PROCEDURE — 96374 THER/PROPH/DIAG INJ IV PUSH: CPT

## 2025-07-05 PROCEDURE — 84484 ASSAY OF TROPONIN QUANT: CPT | Performed by: EMERGENCY MEDICINE

## 2025-07-05 PROCEDURE — 99223 1ST HOSP IP/OBS HIGH 75: CPT | Performed by: INTERNAL MEDICINE

## 2025-07-05 PROCEDURE — 80053 COMPREHEN METABOLIC PANEL: CPT | Performed by: EMERGENCY MEDICINE

## 2025-07-05 PROCEDURE — 83690 ASSAY OF LIPASE: CPT | Performed by: EMERGENCY MEDICINE

## 2025-07-05 PROCEDURE — 85025 COMPLETE CBC W/AUTO DIFF WBC: CPT | Performed by: EMERGENCY MEDICINE

## 2025-07-05 PROCEDURE — 93010 ELECTROCARDIOGRAM REPORT: CPT | Performed by: INTERNAL MEDICINE

## 2025-07-05 RX ORDER — LORATADINE 10 MG/1
10 TABLET ORAL DAILY
Status: DISCONTINUED | OUTPATIENT
Start: 2025-07-05 | End: 2025-07-08 | Stop reason: HOSPADM

## 2025-07-05 RX ORDER — ONDANSETRON 2 MG/ML
4 INJECTION INTRAMUSCULAR; INTRAVENOUS EVERY 4 HOURS PRN
Status: DISCONTINUED | OUTPATIENT
Start: 2025-07-05 | End: 2025-07-08 | Stop reason: HOSPADM

## 2025-07-05 RX ORDER — ZOLPIDEM TARTRATE 5 MG/1
10 TABLET ORAL
Status: DISCONTINUED | OUTPATIENT
Start: 2025-07-05 | End: 2025-07-08 | Stop reason: HOSPADM

## 2025-07-05 RX ORDER — ONDANSETRON 2 MG/ML
4 INJECTION INTRAMUSCULAR; INTRAVENOUS ONCE
Status: COMPLETED | OUTPATIENT
Start: 2025-07-05 | End: 2025-07-05

## 2025-07-05 RX ORDER — NEOMYCIN SULFATE 500 MG/1
500 TABLET ORAL 2 TIMES DAILY
Status: DISCONTINUED | OUTPATIENT
Start: 2025-07-05 | End: 2025-07-08 | Stop reason: HOSPADM

## 2025-07-05 RX ORDER — LORAZEPAM 0.5 MG/1
0.5 TABLET ORAL EVERY 8 HOURS PRN
Status: DISCONTINUED | OUTPATIENT
Start: 2025-07-05 | End: 2025-07-08 | Stop reason: HOSPADM

## 2025-07-05 RX ORDER — LEVOTHYROXINE SODIUM 75 UG/1
75 TABLET ORAL DAILY
Status: DISCONTINUED | OUTPATIENT
Start: 2025-07-05 | End: 2025-07-08 | Stop reason: HOSPADM

## 2025-07-05 RX ORDER — HYDROMORPHONE HCL IN WATER/PF 6 MG/30 ML
0.2 PATIENT CONTROLLED ANALGESIA SYRINGE INTRAVENOUS ONCE
Status: COMPLETED | OUTPATIENT
Start: 2025-07-05 | End: 2025-07-05

## 2025-07-05 RX ORDER — FAMOTIDINE 20 MG/1
20 TABLET, FILM COATED ORAL DAILY
Status: DISCONTINUED | OUTPATIENT
Start: 2025-07-05 | End: 2025-07-08 | Stop reason: HOSPADM

## 2025-07-05 RX ORDER — SODIUM CHLORIDE, SODIUM GLUCONATE, SODIUM ACETATE, POTASSIUM CHLORIDE, MAGNESIUM CHLORIDE, SODIUM PHOSPHATE, DIBASIC, AND POTASSIUM PHOSPHATE .53; .5; .37; .037; .03; .012; .00082 G/100ML; G/100ML; G/100ML; G/100ML; G/100ML; G/100ML; G/100ML
75 INJECTION, SOLUTION INTRAVENOUS CONTINUOUS
Status: DISCONTINUED | OUTPATIENT
Start: 2025-07-05 | End: 2025-07-08

## 2025-07-05 RX ORDER — LORAZEPAM 0.5 MG/1
0.5 TABLET ORAL ONCE
Status: COMPLETED | OUTPATIENT
Start: 2025-07-05 | End: 2025-07-05

## 2025-07-05 RX ORDER — VENLAFAXINE HYDROCHLORIDE 37.5 MG/1
37.5 CAPSULE, EXTENDED RELEASE ORAL
Status: DISCONTINUED | OUTPATIENT
Start: 2025-07-06 | End: 2025-07-08 | Stop reason: HOSPADM

## 2025-07-05 RX ORDER — SACCHAROMYCES BOULARDII 250 MG
250 CAPSULE ORAL 2 TIMES DAILY
Status: DISCONTINUED | OUTPATIENT
Start: 2025-07-05 | End: 2025-07-08 | Stop reason: HOSPADM

## 2025-07-05 RX ORDER — DICYCLOMINE HYDROCHLORIDE 10 MG/1
10 CAPSULE ORAL 2 TIMES DAILY PRN
Status: DISCONTINUED | OUTPATIENT
Start: 2025-07-05 | End: 2025-07-08 | Stop reason: HOSPADM

## 2025-07-05 RX ORDER — ACETAMINOPHEN 10 MG/ML
1000 INJECTION, SOLUTION INTRAVENOUS ONCE
Status: COMPLETED | OUTPATIENT
Start: 2025-07-05 | End: 2025-07-05

## 2025-07-05 RX ADMIN — ZOLPIDEM TARTRATE 10 MG: 5 TABLET ORAL at 21:46

## 2025-07-05 RX ADMIN — Medication 250 MG: at 17:04

## 2025-07-05 RX ADMIN — NEOMYCIN SULFATE 500 MG: 500 TABLET ORAL at 21:46

## 2025-07-05 RX ADMIN — RIFAXIMIN 550 MG: 550 TABLET ORAL at 21:46

## 2025-07-05 RX ADMIN — RIFAXIMIN 550 MG: 550 TABLET ORAL at 14:01

## 2025-07-05 RX ADMIN — FAMOTIDINE 20 MG: 20 TABLET, FILM COATED ORAL at 12:54

## 2025-07-05 RX ADMIN — SODIUM CHLORIDE, SODIUM GLUCONATE, SODIUM ACETATE, POTASSIUM CHLORIDE, MAGNESIUM CHLORIDE, SODIUM PHOSPHATE, DIBASIC, AND POTASSIUM PHOSPHATE 75 ML/HR: .53; .5; .37; .037; .03; .012; .00082 INJECTION, SOLUTION INTRAVENOUS at 12:58

## 2025-07-05 RX ADMIN — HYDROMORPHONE HYDROCHLORIDE 0.2 MG: 0.2 INJECTION, SOLUTION INTRAMUSCULAR; INTRAVENOUS; SUBCUTANEOUS at 08:34

## 2025-07-05 RX ADMIN — ACETAMINOPHEN 1000 MG: 10 INJECTION INTRAVENOUS at 08:37

## 2025-07-05 RX ADMIN — ONDANSETRON 4 MG: 2 INJECTION, SOLUTION INTRAMUSCULAR; INTRAVENOUS at 16:06

## 2025-07-05 RX ADMIN — NEOMYCIN SULFATE 500 MG: 500 TABLET ORAL at 12:54

## 2025-07-05 RX ADMIN — ONDANSETRON 4 MG: 2 INJECTION, SOLUTION INTRAMUSCULAR; INTRAVENOUS at 06:55

## 2025-07-05 RX ADMIN — Medication 2.5 MG: at 13:19

## 2025-07-05 RX ADMIN — LORAZEPAM 0.5 MG: 0.5 TABLET ORAL at 07:32

## 2025-07-05 RX ADMIN — Medication 250 MG: at 12:54

## 2025-07-05 RX ADMIN — LORATADINE 10 MG: 10 TABLET ORAL at 12:54

## 2025-07-05 RX ADMIN — LORAZEPAM 0.5 MG: 0.5 TABLET ORAL at 21:46

## 2025-07-05 NOTE — ASSESSMENT & PLAN NOTE
Recently diagnosed after positive breath test.   She was started on Neomycin and Rifaximin on day 9 of 14 day course. Will continue until July 10 to finish the course

## 2025-07-05 NOTE — ED PROVIDER NOTES
Time reflects when diagnosis was documented in both MDM as applicable and the Disposition within this note       Time User Action Codes Description Comment    7/5/2025  9:23 AM Simeon Felder Add [R10.13] Epigastric pain     7/5/2025  9:23 AM Simeon Felder [R11.0] Nausea           ED Disposition       ED Disposition   Admit    Condition   Stable    Date/Time   Sat Jul 5, 2025  9:23 AM    Comment   Case was discussed with DESIREE and the patient's admission status was agreed to be Admission Status: inpatient status to the service of Dr. Mejia .               Assessment & Plan       Medical Decision Making  60 year old female, recent diagnosis of SIBO, presented with persistent nausea and epigastric pain. Also reported weight loss since diagnosis and dietary changes. Tender epigastrum/RUQ on examination, no guarding. Differential included ACS, gastritis, pancreatitis, hepatitis, bowel obstruction. Bloods and EKG reassuring for ACS, bloods and CT imaging reassuring for differential. Symptoms not settled with analgesia (acetaminophen and oxymorphone, ondansetron). Discussed discharge vs admission with patient, patient preferred admission due to symptom severity. Referred for inpatient observation, symptom management, potential GI consult.     Amount and/or Complexity of Data Reviewed  Labs: ordered.  Radiology: ordered.    Risk  Prescription drug management.  Decision regarding hospitalization.             Medications   ondansetron (ZOFRAN) injection 4 mg (4 mg Intravenous Given 7/5/25 0655)   LORazepam (ATIVAN) tablet 0.5 mg (0.5 mg Oral Given 7/5/25 0732)   acetaminophen (Ofirmev) injection 1,000 mg (0 mg Intravenous Stopped 7/5/25 0852)   HYDROmorphone HCl (DILAUDID) injection 0.2 mg (0.2 mg Intravenous Given 7/5/25 0834)       ED Risk Strat Scores   HEART Risk Score      Flowsheet Row Most Recent Value   Heart Score Risk Calculator    History 0 Filed at: 07/05/2025 0818   ECG 0 Filed at: 07/05/2025 0818   Age 1  Filed at: 07/05/2025 0818   Risk Factors 1 Filed at: 07/05/2025 0818   Troponin 0 Filed at: 07/05/2025 0818   HEART Score 2 Filed at: 07/05/2025 0818          HEART Risk Score      Flowsheet Row Most Recent Value   Heart Score Risk Calculator    History 0 Filed at: 07/05/2025 0818   ECG 0 Filed at: 07/05/2025 0818   Age 1 Filed at: 07/05/2025 0818   Risk Factors 1 Filed at: 07/05/2025 0818   Troponin 0 Filed at: 07/05/2025 0818   HEART Score 2 Filed at: 07/05/2025 0818                      No data recorded                            History of Present Illness       Chief Complaint   Patient presents with    Abdominal Pain     Pt being treated for SIBO. Started w nausea/epigastric pain, no vomiting  overnight. Pt reports losing 12 lbs since she started her medications last week.        Past Medical History[1]   Past Surgical History[2]   Family History[3]   Social History[4]   E-Cigarette/Vaping    E-Cigarette Use Never User       E-Cigarette/Vaping Substances    Nicotine No     THC No     CBD No     Flavoring No     Other No     Unknown No       I have reviewed and agree with the history as documented.     60 year old female self presented with nausea.     Diagnosis of SIBO, has started xifaxan and neomycin 06/26/25   Initially tolerated well     Developed loose stool on Tuesday, bowels have opened 3 times in the last 24 hours   Initially watery but now more formed  No blood/mucous    Nausea overnight, no vomiting     Reports 10-12 lbs of unintentional weight loss over several weeks  Has been eating small amounts to help with medications as they irritate     Reports some burning when passing urine, associated with peppermint tablets to help with symptoms     No recent temperatures  No difficulty breathing  No chest pain      Abdominal Pain  Associated symptoms: diarrhea and nausea    Associated symptoms: no chest pain, no fever and no vomiting        Review of Systems   Constitutional:  Positive for unexpected  weight change. Negative for fever.   Respiratory: Negative.     Cardiovascular:  Negative for chest pain.   Gastrointestinal:  Positive for abdominal pain, diarrhea and nausea. Negative for vomiting.   All other systems reviewed and are negative.          Objective       ED Triage Vitals   Temperature Pulse Blood Pressure Respirations SpO2 Patient Position - Orthostatic VS   07/05/25 0631 07/05/25 0631 07/05/25 0631 07/05/25 0631 07/05/25 0631 07/05/25 0631   97.7 °F (36.5 °C) 95 (!) 189/90 18 99 % Lying      Temp Source Heart Rate Source BP Location FiO2 (%) Pain Score    07/05/25 0631 07/05/25 0631 07/05/25 0631 -- 07/05/25 0834    Oral Monitor Right arm  6      Vitals      Date and Time Temp Pulse SpO2 Resp BP Pain Score FACES Pain Rating User   07/05/25 0834 -- -- -- -- -- 6 -- CS   07/05/25 0810 -- 82 99 % 17 177/81 -- -- CS   07/05/25 0631 97.7 °F (36.5 °C) 95 99 % 18 189/90 -- -- MM            Physical Exam  Abdominal:      General: Abdomen is flat. Bowel sounds are normal. There is no distension.      Palpations: Abdomen is soft.      Tenderness: There is abdominal tenderness in the right upper quadrant and epigastric area.         Results Reviewed       Procedure Component Value Units Date/Time    UA w Reflex to Microscopic w Reflex to Culture [[id number]] Collected: 07/05/25 0809    Lab Status: Final result Specimen: Urine, Other Updated: 07/05/25 0823     Color, UA Colorless     Clarity, UA Clear     Specific Gravity, UA 1.004     pH, UA 8.0     Leukocytes, UA Negative     Nitrite, UA Negative     Protein, UA Negative mg/dl      Glucose, UA Negative mg/dl      Ketones, UA Negative mg/dl      Urobilinogen, UA <2.0 mg/dl      Bilirubin, UA Negative     Occult Blood, UA Negative    HS Troponin 0hr (reflex protocol) [[id number]]  (Normal) Collected: 07/05/25 0704    Lab Status: Final result Specimen: Blood from Arm, Left Updated: 07/05/25 0731     hs TnI 0hr 3 ng/L     Comprehensive metabolic panel [[id number]]  Collected: 07/05/25 0704    Lab Status: Final result Specimen: Blood from Arm, Left Updated: 07/05/25 0723     Sodium 140 mmol/L      Potassium 3.9 mmol/L      Chloride 102 mmol/L      CO2 31 mmol/L      ANION GAP 7 mmol/L      BUN 15 mg/dL      Creatinine 0.91 mg/dL      Glucose 107 mg/dL      Calcium 9.8 mg/dL      AST 30 U/L      ALT 25 U/L      Alkaline Phosphatase 61 U/L      Total Protein 7.5 g/dL      Albumin 5.0 g/dL      Total Bilirubin 0.55 mg/dL      eGFR 68 ml/min/1.73sq m     Narrative:      [employer] guidelines for Chronic Kidney Disease (CKD):     Stage 1 with normal or high GFR (GFR > 90 mL/min/1.73 square meters)    Stage 2 Mild CKD (GFR = 60-89 mL/min/1.73 square meters)    Stage 3A Moderate CKD (GFR = 45-59 mL/min/1.73 square meters)    Stage 3B Moderate CKD (GFR = 30-44 mL/min/1.73 square meters)    Stage 4 Severe CKD (GFR = 15-29 mL/min/1.73 square meters)    Stage 5 End Stage CKD (GFR <15 mL/min/1.73 square meters)  Note: GFR calculation is accurate only with a steady state creatinine    Lipase [[id number]]  (Normal) Collected: 07/05/25 0704    Lab Status: Final result Specimen: Blood from Arm, Left Updated: 07/05/25 0723     Lipase 38 u/L     CBC and differential [[id number]]  (Abnormal) Collected: 07/05/25 0704    Lab Status: Final result Specimen: Blood from Arm, Left Updated: 07/05/25 0710     WBC 4.97 Thousand/uL      RBC 4.98 Million/uL      Hemoglobin 15.6 g/dL      Hematocrit 46.2 %      MCV 93 fL      MCH 31.3 pg      MCHC 33.8 g/dL      RDW 12.5 %      MPV 10.3 fL      Platelets 214 Thousands/uL      nRBC 0 /100 WBCs      Segmented % 66 %      Immature Grans % 0 %      Lymphocytes % 29 %      Monocytes % 5 %      Eosinophils Relative 0 %      Basophils Relative 0 %      Absolute Neutrophils 3.23 Thousands/µL      Absolute Immature Grans 0.01 Thousand/uL      Absolute Lymphocytes 1.42 Thousands/µL      Absolute Monocytes 0.27 Thousand/µL      Eosinophils Absolute  0.02 Thousand/µL      Basophils Absolute 0.02 Thousands/µL             CT abdomen pelvis wo contrast   Final Interpretation by Wes Cardenas MD (07/05 0848)      Punctate nonobstructing left renal calculus. Mildly prominent right extrarenal pelvis. Punctate 2 mm calcification seen in close association with the distal right ureter. This may either reflect a punctate distal right ureteral calculus without upstream    hydroureter or hydronephrosis or more likely represents an adjacent calcified phlebolith.      No bowel obstruction or focal inflammatory process. Colonic diverticulosis without acute diverticulitis.      Additional findings as above.      Workstation performed: [id number]             ECG 12 Lead Documentation Only    Date/Time: 7/5/2025 7:26 AM    Performed by: Simeon Felder MD  Authorized by: Simeon Felder MD    Indications / Diagnosis:  Eval for ischemia  ECG reviewed by me, the ED Provider: yes    Patient location:  ED  Previous ECG:     Previous ECG:  Unavailable  Interpretation:     Interpretation: abnormal    Rate:     ECG rate:  77    ECG rate assessment: normal    Rhythm:     Rhythm: sinus rhythm    Ectopy:     Ectopy: none    QRS:     QRS axis:  Left  Conduction:     Conduction: abnormal      Abnormal conduction: complete LBBB    ST segments:     ST segments:  Normal  T waves:     T waves: normal        ED Medication and Procedure Management   Prior to Admission Medications   Prescriptions Last Dose Informant Patient Reported? Taking?   LORazepam (ATIVAN) 0.5 mg tablet  Self No No   Sig: Take 1 tablet every 8 hours as needed.   Peppermint Oil (IBgard) 90 MG CPCR   No No   Sig: Take one capsule by mouth three times daily as needed for IBS symptoms   Probiotic Product (PROBIOTIC PO)  Self Yes No   Sig: Take by mouth   Synthroid 75 MCG tablet  Self No No   Sig: TAKE 1 TABLET DAILY   dicyclomine (BENTYL) 10 mg capsule   No No   Sig: TAKE 1 CAPSULE (10 MG TOTAL) BY MOUTH TWICE A DAY AS  NEEDED FOR ABDOMINAL PAIN   docusate sodium (COLACE) 100 mg capsule  Self No No   Sig: One capsule by mouth every other   Patient taking differently: 100 mg in the morning One capsule by mouth every other   famotidine (PEPCID) 40 MG tablet  Self No No   Sig: Take 1 tablet (40 mg total) by mouth 2 (two) times a day   fexofenadine (Allegra Allergy) 180 MG tablet  Self Yes No   Sig: Take 180 mg by mouth in the morning.   neomycin (MYCIFRADIN) 500 mg tablet   No No   Sig: Take 1 tablet (500 mg total) by mouth 2 (two) times a day for 14 days   ondansetron (ZOFRAN) 8 mg tablet  Self No No   Sig: Take 1 tablet (8 mg total) by mouth every 8 (eight) hours as needed for nausea or vomiting   propranolol (INDERAL) 40 mg tablet  Self No No   Sig: Take 1 tablet (40 mg total) by mouth 2 (two) times a day   Patient taking differently: Take 40 mg by mouth if needed   rifaximin (XIFAXAN) 550 mg tablet   No No   Sig: Take 1 tablet (550 mg total) by mouth every 8 (eight) hours for 14 days   saccharomyces boulardii (FLORASTOR) 250 mg capsule   No No   Sig: Take 1 capsule (250 mg total) by mouth 2 (two) times a day   venlafaxine (EFFEXOR-XR) 37.5 mg 24 hr capsule  Self No No   Sig: Take 1 capsule (37.5 mg total) by mouth daily with breakfast   zolpidem (AMBIEN) 10 mg tablet  Self Yes No   Sig: Take 10 mg by mouth if needed for sleep      Facility-Administered Medications: None     Patient's Medications   Discharge Prescriptions    No medications on file     No discharge procedures on file.  ED SEPSIS DOCUMENTATION   Time reflects when diagnosis was documented in both MDM as applicable and the Disposition within this note       Time User Action Codes Description Comment    7/5/2025  9:23 AM Simeon Felder Add [R10.13] Epigastric pain     7/5/2025  9:23 AM Simeon Felder [R11.0] Nausea                    Simeon Felder MD  07/05/25 0728         [1]   Past Medical History:  Diagnosis Date    Allergic     Seasonal    Allergic rhinitis      As a child    Anxiety     BBB (bundle branch block)     left- monitored by PCP-saw cardiologist in past, had workup-no problems 15 years ago    Clotting disorder (HCC)     Platelet disorder-idiopathic    Colon polyp     Depression     Disease of thyroid gland     Hypothyroidism    Ear problems     Tubes over the yrs    Fatty liver     Fibromyalgia, primary     Gastric ulcer     GERD (gastroesophageal reflux disease)     Hiatal hernia     Irritable bowel syndrome     Kidney stone     Palpitation     occasional    Sleep apnea     Sleep difficulties     Insomia    TMJ (dislocation of temporomandibular joint)     cannot keep mouth open for long periods of time, wears mouth guard at bedtime   [2]   Past Surgical History:  Procedure Laterality Date    ABDOMINAL SURGERY      APPENDECTOMY      CHOLECYSTECTOMY      COLONOSCOPY      EGD      GALLBLADDER SURGERY      HYSTERECTOMY  2010    NASAL SEPTUM SURGERY      OOPHORECTOMY  2010    SINUS SURGERY      As a child-deviated septum    TYMPANOSTOMY TUBE PLACEMENT      UPPER GASTROINTESTINAL ENDOSCOPY     [3]   Family History  Problem Relation Name Age of Onset    Heart attack Mother Enedina     Stroke Mother Enedina     Hypertension Mother Enedina     Hyperlipidemia Mother Enedina     Heart disease Mother Enedina     Colon cancer Father coleman 70    Diabetes Father coleman     Cancer Father coleman         Colon    Colon polyps Father coleman     Hyperlipidemia Father coleman     Hypertension Father coleman     Hypothyroidism Father coleman     Birth defects Sister Candy         Spina bifida    Irritable bowel syndrome Sister Candy     No Known Problems Daughter      No Known Problems Maternal Grandmother      Prostate cancer Maternal Grandfather Sina         age dx unknown    Breast cancer Paternal Grandmother rios         age dx unknown    No Known Problems Paternal Grandfather      No Known Problems Maternal Aunt      No Known Problems Maternal Aunt      No  Known Problems Paternal Aunt      COPD Sister Anita     Hearing loss Sister Anita     Crohn's disease Cousin Alicja         Actually my Niece, not a cousin    Ulcerative colitis Sister Perry    [4]   Social History  Tobacco Use    Smoking status: Never    Smokeless tobacco: Never   Vaping Use    Vaping status: Never Used   Substance Use Topics    Alcohol use: Not Currently    Drug use: Never        Simeon Felder MD  07/05/25 0992

## 2025-07-05 NOTE — ASSESSMENT & PLAN NOTE
Patient has had longstanding history of abdominal pain mostly in epigastric area , worse after eating, associated with 10 to 12 pound weight loss since the onset.  Worsening pain and nausea last night prompted her ED visit  Extensive GI work up including normal esophageal manometry, EGD in 2024 mild gastric erosion overall unremarkable with normal biopsies.  Colonoscopy in 2024 diverticulosis.  Celiac disease workup negative by serology and biopsy. Recent breast test positive leading to the diagnosis of SIBO, however no symptomatic improvement yet after receiving 9 days of 14 day course treatment  Lipase nl.    Irritable bowel syndrome with intermittent constipation diarrhea per record.  She did have increasing stress and anxiety from recent selling her house and moving.  Her weight loss however is concerning not consistent with typical irritable bowel syndrome  Will obtain mesenteric duplex given postprandial abd pain and symptoms.   Will proceed with stool studies (O&P, enteric bacterial and C. Difficile) that was ordered by GI outpatient  Will appreciate GI evaluation  Supportive care/IV fluid for dehydration/continue Bentyl as needed. Continue pepcid for h/o GERD

## 2025-07-05 NOTE — H&P
H&P - Hospitalist   Name: Divina Perales 60 y.o. female I MRN: [id number]  Unit/Bed#: ED-32 I Date of Admission: 7/5/2025   Date of Service: 7/5/2025 I Hospital Day: 0     Assessment & Plan  Acute on chronic abdominal pain and bloating  Patient has had longstanding history of abdominal pain mostly in epigastric area , worse after eating, associated with 10 to 12 pound weight loss since the onset.  Worsening pain and nausea last night prompted her ED visit  Extensive GI work up including normal esophageal manometry, EGD in 2024 mild gastric erosion overall unremarkable with normal biopsies.  Colonoscopy in 2024 diverticulosis.  Celiac disease workup negative by serology and biopsy. Recent breast test positive leading to the diagnosis of SIBO, however no symptomatic improvement yet after receiving 9 days of 14 day course treatment  Lipase nl.    Irritable bowel syndrome with intermittent constipation diarrhea per record.  She did have increasing stress and anxiety from recent selling her house and moving.  Her weight loss however is concerning not consistent with typical irritable bowel syndrome  Will obtain mesenteric duplex given postprandial abd pain and symptoms.   Will proceed with stool studies (O&P, enteric bacterial and C. Difficile) that was ordered by GI outpatient  Will appreciate GI evaluation  Supportive care/IV fluid for dehydration/continue Bentyl as needed. Continue pepcid for h/o GERD  Small intestinal bacterial overgrowth (SIBO)  Recently diagnosed after positive breath test.   She was started on Neomycin and Rifaximin on day 9 of 14 day course. Will continue until July 10 to finish the course   Anxiety  She takes Xanax as needed at home.   Elevated blood pressure reading  BP on arrival 189/90.  Patient does not have h/o hypertension.  Likely due to anxiety.  She reported having panic attack this morning  Takes propranolol prn occasionally for anxiety only  Current /78  Monitor BP  Abnormal CT of  the abdomen  Noncontrast CT abdomen showed punctate 2 mm calcification in the distal right ureter with no associated upstream hydroureteral nephrosis.   Patient reports no flank pain.  Likely this is an incidental finding and not related to her symptomology.         VTE Pharmacologic Prophylaxis: VTE Score: 2 Low Risk (Score 0-2) - Encourage Ambulation.  Code Status: Full code  Discussion with family:     Anticipated Length of Stay: Patient will be admitted on an inpatient basis with an anticipated length of stay of greater than 2 midnights secondary to GI workup and evaluation, hydration monitor for intake.    History of Present Illness   Chief Complaint: Abd pain and bloating    Divina Perales is a 60 y.o. female with a PMH of GERD, mesenteric pannicultitis, colon polyps and family history of colon cancer, possible irritable bowel syndrome, and anxiety who presents with acute on chronic abdominal pain and bloating.  Patient has had multiple GI visits 2/2 ongoing symptoms of abdominal pain mostly in the epigastric area that worsens after eating, associated with bloating and indigestion, and 10-12 pound weight loss.  She has been on low- inflammatory diet.  She has had extensive workup including esophageal manometry, EGD and colonoscopy are overall nonrevealing.   She was recently diagnosed with small bowel bacterial growth after positive carbohydrate breath test.  Was started on neomycin and rifaximin that she is taking 9 days of 14-day course.  It has not provided any improvement so far.     She experienced worsening epigastric pain associate with nausea since last night.  Poor appetite.  Because lack of improvement after recent treatment for SIBO and worsening symptoms last 24 hours she came to the ED. Denies hematemesis or bloody stool. BP elevated on arrival    Review of Systems   Constitutional:  Positive for appetite change, fatigue and unexpected weight change. Negative for fever.   Respiratory:  Negative for  cough and shortness of breath.    Cardiovascular:  Negative for palpitations.   Gastrointestinal:  Positive for abdominal pain, diarrhea and nausea.   Neurological:  Negative for seizures and headaches.   All other systems reviewed and are negative.      Historical Information   Past Medical History[1]  Past Surgical History[2]  Social History[3]  E-Cigarette/Vaping    E-Cigarette Use Never User      E-Cigarette/Vaping Substances    Nicotine No     THC No     CBD No     Flavoring No     Other No     Unknown No        Social History:  Marital Status:    Occupation:   Patient Pre-hospital Living Situation: Home  Patient Pre-hospital Level of Mobility: walks  Patient Pre-hospital Diet Restrictions:     Meds/Allergies     Prior to Admission medications    Medication Sig Start Date End Date Taking? Authorizing Provider   dicyclomine (BENTYL) 10 mg capsule TAKE 1 CAPSULE (10 MG TOTAL) BY MOUTH TWICE A DAY AS NEEDED FOR ABDOMINAL PAIN 3/11/25   Javon Eisenberg MD   docusate sodium (COLACE) 100 mg capsule One capsule by mouth every other  Patient taking differently: 100 mg in the morning One capsule by mouth every other 11/4/21   Mark Anthony Ramirez MD   famotidine (PEPCID) 40 MG tablet Take 1 tablet (40 mg total) by mouth 2 (two) times a day 7/29/24   Javon Eisenberg MD   fexofenadine (Allegra Allergy) 180 MG tablet Take 180 mg by mouth in the morning. 1/1/24   Historical Provider, MD   LORazepam (ATIVAN) 0.5 mg tablet Take 1 tablet every 8 hours as needed. 12/6/23   Waqar Elam DO   neomycin (MYCIFRADIN) 500 mg tablet Take 1 tablet (500 mg total) by mouth 2 (two) times a day for 14 days 6/26/25 7/10/25  Monet Bustos PA-C   ondansetron (ZOFRAN) 8 mg tablet Take 1 tablet (8 mg total) by mouth every 8 (eight) hours as needed for nausea or vomiting 6/13/22   Waqar Elam DO   Peppermint Oil (IBgard) 90 MG CPCR Take one capsule by mouth three times daily as needed for IBS symptoms 7/3/25    Monet Bustos PA-C   Probiotic Product (PROBIOTIC PO) Take by mouth    Historical Provider, MD   propranolol (INDERAL) 40 mg tablet Take 1 tablet (40 mg total) by mouth 2 (two) times a day  Patient taking differently: Take 40 mg by mouth if needed 1/20/23   Waqar Elam DO   rifaximin (XIFAXAN) 550 mg tablet Take 1 tablet (550 mg total) by mouth every 8 (eight) hours for 14 days 6/26/25 7/10/25  Monet Bustos PA-C   saccharomyces boulardii (FLORASTOR) 250 mg capsule Take 1 capsule (250 mg total) by mouth 2 (two) times a day 7/3/25   Monet Bustos PA-C   Synthroid 75 MCG tablet TAKE 1 TABLET DAILY 7/30/23   Heber Cody DO   venlafaxine (EFFEXOR-XR) 37.5 mg 24 hr capsule Take 1 capsule (37.5 mg total) by mouth daily with breakfast 1/20/23   Waqar Elam DO   zolpidem (AMBIEN) 10 mg tablet Take 10 mg by mouth if needed for sleep 1/24/24   Historical Provider, MD     Allergies   Allergen Reactions    Aspirin Other (See Comments)     Reaction Date: 08Jun2011;   Patient has platelet disorder        Erythromycin Base Other (See Comments)     rash    Iodine - Food Allergy Other (See Comments) and Vomiting     ivp dye    Morphine Headache and Other (See Comments)     migraine headaches      Prochlorperazine Other (See Comments)     Lower half of body went numb    Quinolones Abdominal Pain     tendinitis    Metoclopramide Anxiety, Other (See Comments) and Rash     Reaction Date: 08Jun2011;       Tetracycline Rash and Other (See Comments)     Reaction Date: 08Jun2011;          Objective :  Temp:  [97.7 °F (36.5 °C)] 97.7 °F (36.5 °C)  HR:  [72-95] 72  BP: (146-189)/(78-90) 146/78  Resp:  [16-18] 16  SpO2:  [99 %-100 %] 100 %  O2 Device: None (Room air)    Physical Exam  Constitutional:       General: She is not in acute distress.     Appearance: She is not ill-appearing, toxic-appearing or diaphoretic.     Eyes:      General:         Right eye: No discharge.         Left eye: No discharge.  "      Cardiovascular:      Rate and Rhythm: Normal rate and regular rhythm.   Pulmonary:      Effort: Pulmonary effort is normal. No respiratory distress.   Abdominal:      General: There is no distension.      Palpations: Abdomen is soft.      Tenderness: There is abdominal tenderness in the epigastric area.     Skin:     General: Skin is warm and dry.     Neurological:      Mental Status: She is oriented to person, place, and time.     Psychiatric:         Attention and Perception: Attention normal.         Mood and Affect: Mood is anxious.         Behavior: Behavior normal.          Lines/Drains:            Lab Results: I have reviewed the following results:  Results from last 7 days   Lab Units 07/05/25  0704   WBC Thousand/uL 4.97   HEMOGLOBIN g/dL 15.6*   HEMATOCRIT % 46.2*   PLATELETS Thousands/uL 214   SEGS PCT % 66   LYMPHO PCT % 29   MONO PCT % 5   EOS PCT % 0     Results from last 7 days   Lab Units 07/05/25  0704   SODIUM mmol/L 140   POTASSIUM mmol/L 3.9   CHLORIDE mmol/L 102   CO2 mmol/L 31   BUN mg/dL 15   CREATININE mg/dL 0.91   ANION GAP mmol/L 7   CALCIUM mg/dL 9.8   ALBUMIN g/dL 5.0   TOTAL BILIRUBIN mg/dL 0.55   ALK PHOS U/L 61   ALT U/L 25   AST U/L 30   GLUCOSE RANDOM mg/dL 107             No results found for: \"HGBA1C\"          Other Study Results Review: Pathology reports reviewed.    Administrative Statements       ** Please Note: This note has been constructed using a voice recognition system. **         [1]   Past Medical History:  Diagnosis Date    Allergic     Seasonal    Allergic rhinitis     As a child    Anxiety     BBB (bundle branch block)     left- monitored by PCP-saw cardiologist in past, had workup-no problems 15 years ago    Clotting disorder (HCC)     Platelet disorder-idiopathic    Colon polyp     Depression     Disease of thyroid gland     Hypothyroidism    Ear problems     Tubes over the yrs    Fatty liver     Fibromyalgia, primary     Gastric ulcer     GERD " (gastroesophageal reflux disease)     Hiatal hernia     Irritable bowel syndrome     Kidney stone     Palpitation     occasional    Sleep apnea     Sleep difficulties     Insomia    TMJ (dislocation of temporomandibular joint)     cannot keep mouth open for long periods of time, wears mouth guard at bedtime   [2]   Past Surgical History:  Procedure Laterality Date    ABDOMINAL SURGERY      APPENDECTOMY      CHOLECYSTECTOMY      COLONOSCOPY      EGD      GALLBLADDER SURGERY      HYSTERECTOMY  2010    NASAL SEPTUM SURGERY      OOPHORECTOMY  2010    SINUS SURGERY      As a child-deviated septum    TYMPANOSTOMY TUBE PLACEMENT      UPPER GASTROINTESTINAL ENDOSCOPY     [3]   Social History  Tobacco Use    Smoking status: Never    Smokeless tobacco: Never   Vaping Use    Vaping status: Never Used   Substance and Sexual Activity    Alcohol use: Not Currently    Drug use: Never    Sexual activity: Not Currently     Partners: Male     Birth control/protection: Post-menopausal

## 2025-07-05 NOTE — ASSESSMENT & PLAN NOTE
Noncontrast CT abdomen showed punctate 2 mm calcification in the distal right ureter with no associated upstream hydroureteral nephrosis.   Patient reports no flank pain.  Likely this is an incidental finding and not related to her symptomology.

## 2025-07-05 NOTE — ED ATTENDING ATTESTATION
7/5/2025  ILissett MD, saw and evaluated the patient. I have discussed the patient with the resident/non-physician practitioner and agree with the resident's/non-physician practitioner's findings, Plan of Care, and MDM as documented in the resident's/non-physician practitioner's note, except where noted. All available labs and Radiology studies were reviewed.  I was present for key portions of any procedure(s) performed by the resident/non-physician practitioner and I was immediately available to provide assistance.       At this point I agree with the current assessment done in the Emergency Department.  I have conducted an independent evaluation of this patient a history and physical is as follows:    60-year-old female with a history of SIBO, anxiety, GERD, osteoporosis presenting for evaluation of nausea, diarrhea, and epigastric pain.  Patient states she was diagnosed with SIBO and has been initiated on antibiotics.  Is currently taking neomycin and Xifaxan.  On Tuesday, patient developed watery diarrhea without blood or mucus.  Stools are still loose but more formed.  She is also taking Florastor. Overnight to today, patient developed nausea and worsening midepigastric pain with radiation to the right upper quadrant.  Patient states pain feels similar to previous episode of pancreatitis she had several years ago.  States at that time it was due to a gallstone and a stricture and patient had a cholecystectomy.  Patient has also lost 10 to 12 pounds over the last 2 months but states she significantly altered her diet after her diagnosis.  Has dysuria, as well, which patient attributes to the peppermint oil in her IBgard.  Denies fever, chills, upper respite symptoms, chest pain, shortness of breath, vomiting.  Also notes a history of hysterectomy and exploratory surgery.  Did not take anything for her symptoms at home.  Stool cultures were ordered by GI but she did not yet perform  them.    Please see resident documentation for histories and review of systems.    Exam: Vital signs and nursing notes reviewed  General: Awake, alert, uncomfortable and anxious  HEENT: Normocephalic, atraumatic, mucous membranes moist  Neck: Supple  Heart: Regular rate and rhythm  Lungs: Clear to auscultation bilaterally without wheezes, rales, and rhonchi  Abdomen: Soft, nondistended, tenderness to palpation in the midepigastric and right upper quadrant region without rebound or guarding  Extremities: No swelling  Skin: Warm, dry, intact, rash    EKG: Reviewed by myself and the resident physician: Normal sinus rhythm with left bundle branch block with no previous EKGs available; no evidence of acute ischemia or malignant dysrhythmia    ED Course  ED Course as of 07/05/25 0959   Sat Jul 05, 2025   0711 WBC: 4.97   0712 Hemoglobin(!): 15.6   0712 Platelet Count: 214   0728 LIPASE: 38   0728 Comprehensive metabolic panel  Grossly normal   0734 hs TnI 0hr: 3   0754 Patient reevaluated; still anxious and uncomfortable.  Notes a pressure in her upper abdomen.  Offered analgesia but declined.  Discussed laboratory results and EKG.  Pending CT interpretation by radiology   0824 UA w Reflex to Microscopic w Reflex to Culture  negative   0831 Patient with increasing pain and uncomfortable on reevaluation; will give IV acetaminophen as she is unable to tolerate p.o. intake and a small dose of hydromorphone   0859 CT abdomen pelvis wo contrast  IMPRESSION:     Punctate nonobstructing left renal calculus. Mildly prominent right extrarenal pelvis. Punctate 2 mm calcification seen in close association with the distal right ureter. This may either reflect a punctate distal right ureteral calculus without upstream   hydroureter or hydronephrosis or more likely represents an adjacent calcified phlebolith.     No bowel obstruction or focal inflammatory process. Colonic diverticulosis without acute diverticulitis.       ED  course/Medical Decision Makin-year-old female presenting for evaluation of nausea and midepigastric pain.  Differential diagnosis includes acute coronary syndrome, gastritis, pancreatitis, hepatitis, gastroenteritis, SIBO, GERD, small bowel obstruction, diverticulitis, nephrolithiasis.  Laboratory studies show grossly normal CBC, CMP, and lipase.  EKG shows normal sinus rhythm without evidence of acute ischemia or malignant dysrhythmia; initial troponin is 3.  Low suspicion for ACS at this time.  Heart score is 2; patient is at low risk for major acute cardiac event in next 30 days.  Urinalysis is without evidence of blood or infection.  CT of the abdomen/pelvis shows no acute findings.  Stomach is dilated per my interpretation on CT, which may be contributing to patient's discomfort.  Initially patient declined analgesia but was increasingly uncomfortable in the emergency department.  Given ondansetron in addition to IV acetaminophen and hydromorphone without significant improvement in symptoms.  Patient has not been able to tolerate p.o. intake.  Given symptom severity, discussed discharge home with ondansetron with close outpatient GI follow-up versus admission for continued supportive management and possible GI consultation.  Patient is not comfortable with discharge home at this time.  Discussed with internal medicine, and patient will be admitted to their service for further care.  Patient is in agreement with this plan.    Diagnosis: Nausea, abdominal pain, SIBO, gastric distention  Disposition: Admission

## 2025-07-05 NOTE — ASSESSMENT & PLAN NOTE
BP on arrival 189/90.  Patient does not have h/o hypertension.  Likely due to anxiety.  She reported having panic attack this morning  Takes propranolol prn occasionally for anxiety only  Current /78  Monitor BP

## 2025-07-06 LAB
ANION GAP SERPL CALCULATED.3IONS-SCNC: 5 MMOL/L (ref 4–13)
BASOPHILS # BLD MANUAL: 0.04 THOUSAND/UL (ref 0–0.1)
BASOPHILS NFR MAR MANUAL: 1 % (ref 0–1)
BUN SERPL-MCNC: 11 MG/DL (ref 5–25)
CALCIUM SERPL-MCNC: 8.4 MG/DL (ref 8.4–10.2)
CHLORIDE SERPL-SCNC: 107 MMOL/L (ref 96–108)
CO2 SERPL-SCNC: 26 MMOL/L (ref 21–32)
CREAT SERPL-MCNC: 0.86 MG/DL (ref 0.6–1.3)
EOSINOPHIL # BLD MANUAL: 0 THOUSAND/UL (ref 0–0.4)
EOSINOPHIL NFR BLD MANUAL: 0 % (ref 0–6)
ERYTHROCYTE [DISTWIDTH] IN BLOOD BY AUTOMATED COUNT: 12.5 % (ref 11.6–15.1)
GFR SERPL CREATININE-BSD FRML MDRD: 73 ML/MIN/1.73SQ M
GIANT PLATELETS BLD QL SMEAR: PRESENT
GLUCOSE SERPL-MCNC: 89 MG/DL (ref 65–140)
HCT VFR BLD AUTO: 40.8 % (ref 34.8–46.1)
HGB BLD-MCNC: 13.7 G/DL (ref 11.5–15.4)
LYMPHOCYTES # BLD AUTO: 1.21 THOUSAND/UL (ref 0.6–4.47)
LYMPHOCYTES # BLD AUTO: 27 % (ref 14–44)
MCH RBC QN AUTO: 31.4 PG (ref 26.8–34.3)
MCHC RBC AUTO-ENTMCNC: 33.6 G/DL (ref 31.4–37.4)
MCV RBC AUTO: 94 FL (ref 82–98)
MONOCYTES # BLD AUTO: 0.33 THOUSAND/UL (ref 0–1.22)
MONOCYTES NFR BLD: 8 % (ref 4–12)
NEUTROPHILS # BLD MANUAL: 2.59 THOUSAND/UL (ref 1.85–7.62)
NEUTS SEG NFR BLD AUTO: 62 % (ref 43–75)
PLATELET # BLD AUTO: 177 THOUSANDS/UL (ref 149–390)
PLATELET BLD QL SMEAR: ADEQUATE
PMV BLD AUTO: 10.3 FL (ref 8.9–12.7)
POTASSIUM SERPL-SCNC: 4.3 MMOL/L (ref 3.5–5.3)
RBC # BLD AUTO: 4.36 MILLION/UL (ref 3.81–5.12)
RBC MORPH BLD: NORMAL
SODIUM SERPL-SCNC: 138 MMOL/L (ref 135–147)
TSH SERPL DL<=0.05 MIU/L-ACNC: 2.16 UIU/ML (ref 0.45–4.5)
VARIANT LYMPHS # BLD AUTO: 2 %
VIT B12 SERPL-MCNC: 1822 PG/ML (ref 180–914)
WBC # BLD AUTO: 4.18 THOUSAND/UL (ref 4.31–10.16)

## 2025-07-06 PROCEDURE — 85007 BL SMEAR W/DIFF WBC COUNT: CPT | Performed by: INTERNAL MEDICINE

## 2025-07-06 PROCEDURE — 84443 ASSAY THYROID STIM HORMONE: CPT | Performed by: INTERNAL MEDICINE

## 2025-07-06 PROCEDURE — 80048 BASIC METABOLIC PNL TOTAL CA: CPT | Performed by: INTERNAL MEDICINE

## 2025-07-06 PROCEDURE — 85027 COMPLETE CBC AUTOMATED: CPT | Performed by: INTERNAL MEDICINE

## 2025-07-06 PROCEDURE — 99232 SBSQ HOSP IP/OBS MODERATE 35: CPT

## 2025-07-06 PROCEDURE — 99222 1ST HOSP IP/OBS MODERATE 55: CPT | Performed by: STUDENT IN AN ORGANIZED HEALTH CARE EDUCATION/TRAINING PROGRAM

## 2025-07-06 PROCEDURE — 82607 VITAMIN B-12: CPT | Performed by: INTERNAL MEDICINE

## 2025-07-06 RX ORDER — PANTOPRAZOLE SODIUM 40 MG/10ML
40 INJECTION, POWDER, LYOPHILIZED, FOR SOLUTION INTRAVENOUS
Status: DISCONTINUED | OUTPATIENT
Start: 2025-07-06 | End: 2025-07-08 | Stop reason: HOSPADM

## 2025-07-06 RX ADMIN — LORATADINE 10 MG: 10 TABLET ORAL at 08:57

## 2025-07-06 RX ADMIN — VENLAFAXINE HYDROCHLORIDE 37.5 MG: 37.5 CAPSULE, EXTENDED RELEASE ORAL at 08:57

## 2025-07-06 RX ADMIN — LEVOTHYROXINE SODIUM 75 MCG: 0.07 TABLET ORAL at 08:57

## 2025-07-06 RX ADMIN — RIFAXIMIN 550 MG: 550 TABLET ORAL at 13:14

## 2025-07-06 RX ADMIN — RIFAXIMIN 550 MG: 550 TABLET ORAL at 23:12

## 2025-07-06 RX ADMIN — NEOMYCIN SULFATE 500 MG: 500 TABLET ORAL at 10:20

## 2025-07-06 RX ADMIN — FAMOTIDINE 20 MG: 20 TABLET, FILM COATED ORAL at 08:57

## 2025-07-06 RX ADMIN — RIFAXIMIN 550 MG: 550 TABLET ORAL at 05:42

## 2025-07-06 RX ADMIN — SODIUM CHLORIDE, SODIUM GLUCONATE, SODIUM ACETATE, POTASSIUM CHLORIDE, MAGNESIUM CHLORIDE, SODIUM PHOSPHATE, DIBASIC, AND POTASSIUM PHOSPHATE 75 ML/HR: .53; .5; .37; .037; .03; .012; .00082 INJECTION, SOLUTION INTRAVENOUS at 03:17

## 2025-07-06 RX ADMIN — PANTOPRAZOLE SODIUM 40 MG: 40 INJECTION, POWDER, LYOPHILIZED, FOR SOLUTION INTRAVENOUS at 16:33

## 2025-07-06 RX ADMIN — LORAZEPAM 0.5 MG: 0.5 TABLET ORAL at 08:57

## 2025-07-06 RX ADMIN — ZOLPIDEM TARTRATE 10 MG: 5 TABLET ORAL at 23:20

## 2025-07-06 RX ADMIN — Medication 250 MG: at 18:10

## 2025-07-06 RX ADMIN — NEOMYCIN SULFATE 500 MG: 500 TABLET ORAL at 23:20

## 2025-07-06 RX ADMIN — Medication 250 MG: at 08:57

## 2025-07-06 RX ADMIN — ONDANSETRON 4 MG: 2 INJECTION, SOLUTION INTRAMUSCULAR; INTRAVENOUS at 12:06

## 2025-07-06 RX ADMIN — SODIUM CHLORIDE, SODIUM GLUCONATE, SODIUM ACETATE, POTASSIUM CHLORIDE, MAGNESIUM CHLORIDE, SODIUM PHOSPHATE, DIBASIC, AND POTASSIUM PHOSPHATE 75 ML/HR: .53; .5; .37; .037; .03; .012; .00082 INJECTION, SOLUTION INTRAVENOUS at 16:32

## 2025-07-06 RX ADMIN — LORAZEPAM 0.5 MG: 0.5 TABLET ORAL at 23:20

## 2025-07-06 NOTE — CONSULTS
Consultation - Gastroenterology   Name: Divina Perales 60 y.o. female I MRN: [id number]  Unit/Bed#: S -01 I Date of Admission: 7/5/2025   Date of Service: 7/6/2025 I Hospital Day: 1   Inpatient consult to gastroenterology  Consult performed by: Veena Spivey PA-C  Consult ordered by: Michell Mejia MD      Inpatient consult to gastroenterology  Consult performed by: Veena Spivey PA-C  Consult ordered by: Michell Mejia MD        Physician Requesting Evaluation: Elkin Logan DO   Reason for Evaluation / Principal Problem: Postprandial abdominal pain, decreased appetite, nausea      60-year-old female presents to the emergency room 7/5 for acute on chronic epigastric pain with nausea.  CT unremarkable.  Assessment & Plan  Acute on chronic abdominal pain and bloating  Patient following closely with our GI team last seen in the office a month ago.  No unremarkable workup with EGD and colonoscopy 7/2024 with negative celiac and H. pylori biopsies, CT scan.  Currently being treated for SIBO.  Unclear etiology of symptoms, question possible underlying gastroparesis, less likely peptic ulcer disease or mesenteric ischemia.    - Will plan for upper GI series tomorrow.  - N.p.o. at midnight    - Recommend decreasing to a full liquid diet until symptoms are improving, then can increase.  - Can continue SIBO treatment while here.  - Will start once daily PPI  -Continue Pepcid    - Antiemetics as needed.  - Recommend ambulation    -Recommend outpatient gastric emptying scan  - Reports allergy to Reglan.    I have discussed the above management plan in detail with the primary service.   Gastroenterology service will follow.    History of Present Illness   HPI:  Divina Perales is a 60 y.o. female who presents with AMY, hypothyroidism who presented to the emergency room yesterday for acute on chronic epigastric pain, nausea.  CT scan was performed on admission which was relatively unremarkable from  a GI standpoint.    Patient has been following with our GI team as an outpatient for some chronic GI symptoms.  She was last seen 6/11/2025.  She was diagnosed with SIBO and given Xifaxan/neomycin which she was currently on.  She has previously had unremarkable workup including EGD and colonoscopy 7/2024 including biopsies for celiac and H. pylori, MR enterography 5/2025 and recent CT scan.    Patient describes her symptoms mainly as epigastric discomfort, often postprandial.  She reports a fullness feeling with some nausea.  Rare vomiting.  She reports concern due to weight loss over the last few weeks.  She reports following low FODMAP diet at home.  She reports IBgard often helps.  She previously had pancreatitis and was concerns as this was cause of her symptoms.    Review of Systems   All other systems reviewed and are negative.    Medical History Review: I have reviewed the patient's PMH, PSH, Social History, Family History, Meds, and Allergies     Objective :  Temp:  [97.6 °F (36.4 °C)-97.9 °F (36.6 °C)] 97.9 °F (36.6 °C)  HR:  [70-79] 76  BP: (124-151)/(79-87) 124/79  Resp:  [16-18] 18  SpO2:  [98 %-100 %] 100 %  O2 Device: None (Room air)    Physical Exam  Vitals reviewed.   Constitutional:       General: She is not in acute distress.     Appearance: Normal appearance. She is not ill-appearing.   HENT:      Head: Normocephalic and atraumatic.     Eyes:      Conjunctiva/sclera: Conjunctivae normal.       Cardiovascular:      Rate and Rhythm: Normal rate and regular rhythm.      Heart sounds: No murmur heard.  Pulmonary:      Effort: Pulmonary effort is normal. No respiratory distress.      Breath sounds: Normal breath sounds.   Abdominal:      General: Abdomen is flat. There is no distension.      Palpations: Abdomen is soft.      Tenderness: There is no abdominal tenderness. There is no guarding or rebound.     Musculoskeletal:         General: No swelling.      Cervical back: Normal range of motion.       Right lower leg: No edema.      Left lower leg: No edema.     Skin:     General: Skin is warm.      Coloration: Skin is not jaundiced.     Neurological:      General: No focal deficit present.      Mental Status: She is alert and oriented to person, place, and time. Mental status is at baseline.     Psychiatric:         Mood and Affect: Mood normal.         Behavior: Behavior normal.           Lab Results: I have reviewed the following results:

## 2025-07-06 NOTE — ASSESSMENT & PLAN NOTE
Patient has had longstanding history of abdominal pain mostly in epigastric area , worse after eating, associated with 10 to 12 pound weight loss since the onset.  Worsening pain and nausea last night prompted her ED visit  Extensive GI work up including normal esophageal manometry, EGD in 2024 mild gastric erosion overall unremarkable with normal biopsies.  Colonoscopy in 2024 diverticulosis.  Celiac disease workup negative by serology and biopsy. Recent breast test positive leading to the diagnosis of SIBO, however no symptomatic improvement yet after receiving 9 days of 14 day course treatment  Lipase nl.    Hx of Irritable bowel syndrome with intermittent constipation diarrhea.  She did have increasing stress and anxiety from recent selling her house and moving.  Her weight loss however is concerning not consistent with typical irritable bowel syndrome  Mesenteric duplex ordered given postprandial abd pain and symptoms.   Stool studies (O&P, enteric bacterial and C. Difficile) ordered   Gi Consulted  Continue Supportive care  IV fluid for dehydration/continue Bentyl as needed. Continue pepcid for h/o GERD

## 2025-07-06 NOTE — PLAN OF CARE
Problem: PAIN - ADULT  Goal: Verbalizes/displays adequate comfort level or baseline comfort level  Description: Interventions:  - Encourage patient to monitor pain and request assistance  - Assess pain using appropriate pain scale  - Administer analgesics as ordered based on type and severity of pain and evaluate response  - Implement non-pharmacological measures as appropriate and evaluate response  - Consider cultural and social influences on pain and pain management  - Notify physician/advanced practitioner if interventions unsuccessful or patient reports new pain  - Educate patient/family on pain management process including their role and importance of  reporting pain   - Provide non-pharmacologic/complimentary pain relief interventions  Outcome: Progressing     Problem: INFECTION - ADULT  Goal: Absence or prevention of progression during hospitalization  Description: INTERVENTIONS:  - Assess and monitor for signs and symptoms of infection  - Monitor lab/diagnostic results  - Monitor all insertion sites, i.e. indwelling lines, tubes, and drains  - Monitor endotracheal if appropriate and nasal secretions for changes in amount and color  - Maize appropriate cooling/warming therapies per order  - Administer medications as ordered  - Instruct and encourage patient and family to use good hand hygiene technique  - Identify and instruct in appropriate isolation precautions for identified infection/condition  Outcome: Progressing  Goal: Absence of fever/infection during neutropenic period  Description: INTERVENTIONS:  - Monitor WBC  - Perform strict hand hygiene  - Limit to healthy visitors only  - No plants, dried, fresh or silk flowers with salazar in patient room  Outcome: Progressing     Problem: SAFETY ADULT  Goal: Patient will remain free of falls  Description: INTERVENTIONS:  - Educate patient/family on patient safety including physical limitations  - Instruct patient to call for assistance with activity   -  Consider consulting OT/PT to assist with strengthening/mobility based on AM PAC & JH-HLM score  - Consult OT/PT to assist with strengthening/mobility   - Keep Call bell within reach  - Keep bed low and locked with side rails adjusted as appropriate  - Keep care items and personal belongings within reach  - Initiate and maintain comfort rounds  - Make Fall Risk Sign visible to staff  - Offer Toileting every Hours, in advance of need  - Initiate/Maintain alarm  - Obtain necessary fall risk management equipment:   - Apply yellow socks and bracelet for high fall risk patients  - Consider moving patient to room near nurses station  Outcome: Progressing  Goal: Maintain or return to baseline ADL function  Description: INTERVENTIONS:  -  Assess patient's ability to carry out ADLs; assess patient's baseline for ADL function and identify physical deficits which impact ability to perform ADLs (bathing, care of mouth/teeth, toileting, grooming, dressing, etc.)  - Assess/evaluate cause of self-care deficits   - Assess range of motion  - Assess patient's mobility; develop plan if impaired  - Assess patient's need for assistive devices and provide as appropriate  - Encourage maximum independence but intervene and supervise when necessary  - Involve family in performance of ADLs  - Assess for home care needs following discharge   - Consider OT consult to assist with ADL evaluation and planning for discharge  - Provide patient education as appropriate  - Monitor functional capacity and physical performance, use of AM PAC & JH-HLM   - Monitor gait, balance and fatigue with ambulation    Outcome: Progressing  Goal: Maintains/Returns to pre admission functional level  Description: INTERVENTIONS:  - Perform AM-PAC 6 Click Basic Mobility/ Daily Activity assessment daily.  - Set and communicate daily mobility goal to care team and patient/family/caregiver.   - Collaborate with rehabilitation services on mobility goals if consulted  -  Perform Range of Motion  times a day.  - Reposition patient every  hours.  - Dangle patient  times a day  - Stand patient  times a day  - Ambulate patient  times a day  - Out of bed to chair  times a day   - Out of bed for meals  times a day  - Out of bed for toileting  - Record patient progress and toleration of activity level   Outcome: Progressing     Problem: DISCHARGE PLANNING  Goal: Discharge to home or other facility with appropriate resources  Description: INTERVENTIONS:  - Identify barriers to discharge w/patient and caregiver  - Arrange for needed discharge resources and transportation as appropriate  - Identify discharge learning needs (meds, wound care, etc.)  - Arrange for interpretive services to assist at discharge as needed  - Refer to Case Management Department for coordinating discharge planning if the patient needs post-hospital services based on physician/advanced practitioner order or complex needs related to functional status, cognitive ability, or social support system  Outcome: Progressing     Problem: Knowledge Deficit  Goal: Patient/family/caregiver demonstrates understanding of disease process, treatment plan, medications, and discharge instructions  Description: Complete learning assessment and assess knowledge base.  Interventions:  - Provide teaching at level of understanding  - Provide teaching via preferred learning methods  Outcome: Progressing

## 2025-07-06 NOTE — ASSESSMENT & PLAN NOTE
BP on arrival 189/90.  Patient does not have h/o hypertension.  Likely due to anxiety.  She reported having panic attack this morning  Takes propranolol prn occasionally for anxiety only  Most recent BP reviewed and not stable  Monitor while admitted

## 2025-07-06 NOTE — ASSESSMENT & PLAN NOTE
Recently diagnosed after positive breath test.   C/w Neomycin and Rifaximin on Day 10 of 14 day course. Will continue until July 10 to finish the course

## 2025-07-06 NOTE — PROGRESS NOTES
Progress Note - Hospitalist   Name: Divina Perales 60 y.o. female I MRN: [id number]  Unit/Bed#: S -01 I Date of Admission: 7/5/2025   Date of Service: 7/6/2025 I Hospital Day: 1    Assessment & Plan  Acute on chronic abdominal pain and bloating  Patient has had longstanding history of abdominal pain mostly in epigastric area , worse after eating, associated with 10 to 12 pound weight loss since the onset.  Worsening pain and nausea last night prompted her ED visit  Extensive GI work up including normal esophageal manometry, EGD in 2024 mild gastric erosion overall unremarkable with normal biopsies.  Colonoscopy in 2024 diverticulosis.  Celiac disease workup negative by serology and biopsy. Recent breast test positive leading to the diagnosis of SIBO, however no symptomatic improvement yet after receiving 9 days of 14 day course treatment  Lipase nl.    Hx of Irritable bowel syndrome with intermittent constipation diarrhea.  She did have increasing stress and anxiety from recent selling her house and moving.  Her weight loss however is concerning not consistent with typical irritable bowel syndrome  Mesenteric duplex ordered given postprandial abd pain and symptoms.   Stool studies (O&P, enteric bacterial and C. Difficile) ordered   Gi Consulted  Continue Supportive care  IV fluid for dehydration/continue Bentyl as needed. Continue pepcid for h/o GERD  Small intestinal bacterial overgrowth (SIBO)  Recently diagnosed after positive breath test.   C/w Neomycin and Rifaximin on Day 10 of 14 day course. Will continue until July 10 to finish the course   Anxiety  She takes Xanax as needed at home.   Elevated blood pressure reading  BP on arrival 189/90.  Patient does not have h/o hypertension.  Likely due to anxiety.  She reported having panic attack this morning  Takes propranolol prn occasionally for anxiety only  Most recent BP reviewed and not stable  Monitor while admitted  Abnormal CT of the abdomen  Noncontrast  CT abdomen showed punctate 2 mm calcification in the distal right ureter with no associated upstream hydroureteral nephrosis.   Patient reports no flank pain.  Likely this is an incidental finding and not related to her symptomology.       VTE Pharmacologic Prophylaxis: VTE Score: 2 Low Risk (Score 0-2) - Encourage Ambulation.    Mobility:   Basic Mobility Inpatient Raw Score: 24  JH-HLM Goal: 8: Walk 250 feet or more  JH-HLM Achieved: 8: Walk 250 feet ot more  JH-HLM Goal achieved. Continue to encourage appropriate mobility.    Patient Centered Rounds: I performed bedside rounds with nursing staff today.   Discussions with Specialists or Other Care Team Provider: GI    Education and Discussions with Family / Patient: Patient declined call to .     Current Length of Stay: 1 day(s)  Current Patient Status: Inpatient   Certification Statement: The patient will continue to require additional inpatient hospital stay due to ongoing abdominal pain and bloating requiring further workup   Discharge Plan: Anticipate discharge in 24-48 hrs to home.    Code Status: Level 1 - Full Code    Subjective   Patient stating she is still having some abdominal pain and bloating. Patient stating pain is slightly improved from admission. Patient stating she did have some nausea yesterday and diarrhea on Friday but nothing since. Patient stating she has been tearful and panicky the last couple of days but does realize she is under more stress than usual. Patient denies any chest pain or shortness of breath.    Objective :  Temp:  [97.6 °F (36.4 °C)-97.9 °F (36.6 °C)] 97.9 °F (36.6 °C)  HR:  [70-79] 76  BP: (124-151)/(78-87) 124/79  Resp:  [16-18] 18  SpO2:  [98 %-100 %] 100 %  O2 Device: None (Room air)    Body mass index is 24.6 kg/m².     Input and Output Summary (last 24 hours):   No intake or output data in the 24 hours ending 07/06/25 0931    Physical Exam  Vitals and nursing note reviewed.   Constitutional:        General: She is not in acute distress.     Appearance: Normal appearance.   HENT:      Head: Normocephalic.      Nose: Nose normal. No congestion.      Mouth/Throat:      Mouth: Mucous membranes are moist.      Pharynx: Oropharynx is clear.     Cardiovascular:      Rate and Rhythm: Normal rate and regular rhythm.      Pulses: Normal pulses.      Heart sounds: No murmur heard.  Pulmonary:      Effort: Pulmonary effort is normal. No respiratory distress.   Abdominal:      General: There is no distension.      Palpations: Abdomen is soft.      Tenderness: There is abdominal tenderness (Mild Upper).     Musculoskeletal:         General: Normal range of motion.      Cervical back: Normal range of motion.     Skin:     General: Skin is warm and dry.      Capillary Refill: Capillary refill takes less than 2 seconds.     Neurological:      Mental Status: She is alert and oriented to person, place, and time. Mental status is at baseline.      Motor: No weakness.     Psychiatric:         Mood and Affect: Mood is anxious. Affect is tearful.         Speech: Speech normal.         Behavior: Behavior is cooperative.           Lines/Drains:              Lab Results: I have reviewed the following results:   Results from last 7 days   Lab Units 07/06/25  0541 07/05/25  0704   WBC Thousand/uL 4.18* 4.97   HEMOGLOBIN g/dL 13.7 15.6*   HEMATOCRIT % 40.8 46.2*   PLATELETS Thousands/uL 177 214   SEGS PCT %  --  66   LYMPHO PCT % 27 29   MONO PCT % 8 5   EOS PCT % 0 0     Results from last 7 days   Lab Units 07/06/25  0541 07/05/25  0704   SODIUM mmol/L 138 140   POTASSIUM mmol/L 4.3 3.9   CHLORIDE mmol/L 107 102   CO2 mmol/L 26 31   BUN mg/dL 11 15   CREATININE mg/dL 0.86 0.91   ANION GAP mmol/L 5 7   CALCIUM mg/dL 8.4 9.8   ALBUMIN g/dL  --  5.0   TOTAL BILIRUBIN mg/dL  --  0.55   ALK PHOS U/L  --  61   ALT U/L  --  25   AST U/L  --  30   GLUCOSE RANDOM mg/dL 89 107                       Recent Cultures (last 7 days):         Imaging  Results Review: I reviewed radiology reports from this admission including: CT abdomen/pelvis.  Other Study Results Review: EKG was reviewed.     Last 24 Hours Medication List:     Current Facility-Administered Medications:     dicyclomine (BENTYL) capsule 10 mg, BID PRN    famotidine (PEPCID) tablet 20 mg, Daily    levothyroxine tablet 75 mcg, Daily    loratadine (CLARITIN) tablet 10 mg, Daily    LORazepam (ATIVAN) tablet 0.5 mg, Q8H PRN    multi-electrolyte (Plasmalyte-A/Isolyte-S PH 7.4/Normosol-R) IV solution, Continuous, Last Rate: 75 mL/hr (07/06/25 0317)    neomycin (MYCIFRADIN) tablet 500 mg, BID    ondansetron (ZOFRAN) injection 4 mg, Q4H PRN    oxyCODONE (ROXICODONE) split tablet 2.5 mg, Q6H PRN    rifaximin (XIFAXAN) tablet 550 mg, Q8H MARII    saccharomyces boulardii (FLORASTOR) capsule 250 mg, BID    venlafaxine (EFFEXOR-XR) 24 hr capsule 37.5 mg, Daily With Breakfast    zolpidem (AMBIEN) tablet 10 mg, HS PRN    Administrative Statements   Today, Patient Was Seen By: KHANH Veras      **Please Note: This note may have been constructed using a voice recognition system.**

## 2025-07-06 NOTE — ASSESSMENT & PLAN NOTE
Patient following closely with our GI team last seen in the office a month ago.  No unremarkable workup with EGD and colonoscopy 7/2024 with negative celiac and H. pylori biopsies, CT scan.  Currently being treated for SIBO.  Unclear etiology of symptoms, question possible underlying gastroparesis, less likely peptic ulcer disease or mesenteric ischemia.    - Will plan for upper GI series tomorrow.  - N.p.o. at midnight    - Recommend decreasing to a full liquid diet until symptoms are improving, then can increase.  - Can continue SIBO treatment while here.  - Will start once daily PPI  -Continue Pepcid    - Antiemetics as needed.  - Recommend ambulation    -Recommend outpatient gastric emptying scan  - Reports allergy to Reglan.

## 2025-07-06 NOTE — UTILIZATION REVIEW
Initial Clinical Review    Admission: Date/Time/Statement:   Admission Orders (From admission, onward)       Ordered        07/05/25 0923  INPATIENT ADMISSION  Once                          Orders Placed This Encounter   Procedures    INPATIENT ADMISSION     Standing Status:   Standing     Number of Occurrences:   1     Level of Care:   Med Surg [16]     Estimated length of stay:   More than 2 Midnights     Certification:   I certify that inpatient services are medically necessary for this patient for a duration of greater than two midnights. See H&P and MD Progress Notes for additional information about the patient's course of treatment.     ED Arrival Information       Expected   -    Arrival   7/5/2025 06:27    Acuity   Urgent              Means of arrival   Walk-In    Escorted by   Self    Service   Hospitalist    Admission type   Emergency              Arrival complaint   Nausea             Chief Complaint   Patient presents with    Abdominal Pain     Pt being treated for SIBO. Started w nausea/epigastric pain, no vomiting  overnight. Pt reports losing 12 lbs since she started her medications last week.        Initial Presentation: 60 y.o. female to ED as walk in presents w/ acute on chronic abdominal pain, epigastric pain w radiation to RUQ w/ bloating.  Poor appetite w loose stool 3 times in 24 HR, similar to previous   Pancreatitis event  HX  multiple GI visits 2/2 ongoing symptoms of abdominal pain mostly in the epigastric area that worsens after eating, associated with bloating and indigestion, and 10-12 pound weight loss in 2 months.  On low- inflammatory diet. S/P extensive workup including esophageal manometry, EGD and colonoscopy non revealing.  Recently diagnosed with small bowel bacterial growth after positive carbohydrate breath test. Started on neomycin and rifaximin that she is taking 9 days of 14-day course wo improvement   Exam  tachycardia, hypertensive; abd tenderness in epigastric area;   CT  ABD reveal punctate 2 mm calcification in the distal right ureter wo hydro ureteral nephrosis.  PLAN Inpatient admission due to acute on  chronic abd pain & bloating, small intestinal bacterial overgrowth (SIBO), elevated BP, consult GI, IVF, PRN Bentyl, Pepcid. Obtain mesenteric duplex, stool studies; cont Neomycin & Rifaximin until 7/10, trend BP  Anticipated Length of Stay/Certification Statement: Patient will be admitted on an inpatient basis with an anticipated length of stay of greater than 2 midnights secondary to GI workup and evaluation, hydration monitor for intake.  Date: 7/6/2025   Day 2:   Some abdominal pain and bloating. No flank pain.  Pain  slightly improved from admission. Some nausea yesterday and diarrhea on Friday but nothing since. She has been tearful and panicky the last couple of days but does realize she is under more stress   Per GI cont supportive care, IVF/ PRN Bentyl; studies pending; stool studies ordered, cont Neomycin/rifaximin    ED Treatment-Medication Administration from 07/05/2025 0627 to 07/05/2025 1547         Date/Time Order Dose Route Action     07/05/2025 0655 ondansetron (ZOFRAN) injection 4 mg 4 mg Intravenous Given     07/05/2025 0732 LORazepam (ATIVAN) tablet 0.5 mg 0.5 mg Oral Given     07/05/2025 0837 acetaminophen (Ofirmev) injection 1,000 mg 1,000 mg Intravenous New Bag     07/05/2025 0834 HYDROmorphone HCl (DILAUDID) injection 0.2 mg 0.2 mg Intravenous Given     07/05/2025 1254 famotidine (PEPCID) tablet 20 mg 20 mg Oral Given     07/05/2025 1254 loratadine (CLARITIN) tablet 10 mg 10 mg Oral Given     07/05/2025 1254 neomycin (MYCIFRADIN) tablet 500 mg 500 mg Oral Given     07/05/2025 1254 saccharomyces boulardii (FLORASTOR) capsule 250 mg 250 mg Oral Given     07/05/2025 1401 rifaximin (XIFAXAN) tablet 550 mg 550 mg Oral Given     07/05/2025 1258 multi-electrolyte (Plasmalyte-A/Isolyte-S PH 7.4/Normosol-R) IV solution 75 mL/hr Intravenous New Bag     07/05/2025  1319 oxyCODONE (ROXICODONE) split tablet 2.5 mg 2.5 mg Oral Given            Scheduled Medications:  famotidine, 20 mg, Oral, Daily  levothyroxine, 75 mcg, Oral, Daily  loratadine, 10 mg, Oral, Daily  neomycin, 500 mg, Oral, BID  rifaximin, 550 mg, Oral, Q8H MARII  saccharomyces boulardii, 250 mg, Oral, BID  venlafaxine, 37.5 mg, Oral, Daily With Breakfast      Continuous IV Infusions:  multi-electrolyte, 75 mL/hr, Intravenous, Continuous      PRN Meds:  dicyclomine, 10 mg, Oral, BID PRN  LORazepam, 0.5 mg, Oral, Q8H PRN  ondansetron, 4 mg, Intravenous, Q4H PRN  oxyCODONE, 2.5 mg, Oral, Q6H PRN  zolpidem, 10 mg, Oral, HS PRN      ED Triage Vitals   Temperature Pulse Respirations Blood Pressure SpO2 Pain Score   07/05/25 0631 07/05/25 0631 07/05/25 0631 07/05/25 0631 07/05/25 0631 07/05/25 0834   97.7 °F (36.5 °C) 95 18 (!) 189/90 99 % 6     Weight (last 2 days)       Date/Time Weight    07/05/25 1538 55.2 (121.8)            Vital Signs (last 3 days)       Date/Time Temp Pulse Resp BP MAP (mmHg) SpO2 O2 Device Patient Position - Orthostatic VS Maynor Coma Scale Score Pain    07/06/25 0855 -- -- -- -- -- 100 % None (Room air) -- 15 4    07/06/25 08:34:31 97.9 °F (36.6 °C) 76 18 124/79 94 100 % -- -- -- --    07/05/25 21:53:36 97.6 °F (36.4 °C) 79 -- 151/82 105 100 % -- -- -- --    07/05/25 2100 -- -- -- -- -- -- None (Room air) -- 15 4    07/05/25 16:28:10 97.8 °F (36.6 °C) 71 -- 140/84 103 98 % -- -- -- --    07/05/25 1600 -- -- -- -- -- -- None (Room air) -- 15 2    07/05/25 1538 97.9 °F (36.6 °C) 70 16 150/87 -- 100 % None (Room air) Lying -- 2    07/05/25 1437 -- -- -- -- -- -- -- -- -- 3    07/05/25 1042 -- 72 16 146/78 -- 100 % None (Room air) Lying -- --    07/05/25 0954 -- -- -- -- -- -- -- -- 15 --    07/05/25 0834 -- -- -- -- -- -- -- -- -- 6    07/05/25 0828 -- -- -- -- -- -- None (Room air) -- -- --    07/05/25 0810 -- 82 17 177/81 -- 99 % None (Room air) Lying -- --    07/05/25 0631 97.7 °F (36.5 °C) 95 18  189/90 -- 99 % None (Room air) Lying -- --              Pertinent Labs/Diagnostic Test Results:   Radiology:  CT abdomen pelvis wo contrast   Final Interpretation by Wes Cardenas MD (07/05 0848)      Punctate nonobstructing left renal calculus. Mildly prominent right extrarenal pelvis. Punctate 2 mm calcification seen in close association with the distal right ureter. This may either reflect a punctate distal right ureteral calculus without upstream    hydroureter or hydronephrosis or more likely represents an adjacent calcified phlebolith.      No bowel obstruction or focal inflammatory process. Colonic diverticulosis without acute diverticulitis.      Additional findings as above.      Workstation performed: [id number]         VAS CELIAC AND/OR MESENTERIC DUPLEX    (Results Pending)   FL upper GI UGI    (Results Pending)     Cardiology:  ECG 12 lead   Final Result by Louise Belle MD (07/05 0816)   Normal sinus rhythm   Left bundle branch block   Abnormal ECG   No previous ECGs available   Confirmed by Louise Belle ([id number]) on 7/5/2025 8:16:33 AM        GI:  No orders to display           Results from last 7 days   Lab Units 07/06/25  0541 07/05/25  0704   WBC Thousand/uL 4.18* 4.97   HEMOGLOBIN g/dL 13.7 15.6*   HEMATOCRIT % 40.8 46.2*   PLATELETS Thousands/uL 177 214   TOTAL NEUT ABS Thousands/µL  --  3.23         Results from last 7 days   Lab Units 07/06/25  0541 07/05/25  0704   SODIUM mmol/L 138 140   POTASSIUM mmol/L 4.3 3.9   CHLORIDE mmol/L 107 102   CO2 mmol/L 26 31   ANION GAP mmol/L 5 7   BUN mg/dL 11 15   CREATININE mg/dL 0.86 0.91   EGFR ml/min/1.73sq m 73 68   CALCIUM mg/dL 8.4 9.8     Results from last 7 days   Lab Units 07/05/25  0704   AST U/L 30   ALT U/L 25   ALK PHOS U/L 61   TOTAL PROTEIN g/dL 7.5   ALBUMIN g/dL 5.0   TOTAL BILIRUBIN mg/dL 0.55         Results from last 7 days   Lab Units 07/06/25  0541 07/05/25  0704   GLUCOSE RANDOM mg/dL 89 107             No results found  "for: \"BETA-HYDROXYBUTYRATE\"                   Results from last 7 days   Lab Units 07/05/25  0704   HS TNI 0HR ng/L 3             Results from last 7 days   Lab Units 07/06/25  0541   TSH 3RD GENERATON uIU/mL 2.156                                         Results from last 7 days   Lab Units 07/05/25  0704   LIPASE u/L 38                 Results from last 7 days   Lab Units 07/05/25  0809   CLARITY UA  Clear   COLOR UA  Colorless   SPEC GRAV UA  1.004   PH UA  8.0   GLUCOSE UA mg/dl Negative   KETONES UA mg/dl Negative   BLOOD UA  Negative   PROTEIN UA mg/dl Negative   NITRITE UA  Negative   BILIRUBIN UA  Negative   UROBILINOGEN UA (BE) mg/dl <2.0   LEUKOCYTES UA  Negative         Past Medical History[1]  Present on Admission:  **None**      Admitting Diagnosis: Nausea [R11.0]  Epigastric pain [R10.13]  Abdominal pain [R10.9]  Age/Sex: 60 y.o. female    Network Utilization Review Department  ATTENTION: Please call with any questions or concerns to [phone] and carefully listen to the prompts so that you are directed to the right person. All voicemails are confidential.   For Discharge needs, contact Care Management DC Support Team at [phone] opt. 2  Send all requests for admission clinical reviews, approved or denied determinations and any other requests to dedicated fax number below belonging to the campus where the patient is receiving treatment. List of dedicated fax numbers for the Facilities:  FACILITY NAME UR FAX NUMBER   ADMISSION DENIALS (Administrative/Medical Necessity) [phone]   DISCHARGE SUPPORT TEAM (NETWORK) [phone]   PARENT CHILD HEALTH (Maternity/NICU/Pediatrics) [phone]   [facility] [phone]   [facility] [phone]   [facility] [phone]   [facility] [phone]   [facility] [phone]   [facility] " [phone]   [facility] [phone]   [facility] [phone]   [facility] [phone]   [facility] [phone]   [facility] [phone]   [facility] [phone]              [1]   Past Medical History:  Diagnosis Date    Allergic     Seasonal    Allergic rhinitis     As a child    Anxiety     BBB (bundle branch block)     left- monitored by PCP-saw cardiologist in past, had workup-no problems 15 years ago    Clotting disorder (HCC)     Platelet disorder-idiopathic    Colon polyp     Depression     Disease of thyroid gland     Hypothyroidism    Ear problems     Tubes over the yrs    Fatty liver     Fibromyalgia, primary     Gastric ulcer     GERD (gastroesophageal reflux disease)     Hiatal hernia     Irritable bowel syndrome     Kidney stone     Palpitation     occasional    Sleep apnea     Sleep difficulties     Insomia    TMJ (dislocation of temporomandibular joint)     cannot keep mouth open for long periods of time, wears mouth guard at bedtime

## 2025-07-07 ENCOUNTER — HOSPITAL ENCOUNTER (INPATIENT)
Dept: VASCULAR ULTRASOUND | Facility: HOSPITAL | Age: 60
Discharge: HOME | DRG: 392 | End: 2025-07-07
Payer: COMMERCIAL

## 2025-07-07 ENCOUNTER — APPOINTMENT (INPATIENT)
Dept: RADIOLOGY | Facility: HOSPITAL | Age: 60
DRG: 392 | End: 2025-07-07
Payer: COMMERCIAL

## 2025-07-07 PROBLEM — K58.1 IRRITABLE BOWEL SYNDROME WITH CONSTIPATION: Status: ACTIVE | Noted: 2021-06-24

## 2025-07-07 LAB
ALBUMIN SERPL BCG-MCNC: 3.7 G/DL (ref 3.5–5)
ALP SERPL-CCNC: 45 U/L (ref 34–104)
ALT SERPL W P-5'-P-CCNC: 22 U/L (ref 7–52)
ANION GAP SERPL CALCULATED.3IONS-SCNC: 4 MMOL/L (ref 4–13)
AST SERPL W P-5'-P-CCNC: 25 U/L (ref 13–39)
BILIRUB SERPL-MCNC: 0.48 MG/DL (ref 0.2–1)
BUN SERPL-MCNC: 9 MG/DL (ref 5–25)
CALCIUM SERPL-MCNC: 8.4 MG/DL (ref 8.4–10.2)
CHLORIDE SERPL-SCNC: 106 MMOL/L (ref 96–108)
CO2 SERPL-SCNC: 28 MMOL/L (ref 21–32)
CREAT SERPL-MCNC: 0.74 MG/DL (ref 0.6–1.3)
ERYTHROCYTE [DISTWIDTH] IN BLOOD BY AUTOMATED COUNT: 12.3 % (ref 11.6–15.1)
GFR SERPL CREATININE-BSD FRML MDRD: 88 ML/MIN/1.73SQ M
GLUCOSE SERPL-MCNC: 96 MG/DL (ref 65–140)
HCT VFR BLD AUTO: 39 % (ref 34.8–46.1)
HGB BLD-MCNC: 12.9 G/DL (ref 11.5–15.4)
MCH RBC QN AUTO: 31.1 PG (ref 26.8–34.3)
MCHC RBC AUTO-ENTMCNC: 33.1 G/DL (ref 31.4–37.4)
MCV RBC AUTO: 94 FL (ref 82–98)
PLATELET # BLD AUTO: 163 THOUSANDS/UL (ref 149–390)
PMV BLD AUTO: 10.5 FL (ref 8.9–12.7)
POTASSIUM SERPL-SCNC: 3.9 MMOL/L (ref 3.5–5.3)
PROT SERPL-MCNC: 5.7 G/DL (ref 6.4–8.4)
RBC # BLD AUTO: 4.15 MILLION/UL (ref 3.81–5.12)
SODIUM SERPL-SCNC: 138 MMOL/L (ref 135–147)
WBC # BLD AUTO: 4.28 THOUSAND/UL (ref 4.31–10.16)

## 2025-07-07 PROCEDURE — 80053 COMPREHEN METABOLIC PANEL: CPT | Performed by: INTERNAL MEDICINE

## 2025-07-07 PROCEDURE — 99232 SBSQ HOSP IP/OBS MODERATE 35: CPT | Performed by: INTERNAL MEDICINE

## 2025-07-07 PROCEDURE — 74240 X-RAY XM UPR GI TRC 1CNTRST: CPT

## 2025-07-07 PROCEDURE — 93975 VASCULAR STUDY: CPT | Performed by: SURGERY

## 2025-07-07 PROCEDURE — 93975 VASCULAR STUDY: CPT

## 2025-07-07 PROCEDURE — 85027 COMPLETE CBC AUTOMATED: CPT | Performed by: INTERNAL MEDICINE

## 2025-07-07 PROCEDURE — 99232 SBSQ HOSP IP/OBS MODERATE 35: CPT

## 2025-07-07 RX ORDER — POLYETHYLENE GLYCOL 3350 17 G/17G
17 POWDER, FOR SOLUTION ORAL DAILY
Status: DISCONTINUED | OUTPATIENT
Start: 2025-07-07 | End: 2025-07-08 | Stop reason: HOSPADM

## 2025-07-07 RX ORDER — SODIUM CHLORIDE, SODIUM LACTATE, POTASSIUM CHLORIDE, CALCIUM CHLORIDE 600; 310; 30; 20 MG/100ML; MG/100ML; MG/100ML; MG/100ML
125 INJECTION, SOLUTION INTRAVENOUS CONTINUOUS
Status: CANCELLED | OUTPATIENT
Start: 2025-07-07

## 2025-07-07 RX ORDER — DOCUSATE SODIUM 100 MG/1
100 CAPSULE, LIQUID FILLED ORAL DAILY
Status: DISCONTINUED | OUTPATIENT
Start: 2025-07-07 | End: 2025-07-08 | Stop reason: HOSPADM

## 2025-07-07 RX ADMIN — ZOLPIDEM TARTRATE 10 MG: 5 TABLET ORAL at 21:13

## 2025-07-07 RX ADMIN — LORATADINE 10 MG: 10 TABLET ORAL at 09:44

## 2025-07-07 RX ADMIN — RIFAXIMIN 550 MG: 550 TABLET ORAL at 21:12

## 2025-07-07 RX ADMIN — SODIUM CHLORIDE, SODIUM GLUCONATE, SODIUM ACETATE, POTASSIUM CHLORIDE, MAGNESIUM CHLORIDE, SODIUM PHOSPHATE, DIBASIC, AND POTASSIUM PHOSPHATE 75 ML/HR: .53; .5; .37; .037; .03; .012; .00082 INJECTION, SOLUTION INTRAVENOUS at 21:19

## 2025-07-07 RX ADMIN — SODIUM CHLORIDE, SODIUM GLUCONATE, SODIUM ACETATE, POTASSIUM CHLORIDE, MAGNESIUM CHLORIDE, SODIUM PHOSPHATE, DIBASIC, AND POTASSIUM PHOSPHATE 75 ML/HR: .53; .5; .37; .037; .03; .012; .00082 INJECTION, SOLUTION INTRAVENOUS at 06:11

## 2025-07-07 RX ADMIN — Medication 250 MG: at 09:44

## 2025-07-07 RX ADMIN — RIFAXIMIN 550 MG: 550 TABLET ORAL at 05:56

## 2025-07-07 RX ADMIN — NEOMYCIN SULFATE 500 MG: 500 TABLET ORAL at 21:13

## 2025-07-07 RX ADMIN — NEOMYCIN SULFATE 500 MG: 500 TABLET ORAL at 09:44

## 2025-07-07 RX ADMIN — FAMOTIDINE 20 MG: 20 TABLET, FILM COATED ORAL at 09:44

## 2025-07-07 RX ADMIN — POLYETHYLENE GLYCOL 3350 17 G: 17 POWDER, FOR SOLUTION ORAL at 11:31

## 2025-07-07 RX ADMIN — VENLAFAXINE HYDROCHLORIDE 37.5 MG: 37.5 CAPSULE, EXTENDED RELEASE ORAL at 09:44

## 2025-07-07 RX ADMIN — RIFAXIMIN 550 MG: 550 TABLET ORAL at 13:30

## 2025-07-07 RX ADMIN — Medication 250 MG: at 17:01

## 2025-07-07 RX ADMIN — PANTOPRAZOLE SODIUM 40 MG: 40 INJECTION, POWDER, LYOPHILIZED, FOR SOLUTION INTRAVENOUS at 09:44

## 2025-07-07 RX ADMIN — LEVOTHYROXINE SODIUM 75 MCG: 0.07 TABLET ORAL at 09:44

## 2025-07-07 RX ADMIN — LORAZEPAM 0.5 MG: 0.5 TABLET ORAL at 21:18

## 2025-07-07 RX ADMIN — DOCUSATE SODIUM 100 MG: 100 CAPSULE, LIQUID FILLED ORAL at 11:31

## 2025-07-07 NOTE — ASSESSMENT & PLAN NOTE
Patient has history of irritable bowel syndrome with constipation.  Patient reports she usually takes Colace daily at home and this keeps her symptoms controlled.  Patient has had no bowel movement since Friday.    Start Colace 100 mg daily  Start MiraLAX 17 g daily

## 2025-07-07 NOTE — PROGRESS NOTES
Progress Note - Gastroenterology   Name: Divina Perales 60 y.o. female I MRN: [id number]  Unit/Bed#: S -01 I Date of Admission: 7/5/2025   Date of Service: 7/7/2025 I Hospital Day: 2    Assessment & Plan  Acute on chronic abdominal pain and bloating  Patient following closely with our GI team last seen in the office a month ago.  No unremarkable workup with EGD and colonoscopy 7/2024 with negative celiac and H. pylori biopsies, CT scan.  Currently being treated for SIBO.  Unclear etiology of symptoms, question possible underlying gastroparesis, less likely peptic ulcer disease or mesenteric ischemia.      UGI done 7/7 showed unremarkable upper GI.  Slight delayed passage of barium tablet in esophagus.Although the ligament of Treitz appears intact there is somewhat redundant proximal jejunum present. It is not dilated   Mesenteric and celiac duplex  done 7/7 showed patent celiac, splenic, mesenteric, renal, and common hepatic arteries.    Scheduled for EGD with push enteroscopy tomorrow.  Prep and procedure explained to patient in detail.  Further recommendations pending results of procedure.  Continue SIBO treatment while here.  Continue  PPI  Continue Pepcid  Antiemetics as needed  Recommend outpatient gastric emptying scan  Reports allergy to Reglan.    Irritable bowel syndrome with constipation  Patient has history of irritable bowel syndrome with constipation.  Patient reports she usually takes Colace daily at home and this keeps her symptoms controlled.  Patient has had no bowel movement since Friday.    Start Colace 100 mg daily  Start MiraLAX 17 g daily      Subjective   Lying in bed in no acute distress.  Tolerating diet.  Reports intermittent nausea and acid reflux.  Denies vomiting or heartburn.  Continues with abdominal bloating and epigastric pain.  Patient reports last bowel movement was Friday.    Objective :  Temp:  [97.3 °F (36.3 °C)-97.6 °F (36.4 °C)] 97.6 °F (36.4 °C)  HR:  [64-70] 64  BP:  (117-134)/(72-82) 127/72  Resp:  [17-18] 17  SpO2:  [97 %-100 %] 100 %  O2 Device: None (Room air)    Physical Exam  Vitals and nursing note reviewed.   Constitutional:       General: She is not in acute distress.     Appearance: She is well-developed.   HENT:      Head: Normocephalic and atraumatic.     Eyes:      Conjunctiva/sclera: Conjunctivae normal.       Cardiovascular:      Rate and Rhythm: Normal rate and regular rhythm.      Pulses: Normal pulses.      Heart sounds: Normal heart sounds. No murmur heard.  Pulmonary:      Effort: Pulmonary effort is normal. No respiratory distress.      Breath sounds: Normal breath sounds. No stridor. No wheezing, rhonchi or rales.   Abdominal:      General: Bowel sounds are normal. There is no distension.      Palpations: Abdomen is soft. There is no mass.      Tenderness: There is abdominal tenderness in the epigastric area. There is no guarding or rebound.     Musculoskeletal:         General: No swelling.      Cervical back: Neck supple.      Right lower leg: No edema.      Left lower leg: No edema.     Skin:     General: Skin is warm and dry.      Capillary Refill: Capillary refill takes less than 2 seconds.      Coloration: Skin is not jaundiced or pale.     Neurological:      Mental Status: She is alert and oriented to person, place, and time.     Psychiatric:         Mood and Affect: Mood normal.         Lab Results: I have reviewed the following results:CBC/BMP:   .     07/07/25 0317 07/07/25 0318   WBC 4.28*  --    HGB 12.9  --    HCT 39.0  --      --    SODIUM  --  138   K  --  3.9   CL  --  106   CO2  --  28   BUN  --  9   CREATININE  --  0.74   GLUC  --  96    , LFTs:   .     07/07/25 0318   AST 25   ALT 22   ALB 3.7   TBILI 0.48   ALKPHOS 45        Imaging Results Review: I reviewed radiology reports from this admission including: Upper GI, celiac and mesenteric duplex, and CT abdomen/pelvis.

## 2025-07-07 NOTE — ASSESSMENT & PLAN NOTE
Patient following closely with our GI team last seen in the office a month ago.  No unremarkable workup with EGD and colonoscopy 7/2024 with negative celiac and H. pylori biopsies, CT scan.  Currently being treated for SIBO.  Unclear etiology of symptoms, question possible underlying gastroparesis, less likely peptic ulcer disease or mesenteric ischemia.      UGI done 7/7 showed unremarkable upper GI.  Slight delayed passage of barium tablet in esophagus.Although the ligament of Treitz appears intact there is somewhat redundant proximal jejunum present. It is not dilated   Mesenteric and celiac duplex  done 7/7 showed patent celiac, splenic, mesenteric, renal, and common hepatic arteries.    Scheduled for EGD with push enteroscopy tomorrow.  Prep and procedure explained to patient in detail.  Further recommendations pending results of procedure.  Continue SIBO treatment while here.  Continue  PPI  Continue Pepcid  Antiemetics as needed  Recommend outpatient gastric emptying scan  Reports allergy to Reglan.

## 2025-07-07 NOTE — ASSESSMENT & PLAN NOTE
Recently diagnosed after positive breath test.   C/w Neomycin and Rifaximin on Day 11 of 14 day course. Will continue until July 10 to finish the course

## 2025-07-07 NOTE — ASSESSMENT & PLAN NOTE
Patient has had longstanding history of abdominal pain mostly in epigastric area , worse after eating, associated with 10 to 12 pound weight loss since the onset.  Worsening pain and nausea last night prompted her ED visit  Extensive GI work up including normal esophageal manometry, EGD in 2024 mild gastric erosion overall unremarkable with normal biopsies.  Colonoscopy in 2024 diverticulosis.  Celiac disease workup negative by serology and biopsy. Recent breast test positive leading to the diagnosis of SIBO, however no symptomatic improvement yet after receiving 9 days of 14 day course treatment  Lipase nl.    Hx of Irritable bowel syndrome with intermittent constipation diarrhea.  She did have increasing stress and anxiety from recent selling her house and moving.  Her weight loss however is concerning not consistent with typical irritable bowel syndrome  Mesenteric duplex: results pending  Upper Gi Series: Scheduled for This am  Stool studies (O&P, enteric bacterial and C. Difficile)-> No further episodes of diarrhea since 7/4. Will discontinue stool studies  Gi Consulted  Continue Supportive care  CLD with IV fluid for dehydration. Continue Bentyl as needed. Continue pepcid for h/o GERD

## 2025-07-07 NOTE — ASSESSMENT & PLAN NOTE
BP on arrival 189/90.  Patient does not have h/o hypertension.  Likely due to anxiety.  She reported having panic attack this morning  Takes propranolol prn occasionally for anxiety only  Most recent BP reviewed and now stable  Monitor while admitted

## 2025-07-07 NOTE — PLAN OF CARE
Problem: PAIN - ADULT  Goal: Verbalizes/displays adequate comfort level or baseline comfort level  Description: Interventions:  - Encourage patient to monitor pain and request assistance  - Assess pain using appropriate pain scale  - Administer analgesics as ordered based on type and severity of pain and evaluate response  - Implement non-pharmacological measures as appropriate and evaluate response  - Consider cultural and social influences on pain and pain management  - Notify physician/advanced practitioner if interventions unsuccessful or patient reports new pain  - Educate patient/family on pain management process including their role and importance of  reporting pain   - Provide non-pharmacologic/complimentary pain relief interventions  Outcome: Progressing     Problem: INFECTION - ADULT  Goal: Absence or prevention of progression during hospitalization  Description: INTERVENTIONS:  - Assess and monitor for signs and symptoms of infection  - Monitor lab/diagnostic results  - Monitor all insertion sites, i.e. indwelling lines, tubes, and drains  - Monitor endotracheal if appropriate and nasal secretions for changes in amount and color  - [place] appropriate cooling/warming therapies per order  - Administer medications as ordered  - Instruct and encourage patient and family to use good hand hygiene technique  - Identify and instruct in appropriate isolation precautions for identified infection/condition  Outcome: Progressing  Goal: Absence of fever/infection during neutropenic period  Description: INTERVENTIONS:  - Monitor WBC  - Perform strict hand hygiene  - Limit to healthy visitors only  - No plants, dried, fresh or silk flowers with salazar in patient room  Outcome: Progressing     Problem: SAFETY ADULT  Goal: Patient will remain free of falls  Description: INTERVENTIONS:  - Educate patient/family on patient safety including physical limitations  - Instruct patient to call for assistance with activity   -  Consider consulting OT/PT to assist with strengthening/mobility based on AM PAC & JH-HLM score  - Consult OT/PT to assist with strengthening/mobility   - Keep Call bell within reach  - Keep bed low and locked with side rails adjusted as appropriate  - Keep care items and personal belongings within reach  - Initiate and maintain comfort rounds  - Make Fall Risk Sign visible to staff  - Offer Toileting every  Hours, in advance of need  - Initiate/Maintain alarm  - Obtain necessary fall risk management equipment:   - Apply yellow socks and bracelet for high fall risk patients  - Consider moving patient to room near nurses station  Outcome: Progressing  Goal: Maintain or return to baseline ADL function  Description: INTERVENTIONS:  -  Assess patient's ability to carry out ADLs; assess patient's baseline for ADL function and identify physical deficits which impact ability to perform ADLs (bathing, care of mouth/teeth, toileting, grooming, dressing, etc.)  - Assess/evaluate cause of self-care deficits   - Assess range of motion  - Assess patient's mobility; develop plan if impaired  - Assess patient's need for assistive devices and provide as appropriate  - Encourage maximum independence but intervene and supervise when necessary  - Involve family in performance of ADLs  - Assess for home care needs following discharge   - Consider OT consult to assist with ADL evaluation and planning for discharge  - Provide patient education as appropriate  - Monitor functional capacity and physical performance, use of AM PAC & JH-HLM   - Monitor gait, balance and fatigue with ambulation    Outcome: Progressing  Goal: Maintains/Returns to pre admission functional level  Description: INTERVENTIONS:  - Perform AM-PAC 6 Click Basic Mobility/ Daily Activity assessment daily.  - Set and communicate daily mobility goal to care team and patient/family/caregiver.   - Collaborate with rehabilitation services on mobility goals if consulted  -  Perform Range of Motion  times a day.  - Reposition patient every  hours.  - Dangle patient  times a day  - Stand patient  times a day  - Ambulate patient  times a day  - Out of bed to chair  times a day   - Out of bed for meal times a day  - Out of bed for toileting  - Record patient progress and toleration of activity level   Outcome: Progressing     Problem: DISCHARGE PLANNING  Goal: Discharge to home or other facility with appropriate resources  Description: INTERVENTIONS:  - Identify barriers to discharge w/patient and caregiver  - Arrange for needed discharge resources and transportation as appropriate  - Identify discharge learning needs (meds, wound care, etc.)  - Arrange for interpretive services to assist at discharge as needed  - Refer to Case Management Department for coordinating discharge planning if the patient needs post-hospital services based on physician/advanced practitioner order or complex needs related to functional status, cognitive ability, or social support system  Outcome: Progressing     Problem: Knowledge Deficit  Goal: Patient/family/caregiver demonstrates understanding of disease process, treatment plan, medications, and discharge instructions  Description: Complete learning assessment and assess knowledge base.  Interventions:  - Provide teaching at level of understanding  - Provide teaching via preferred learning methods  Outcome: Progressing     Problem: GASTROINTESTINAL - ADULT  Goal: Minimal or absence of nausea and/or vomiting  Description: INTERVENTIONS:  - Administer IV fluids if ordered to ensure adequate hydration  - Maintain NPO status until nausea and vomiting are resolved  - Nasogastric tube if ordered  - Administer ordered antiemetic medications as needed  - Provide nonpharmacologic comfort measures as appropriate  - Advance diet as tolerated, if ordered  - Consider nutrition services referral to assist patient with adequate nutrition and appropriate food  choices  Outcome: Progressing  Goal: Maintains or returns to baseline bowel function  Description: INTERVENTIONS:  - Assess bowel function  - Encourage oral fluids to ensure adequate hydration  - Administer IV fluids if ordered to ensure adequate hydration  - Administer ordered medications as needed  - Encourage mobilization and activity  - Consider nutritional services referral to assist patient with adequate nutrition and appropriate food choices  Outcome: Progressing  Goal: Maintains adequate nutritional intake  Description: INTERVENTIONS:  - Monitor percentage of each meal consumed  - Identify factors contributing to decreased intake, treat as appropriate  - Assist with meals as needed  - Monitor I&O, weight, and lab values if indicated  - Obtain nutrition services referral as needed  Outcome: Progressing  Goal: Establish and maintain optimal ostomy function  Description: INTERVENTIONS:  - Assess bowel function  - Encourage oral fluids to ensure adequate hydration  - Administer IV fluids if ordered to ensure adequate hydration   - Administer ordered medications as needed  - Encourage mobilization and activity  - Nutrition services referral to assist patient with appropriate food choices  - Assess stoma site  - Consider wound care consult   Outcome: Progressing  Goal: Oral mucous membranes remain intact  Description: INTERVENTIONS  - Assess oral mucosa and hygiene practices  - Implement preventative oral hygiene regimen  - Implement oral medicated treatments as ordered  - Initiate Nutrition services referral as needed  Outcome: Progressing     Problem: GASTROINTESTINAL - ADULT  Goal: Minimal or absence of nausea and/or vomiting  Description: INTERVENTIONS:  - Administer IV fluids if ordered to ensure adequate hydration  - Maintain NPO status until nausea and vomiting are resolved  - Nasogastric tube if ordered  - Administer ordered antiemetic medications as needed  - Provide nonpharmacologic comfort measures as  appropriate  - Advance diet as tolerated, if ordered  - Consider nutrition services referral to assist patient with adequate nutrition and appropriate food choices  Outcome: Progressing  Goal: Maintains or returns to baseline bowel function  Description: INTERVENTIONS:  - Assess bowel function  - Encourage oral fluids to ensure adequate hydration  - Administer IV fluids if ordered to ensure adequate hydration  - Administer ordered medications as needed  - Encourage mobilization and activity  - Consider nutritional services referral to assist patient with adequate nutrition and appropriate food choices  Outcome: Progressing  Goal: Maintains adequate nutritional intake  Description: INTERVENTIONS:  - Monitor percentage of each meal consumed  - Identify factors contributing to decreased intake, treat as appropriate  - Assist with meals as needed  - Monitor I&O, weight, and lab values if indicated  - Obtain nutrition services referral as needed  Outcome: Progressing  Goal: Establish and maintain optimal ostomy function  Description: INTERVENTIONS:  - Assess bowel function  - Encourage oral fluids to ensure adequate hydration  - Administer IV fluids if ordered to ensure adequate hydration   - Administer ordered medications as needed  - Encourage mobilization and activity  - Nutrition services referral to assist patient with appropriate food choices  - Assess stoma site  - Consider wound care consult   Outcome: Progressing  Goal: Oral mucous membranes remain intact  Description: INTERVENTIONS  - Assess oral mucosa and hygiene practices  - Implement preventative oral hygiene regimen  - Implement oral medicated treatments as ordered  - Initiate Nutrition services referral as needed  Outcome: Progressing     Problem: Nutrition/Hydration-ADULT  Goal: Nutrient/Hydration intake appropriate for improving, restoring or maintaining nutritional needs  Description: Monitor and assess patient's nutrition/hydration status for  malnutrition. Collaborate with interdisciplinary team and initiate plan and interventions as ordered.  Monitor patient's weight and dietary intake as ordered or per policy. Utilize nutrition screening tool and intervene as necessary. Determine patient's food preferences and provide high-protein, high-caloric foods as appropriate.     INTERVENTIONS:  - Monitor oral intake, urinary output, labs, and treatment plans  - Assess nutrition and hydration status and recommend course of action  - Evaluate amount of meals eaten  - Assist patient with eating if necessary   - Allow adequate time for meals  - Recommend/ encourage appropriate diets, oral nutritional supplements, and vitamin/mineral supplements  - Order, calculate, and assess calorie counts as needed  - Recommend, monitor, and adjust tube feedings and TPN/PPN based on assessed needs  - Assess need for intravenous fluids  - Provide specific nutrition/hydration education as appropriate  - Include patient/family/caregiver in decisions related to nutrition  Outcome: Progressing

## 2025-07-07 NOTE — PROGRESS NOTES
Progress Note - Hospitalist   Name: Divina Perales 60 y.o. female I MRN: [id number]  Unit/Bed#: S -01 I Date of Admission: 7/5/2025   Date of Service: 7/7/2025 I Hospital Day: 2    Assessment & Plan  Acute on chronic abdominal pain and bloating  Patient has had longstanding history of abdominal pain mostly in epigastric area , worse after eating, associated with 10 to 12 pound weight loss since the onset.  Worsening pain and nausea last night prompted her ED visit  Extensive GI work up including normal esophageal manometry, EGD in 2024 mild gastric erosion overall unremarkable with normal biopsies.  Colonoscopy in 2024 diverticulosis.  Celiac disease workup negative by serology and biopsy. Recent breast test positive leading to the diagnosis of SIBO, however no symptomatic improvement yet after receiving 9 days of 14 day course treatment  Lipase nl.    Hx of Irritable bowel syndrome with intermittent constipation diarrhea.  She did have increasing stress and anxiety from recent selling her house and moving.  Her weight loss however is concerning not consistent with typical irritable bowel syndrome  Mesenteric duplex: results pending  Upper Gi Series: Scheduled for This am  Stool studies (O&P, enteric bacterial and C. Difficile)-> No further episodes of diarrhea since 7/4. Will discontinue stool studies  Gi Consulted  Continue Supportive care  CLD with IV fluid for dehydration. Continue Bentyl as needed. Continue pepcid for h/o GERD  Small intestinal bacterial overgrowth (SIBO)  Recently diagnosed after positive breath test.   C/w Neomycin and Rifaximin on Day 11 of 14 day course. Will continue until July 10 to finish the course   Anxiety  Continue PTA Medications: Xanax as needed at home.   Elevated blood pressure reading  BP on arrival 189/90.  Patient does not have h/o hypertension.  Likely due to anxiety.  She reported having panic attack this morning  Takes propranolol prn occasionally for anxiety only  Most  recent BP reviewed and now stable  Monitor while admitted  Abnormal CT of the abdomen  Noncontrast CT abdomen showed punctate 2 mm calcification in the distal right ureter with no associated upstream hydroureteral nephrosis.   Patient reports no flank pain.  Likely this is an incidental finding and not related to her symptomology.       VTE Pharmacologic Prophylaxis: VTE Score: 2 Low Risk (Score 0-2) - Encourage Ambulation.    Mobility:   Basic Mobility Inpatient Raw Score: 24  JH-HLM Goal: 8: Walk 250 feet or more  JH-HLM Achieved: 8: Walk 250 feet ot more  JH-HLM Goal achieved. Continue to encourage appropriate mobility.    Patient Centered Rounds: I performed bedside rounds with nursing staff today.   Discussions with Specialists or Other Care Team Provider: GI    Education and Discussions with Family / Patient: Patient declined call to .     Current Length of Stay: 2 day(s)  Current Patient Status: Inpatient   Certification Statement: The patient will continue to require additional inpatient hospital stay due to acute on chronica abdominal pain requiring further workup and Gi management  Discharge Plan: Anticipate discharge in 24-48 hrs to home.    Code Status: Level 1 - Full Code    Subjective   Patient stated she was feeling better this morning until they performed the ultrasound. Pt stating now she is feeling this pain and pressure like she did on admission. Pt stating she was nauseous yesterday afternoon but is feeling okay now. Patient also stating when she eats regular food that the symptom start back up about a hour after. Patient denies any chest pain or shortness of breath.     Objective :  Temp:  [97.3 °F (36.3 °C)-97.9 °F (36.6 °C)] 97.3 °F (36.3 °C)  HR:  [69-76] 69  BP: (117-134)/(76-82) 117/76  Resp:  [18] 18  SpO2:  [97 %-100 %] 100 %  O2 Device: None (Room air)    Body mass index is 24.6 kg/m².     Input and Output Summary (last 24 hours):     Intake/Output Summary (Last 24 hours)  at 7/7/2025 0759  Last data filed at 7/7/2025 0500  Gross per 24 hour   Intake 0 ml   Output 400 ml   Net -400 ml       Physical Exam  Vitals and nursing note reviewed.   Constitutional:       General: She is not in acute distress.     Appearance: Normal appearance.   HENT:      Head: Normocephalic.      Nose: Nose normal.      Mouth/Throat:      Mouth: Mucous membranes are moist.      Pharynx: Oropharynx is clear.     Cardiovascular:      Rate and Rhythm: Normal rate and regular rhythm.      Pulses: Normal pulses.      Heart sounds: No murmur heard.  Pulmonary:      Effort: Pulmonary effort is normal. No respiratory distress.   Abdominal:      General: There is no distension.      Palpations: Abdomen is soft.      Tenderness: There is abdominal tenderness.     Musculoskeletal:         General: Normal range of motion.      Cervical back: Normal range of motion.      Right lower leg: No edema.      Left lower leg: No edema.     Skin:     General: Skin is warm and dry.      Capillary Refill: Capillary refill takes less than 2 seconds.     Neurological:      Mental Status: She is alert and oriented to person, place, and time. Mental status is at baseline.      Motor: No weakness.     Psychiatric:         Mood and Affect: Mood is anxious.         Speech: Speech normal.         Behavior: Behavior is cooperative.           Lines/Drains:              Lab Results: I have reviewed the following results:   Results from last 7 days   Lab Units 07/07/25  0317 07/06/25  0541 07/05/25  0704   WBC Thousand/uL 4.28* 4.18* 4.97   HEMOGLOBIN g/dL 12.9 13.7 15.6*   HEMATOCRIT % 39.0 40.8 46.2*   PLATELETS Thousands/uL 163 177 214   SEGS PCT %  --   --  66   LYMPHO PCT %  --  27 29   MONO PCT %  --  8 5   EOS PCT %  --  0 0     Results from last 7 days   Lab Units 07/07/25  0318   SODIUM mmol/L 138   POTASSIUM mmol/L 3.9   CHLORIDE mmol/L 106   CO2 mmol/L 28   BUN mg/dL 9   CREATININE mg/dL 0.74   ANION GAP mmol/L 4   CALCIUM mg/dL 8.4    ALBUMIN g/dL 3.7   TOTAL BILIRUBIN mg/dL 0.48   ALK PHOS U/L 45   ALT U/L 22   AST U/L 25   GLUCOSE RANDOM mg/dL 96                       Recent Cultures (last 7 days):         Imaging Results Review: No pertinent imaging studies reviewed.  Other Study Results Review: No additional pertinent studies reviewed.    Last 24 Hours Medication List:     Current Facility-Administered Medications:     dicyclomine (BENTYL) capsule 10 mg, BID PRN    famotidine (PEPCID) tablet 20 mg, Daily    levothyroxine tablet 75 mcg, Daily    loratadine (CLARITIN) tablet 10 mg, Daily    LORazepam (ATIVAN) tablet 0.5 mg, Q8H PRN    multi-electrolyte (Plasmalyte-A/Isolyte-S PH 7.4/Normosol-R) IV solution, Continuous, Last Rate: 75 mL/hr (07/07/25 0611)    neomycin (MYCIFRADIN) tablet 500 mg, BID    ondansetron (ZOFRAN) injection 4 mg, Q4H PRN    oxyCODONE (ROXICODONE) split tablet 2.5 mg, Q6H PRN    pantoprazole (PROTONIX) injection 40 mg, Q24H MARII    rifaximin (XIFAXAN) tablet 550 mg, Q8H MARII    saccharomyces boulardii (FLORASTOR) capsule 250 mg, BID    venlafaxine (EFFEXOR-XR) 24 hr capsule 37.5 mg, Daily With Breakfast    zolpidem (AMBIEN) tablet 10 mg, HS PRN    Administrative Statements   Today, Patient Was Seen By: KHANH Veras      **Please Note: This note may have been constructed using a voice recognition system.**

## 2025-07-08 ENCOUNTER — APPOINTMENT (INPATIENT)
Dept: GASTROENTEROLOGY | Facility: HOSPITAL | Age: 60
DRG: 392 | End: 2025-07-08
Attending: NURSE PRACTITIONER
Payer: COMMERCIAL

## 2025-07-08 ENCOUNTER — ANESTHESIA EVENT (INPATIENT)
Dept: GASTROENTEROLOGY | Facility: HOSPITAL | Age: 60
DRG: 392 | End: 2025-07-08
Payer: COMMERCIAL

## 2025-07-08 ENCOUNTER — ANESTHESIA (INPATIENT)
Dept: GASTROENTEROLOGY | Facility: HOSPITAL | Age: 60
DRG: 392 | End: 2025-07-08
Payer: COMMERCIAL

## 2025-07-08 VITALS
WEIGHT: 121.8 LBS | OXYGEN SATURATION: 99 % | HEART RATE: 65 BPM | BODY MASS INDEX: 24.56 KG/M2 | DIASTOLIC BLOOD PRESSURE: 71 MMHG | TEMPERATURE: 97.6 F | HEIGHT: 59 IN | SYSTOLIC BLOOD PRESSURE: 138 MMHG | RESPIRATION RATE: 16 BRPM

## 2025-07-08 LAB
ANION GAP SERPL CALCULATED.3IONS-SCNC: 4 MMOL/L (ref 4–13)
BUN SERPL-MCNC: 5 MG/DL (ref 5–25)
CALCIUM SERPL-MCNC: 8.5 MG/DL (ref 8.4–10.2)
CHLORIDE SERPL-SCNC: 107 MMOL/L (ref 96–108)
CO2 SERPL-SCNC: 29 MMOL/L (ref 21–32)
CREAT SERPL-MCNC: 0.76 MG/DL (ref 0.6–1.3)
ERYTHROCYTE [DISTWIDTH] IN BLOOD BY AUTOMATED COUNT: 12.4 % (ref 11.6–15.1)
GFR SERPL CREATININE-BSD FRML MDRD: 85 ML/MIN/1.73SQ M
GLUCOSE SERPL-MCNC: 93 MG/DL (ref 65–140)
HCT VFR BLD AUTO: 38.9 % (ref 34.8–46.1)
HGB BLD-MCNC: 13 G/DL (ref 11.5–15.4)
MAGNESIUM SERPL-MCNC: 2.3 MG/DL (ref 1.9–2.7)
MCH RBC QN AUTO: 31.3 PG (ref 26.8–34.3)
MCHC RBC AUTO-ENTMCNC: 33.4 G/DL (ref 31.4–37.4)
MCV RBC AUTO: 94 FL (ref 82–98)
PLATELET # BLD AUTO: 142 THOUSANDS/UL (ref 149–390)
PMV BLD AUTO: 10.5 FL (ref 8.9–12.7)
POTASSIUM SERPL-SCNC: 4.3 MMOL/L (ref 3.5–5.3)
RBC # BLD AUTO: 4.16 MILLION/UL (ref 3.81–5.12)
SODIUM SERPL-SCNC: 140 MMOL/L (ref 135–147)
WBC # BLD AUTO: 3.55 THOUSAND/UL (ref 4.31–10.16)

## 2025-07-08 PROCEDURE — 80048 BASIC METABOLIC PNL TOTAL CA: CPT

## 2025-07-08 PROCEDURE — 88305 TISSUE EXAM BY PATHOLOGIST: CPT | Performed by: PATHOLOGY

## 2025-07-08 PROCEDURE — 85027 COMPLETE CBC AUTOMATED: CPT

## 2025-07-08 PROCEDURE — 44361 SMALL BOWEL ENDOSCOPY/BIOPSY: CPT | Performed by: INTERNAL MEDICINE

## 2025-07-08 PROCEDURE — 0DB68ZX EXCISION OF STOMACH, VIA NATURAL OR ARTIFICIAL OPENING ENDOSCOPIC, DIAGNOSTIC: ICD-10-PCS | Performed by: INTERNAL MEDICINE

## 2025-07-08 PROCEDURE — 83735 ASSAY OF MAGNESIUM: CPT

## 2025-07-08 PROCEDURE — 99239 HOSP IP/OBS DSCHRG MGMT >30: CPT

## 2025-07-08 PROCEDURE — 0DB98ZX EXCISION OF DUODENUM, VIA NATURAL OR ARTIFICIAL OPENING ENDOSCOPIC, DIAGNOSTIC: ICD-10-PCS | Performed by: INTERNAL MEDICINE

## 2025-07-08 RX ORDER — FENTANYL CITRATE/PF 50 MCG/ML
25 SYRINGE (ML) INJECTION
Refills: 0 | Status: CANCELLED | OUTPATIENT
Start: 2025-07-08

## 2025-07-08 RX ORDER — ALBUTEROL SULFATE 0.83 MG/ML
2.5 SOLUTION RESPIRATORY (INHALATION) ONCE AS NEEDED
Status: CANCELLED | OUTPATIENT
Start: 2025-07-08

## 2025-07-08 RX ORDER — PROPOFOL 10 MG/ML
INJECTION, EMULSION INTRAVENOUS AS NEEDED
Status: DISCONTINUED | OUTPATIENT
Start: 2025-07-08 | End: 2025-07-08

## 2025-07-08 RX ORDER — SODIUM CHLORIDE, SODIUM LACTATE, POTASSIUM CHLORIDE, CALCIUM CHLORIDE 600; 310; 30; 20 MG/100ML; MG/100ML; MG/100ML; MG/100ML
125 INJECTION, SOLUTION INTRAVENOUS CONTINUOUS
Status: DISCONTINUED | OUTPATIENT
Start: 2025-07-08 | End: 2025-07-08

## 2025-07-08 RX ORDER — LIDOCAINE HYDROCHLORIDE 20 MG/ML
INJECTION, SOLUTION EPIDURAL; INFILTRATION; INTRACAUDAL; PERINEURAL AS NEEDED
Status: DISCONTINUED | OUTPATIENT
Start: 2025-07-08 | End: 2025-07-08

## 2025-07-08 RX ORDER — LABETALOL HYDROCHLORIDE 5 MG/ML
5 INJECTION, SOLUTION INTRAVENOUS
Status: CANCELLED | OUTPATIENT
Start: 2025-07-08

## 2025-07-08 RX ORDER — ONDANSETRON 2 MG/ML
4 INJECTION INTRAMUSCULAR; INTRAVENOUS ONCE AS NEEDED
Status: CANCELLED | OUTPATIENT
Start: 2025-07-08

## 2025-07-08 RX ORDER — HYDROMORPHONE HCL/PF 1 MG/ML
0.5 SYRINGE (ML) INJECTION
Refills: 0 | Status: CANCELLED | OUTPATIENT
Start: 2025-07-08

## 2025-07-08 RX ADMIN — PROPOFOL 100 MG: 10 INJECTION, EMULSION INTRAVENOUS at 13:47

## 2025-07-08 RX ADMIN — VENLAFAXINE HYDROCHLORIDE 37.5 MG: 37.5 CAPSULE, EXTENDED RELEASE ORAL at 08:41

## 2025-07-08 RX ADMIN — PROPOFOL 50 MG: 10 INJECTION, EMULSION INTRAVENOUS at 13:49

## 2025-07-08 RX ADMIN — PROPOFOL 50 MG: 10 INJECTION, EMULSION INTRAVENOUS at 13:51

## 2025-07-08 RX ADMIN — RIFAXIMIN 550 MG: 550 TABLET ORAL at 14:58

## 2025-07-08 RX ADMIN — LEVOTHYROXINE SODIUM 75 MCG: 0.07 TABLET ORAL at 08:49

## 2025-07-08 RX ADMIN — LORATADINE 10 MG: 10 TABLET ORAL at 08:49

## 2025-07-08 RX ADMIN — PANTOPRAZOLE SODIUM 40 MG: 40 INJECTION, POWDER, LYOPHILIZED, FOR SOLUTION INTRAVENOUS at 08:49

## 2025-07-08 RX ADMIN — LIDOCAINE HYDROCHLORIDE 70 MG: 20 INJECTION, SOLUTION EPIDURAL; INFILTRATION; INTRACAUDAL at 13:47

## 2025-07-08 RX ADMIN — SODIUM CHLORIDE, SODIUM LACTATE, POTASSIUM CHLORIDE, AND CALCIUM CHLORIDE: .6; .31; .03; .02 INJECTION, SOLUTION INTRAVENOUS at 13:26

## 2025-07-08 RX ADMIN — FAMOTIDINE 20 MG: 20 TABLET, FILM COATED ORAL at 08:49

## 2025-07-08 RX ADMIN — PROPOFOL 30 MG: 10 INJECTION, EMULSION INTRAVENOUS at 13:54

## 2025-07-08 NOTE — ASSESSMENT & PLAN NOTE
patient has history of irritable bowel syndrome with constipation. Patient reports she usually takes Colace daily at home and this keeps her symptoms controlled. Reports having diarrhea overnight

## 2025-07-08 NOTE — ANESTHESIA POSTPROCEDURE EVALUATION
Post-Op Assessment Note    CV Status:  Stable  Pain Score: 0    Pain management: adequate       Mental Status:  Arousable   Hydration Status:  Stable   PONV Controlled:  None   Airway Patency:  Patent     Post Op Vitals Reviewed: Yes    No anethesia notable event occurred.    Staff: CRNA           Last Filed PACU Vitals:  Vitals Value Taken Time   Temp 97.2    Pulse 88    /84    Resp 12    SpO2 99

## 2025-07-08 NOTE — ANESTHESIA PREPROCEDURE EVALUATION
Procedure:  EGD WITH PUSH ENTEROSCOPY    Relevant Problems   ENDO   (+) Acquired hypothyroidism      GI/HEPATIC   (+) Dysphagia   (+) Gastroesophageal reflux disease without esophagitis   (+) Pancreatic cyst      NEURO/PSYCH   (+) Anxiety   (+) Generalized anxiety disorder      PULMONARY   (+) AMY (obstructive sleep apnea)   (+) Obstructive sleep apnea syndrome        Physical Exam    Airway     Mallampati score: III  TM Distance: >3 FB  Neck ROM: full      Cardiovascular  Cardiovascular exam normal    Dental   No notable dental hx     Pulmonary  Pulmonary exam normal     Neurological      Other Findings  post-pubertal.      Anesthesia Plan  ASA Score- 2     Anesthesia Type- IV sedation with anesthesia with ASA Monitors.         Additional Monitors:     Airway Plan: natural airway.    Comment: I have seen the patient and reviewed the history.  Patient to receive IV sedation with full ASA monitors.  Risks discussed with the patient, consent signed.  .       Plan Factors-Exercise tolerance (METS): >4 METS.    Chart reviewed.   Existing labs reviewed. Patient summary reviewed.                  Induction- intravenous.    Postoperative Plan- .   Monitoring Plan - Monitoring plan - standard ASA monitoring      Perioperative Resuscitation Plan - Level 1 - Full Code.       Informed Consent- Anesthetic plan and risks discussed with patient.  I personally reviewed this patient with the CRNA. Discussed and agreed on the Anesthesia Plan with the CRNA..

## 2025-07-08 NOTE — PLAN OF CARE
Problem: PAIN - ADULT  Goal: Verbalizes/displays adequate comfort level or baseline comfort level  Description: Interventions:  - Encourage patient to monitor pain and request assistance  - Assess pain using appropriate pain scale  - Administer analgesics as ordered based on type and severity of pain and evaluate response  - Implement non-pharmacological measures as appropriate and evaluate response  - Consider cultural and social influences on pain and pain management  - Notify physician/advanced practitioner if interventions unsuccessful or patient reports new pain  - Educate patient/family on pain management process including their role and importance of  reporting pain   - Provide non-pharmacologic/complimentary pain relief interventions  Outcome: Progressing     Problem: INFECTION - ADULT  Goal: Absence or prevention of progression during hospitalization  Description: INTERVENTIONS:  - Assess and monitor for signs and symptoms of infection  - Monitor lab/diagnostic results  - Monitor all insertion sites, i.e. indwelling lines, tubes, and drains  - Monitor endotracheal if appropriate and nasal secretions for changes in amount and color  - [place] appropriate cooling/warming therapies per order  - Administer medications as ordered  - Instruct and encourage patient and family to use good hand hygiene technique  - Identify and instruct in appropriate isolation precautions for identified infection/condition  Outcome: Progressing  Goal: Absence of fever/infection during neutropenic period  Description: INTERVENTIONS:  - Monitor WBC  - Perform strict hand hygiene  - Limit to healthy visitors only  - No plants, dried, fresh or silk flowers with salazar in patient room  Outcome: Progressing     Problem: SAFETY ADULT  Goal: Patient will remain free of falls  Description: INTERVENTIONS:  - Educate patient/family on patient safety including physical limitations  - Instruct patient to call for assistance with activity   -  Consider consulting OT/PT to assist with strengthening/mobility based on AM PAC & JH-HLM score  - Consult OT/PT to assist with strengthening/mobility   - Keep Call bell within reach  - Keep bed low and locked with side rails adjusted as appropriate  - Keep care items and personal belongings within reach  - Initiate and maintain comfort rounds  - Make Fall Risk Sign visible to staff  - Offer Toileting every  Hours, in advance of need  - Initiate/Maintain alarm  - Obtain necessary fall risk management equipment:   - Apply yellow socks and bracelet for high fall risk patients  - Consider moving patient to room near nurses station  Outcome: Progressing  Goal: Maintain or return to baseline ADL function  Description: INTERVENTIONS:  -  Assess patient's ability to carry out ADLs; assess patient's baseline for ADL function and identify physical deficits which impact ability to perform ADLs (bathing, care of mouth/teeth, toileting, grooming, dressing, etc.)  - Assess/evaluate cause of self-care deficits   - Assess range of motion  - Assess patient's mobility; develop plan if impaired  - Assess patient's need for assistive devices and provide as appropriate  - Encourage maximum independence but intervene and supervise when necessary  - Involve family in performance of ADLs  - Assess for home care needs following discharge   - Consider OT consult to assist with ADL evaluation and planning for discharge  - Provide patient education as appropriate  - Monitor functional capacity and physical performance, use of AM PAC & JH-HLM   - Monitor gait, balance and fatigue with ambulation    Outcome: Progressing  Goal: Maintains/Returns to pre admission functional level  Description: INTERVENTIONS:  - Perform AM-PAC 6 Click Basic Mobility/ Daily Activity assessment daily.  - Set and communicate daily mobility goal to care team and patient/family/caregiver.   - Collaborate with rehabilitation services on mobility goals if consulted  -  Perform Range of Motion  times a day.  - Reposition patient every  hours.  - Dangle patient  times a day  - Stand patient  times a day  - Ambulate patient  times a day  - Out of bed to chair  times a day   - Out of bed for meals times a day  - Out of bed for toileting  - Record patient progress and toleration of activity level   Outcome: Progressing     Problem: DISCHARGE PLANNING  Goal: Discharge to home or other facility with appropriate resources  Description: INTERVENTIONS:  - Identify barriers to discharge w/patient and caregiver  - Arrange for needed discharge resources and transportation as appropriate  - Identify discharge learning needs (meds, wound care, etc.)  - Arrange for interpretive services to assist at discharge as needed  - Refer to Case Management Department for coordinating discharge planning if the patient needs post-hospital services based on physician/advanced practitioner order or complex needs related to functional status, cognitive ability, or social support system  Outcome: Progressing     Problem: Knowledge Deficit  Goal: Patient/family/caregiver demonstrates understanding of disease process, treatment plan, medications, and discharge instructions  Description: Complete learning assessment and assess knowledge base.  Interventions:  - Provide teaching at level of understanding  - Provide teaching via preferred learning methods  Outcome: Progressing     Problem: GASTROINTESTINAL - ADULT  Goal: Minimal or absence of nausea and/or vomiting  Description: INTERVENTIONS:  - Administer IV fluids if ordered to ensure adequate hydration  - Maintain NPO status until nausea and vomiting are resolved  - Nasogastric tube if ordered  - Administer ordered antiemetic medications as needed  - Provide nonpharmacologic comfort measures as appropriate  - Advance diet as tolerated, if ordered  - Consider nutrition services referral to assist patient with adequate nutrition and appropriate food  choices  Outcome: Progressing  Goal: Maintains or returns to baseline bowel function  Description: INTERVENTIONS:  - Assess bowel function  - Encourage oral fluids to ensure adequate hydration  - Administer IV fluids if ordered to ensure adequate hydration  - Administer ordered medications as needed  - Encourage mobilization and activity  - Consider nutritional services referral to assist patient with adequate nutrition and appropriate food choices  Outcome: Progressing  Goal: Maintains adequate nutritional intake  Description: INTERVENTIONS:  - Monitor percentage of each meal consumed  - Identify factors contributing to decreased intake, treat as appropriate  - Assist with meals as needed  - Monitor I&O, weight, and lab values if indicated  - Obtain nutrition services referral as needed  Outcome: Progressing  Goal: Establish and maintain optimal ostomy function  Description: INTERVENTIONS:  - Assess bowel function  - Encourage oral fluids to ensure adequate hydration  - Administer IV fluids if ordered to ensure adequate hydration   - Administer ordered medications as needed  - Encourage mobilization and activity  - Nutrition services referral to assist patient with appropriate food choices  - Assess stoma site  - Consider wound care consult   Outcome: Progressing  Goal: Oral mucous membranes remain intact  Description: INTERVENTIONS  - Assess oral mucosa and hygiene practices  - Implement preventative oral hygiene regimen  - Implement oral medicated treatments as ordered  - Initiate Nutrition services referral as needed  Outcome: Progressing     Problem: Nutrition/Hydration-ADULT  Goal: Nutrient/Hydration intake appropriate for improving, restoring or maintaining nutritional needs  Description: Monitor and assess patient's nutrition/hydration status for malnutrition. Collaborate with interdisciplinary team and initiate plan and interventions as ordered.  Monitor patient's weight and dietary intake as ordered or per  policy. Utilize nutrition screening tool and intervene as necessary. Determine patient's food preferences and provide high-protein, high-caloric foods as appropriate.     INTERVENTIONS:  - Monitor oral intake, urinary output, labs, and treatment plans  - Assess nutrition and hydration status and recommend course of action  - Evaluate amount of meals eaten  - Assist patient with eating if necessary   - Allow adequate time for meals  - Recommend/ encourage appropriate diets, oral nutritional supplements, and vitamin/mineral supplements  - Order, calculate, and assess calorie counts as needed  - Recommend, monitor, and adjust tube feedings and TPN/PPN based on assessed needs  - Assess need for intravenous fluids  - Provide specific nutrition/hydration education as appropriate  - Include patient/family/caregiver in decisions related to nutrition  Outcome: Progressing

## 2025-07-08 NOTE — DISCHARGE SUMMARY
Discharge Summary - Hospitalist   Name: Divina Perales 60 y.o. female I MRN: [id number]  Unit/Bed#: S -01 I Date of Admission: 7/5/2025   Date of Service: 7/8/2025 I Hospital Day: 3     Assessment & Plan  Acute on chronic abdominal pain and bloating  Patient has had longstanding history of abdominal pain mostly in epigastric area , worse after eating, associated with 10 to 12 pound weight loss since the onset.  Worsening pain and nausea last night prompted her ED visit  Extensive GI work up including normal esophageal manometry, EGD in 2024 mild gastric erosion overall unremarkable with normal biopsies.  Colonoscopy in 2024 diverticulosis.  Celiac disease workup negative by serology and biopsy. Recent breast test positive leading to the diagnosis of SIBO, however no symptomatic improvement yet after receiving 9 days of 14 day course treatment  Lipase nl.    Hx of Irritable bowel syndrome with intermittent constipation diarrhea.  She did have increasing stress and anxiety from recent selling her house and moving.  Her weight loss however is concerning not consistent with typical irritable bowel syndrome  Mesenteric duplex: no acute findings   Upper Gi Series:  Unremarkable upper GI. Only slight delayed passage of barium tablet in the esophagus. Although the ligament of Treitz appears intact there is somewhat redundant proximal jejunum present. It is not dilated.  Stool studies (O&P, enteric bacterial and C. Difficile)-> No further episodes of diarrhea since 7/4. Will discontinue stool studies  GI Consulted  Patient with improvement of abdominal pain   EGD completed today: esophagus, stomach, duodenum and jejunum    Discussed with GI- stable for discharge from GI perspective, await pathology reports and follow up with GI upon discharge   Small intestinal bacterial overgrowth (SIBO)  Recently diagnosed after positive breath test.   C/w Neomycin and Rifaximin on Day 11 of 14 day course. Will continue until July 10 to  finish the course   Anxiety  Continue PTA Medications: Xanax as needed at home.   Elevated blood pressure reading  BP on arrival 189/90.  Patient does not have h/o hypertension.  Likely due to anxiety.  She reported having panic attack this morning  Takes propranolol prn occasionally for anxiety only  Most recent BP reviewed and now stable  Monitor while admitted  Abnormal CT of the abdomen  Noncontrast CT abdomen showed punctate 2 mm calcification in the distal right ureter with no associated upstream hydroureteral nephrosis.   Patient reports no flank pain.  Likely this is an incidental finding and not related to her symptomology.     Irritable bowel syndrome with constipation  patient has history of irritable bowel syndrome with constipation. Patient reports she usually takes Colace daily at home and this keeps her symptoms controlled. Reports having diarrhea overnight      Medical Problems       Resolved Problems  Date Reviewed: 6/11/2025   None       Discharging Physician / Practitioner: Klaudia Teixeira PA-C  PCP: Marly Quigley DO  Admission Date:   Admission Orders (From admission, onward)       Ordered        07/05/25 0923  INPATIENT ADMISSION  Once                          Discharge Date: 07/08/25    Next Steps for Physician/AP Assuming Care:  Follow up on pathology reports that were obtained in EGD     Test Results Pending at Discharge (will require follow up):  Pathology     Medication Changes for Discharge & Rationale:   Continue PPI, Pepcid, Bentyl   Continue  Neomycin and Rifaximin on Day 11 of 14 day course   See after visit summary for reconciled discharge medications provided to patient and/or family.     Consultations During Hospital Stay:  GI     Procedures Performed:   EGD     Significant Findings / Test Results:   Non-contrast CT abdomen showed punctate 2mm calcification in the distal right ureter with no associated upstream hydroureteral nephrosis       Incidental Findings:   None   "      Hospital Course:   Divina Perales is a 60 y.o. female patient who originally presented to the hospital on 7/5/2025 due to abdominal pain and bloating. Patient has a past medical history of GERD, mesenteric pannicultitis, colon polyps, and family history of colon cancer, possible irritable bowel syndrome, and anxiety. Patient has had multiple GI visits secondary to ongoing abdominal pain in the epigastric area, worse after eating. She has had extensive work up including esophageal manometry, EGD and colonoscopy. She was recently diagnosed with small bowel bacterial growth after positive carbohydrate breath test and is currently on neomycin and rifaximin that she is taking 9 days of 14 days.  GI was consulted and suspected that symptoms are likely secondary to underlying SIBO vs irritable bowel syndrome. UGI barium study and mesenteric study were obtained during this admission which had no abnormal acute findings. Patient was then scheduled for EGD on 7/8. Discussed with GI no acute findings on EGD- patient is stable for discharge from GI standpoint. Continue SIBO treatment to complete course on 7/10. Will follow up outpatient GI and await pathology reports.         Please see above list of diagnoses and related plan for additional information.     Discharge Day Visit / Exam:   Subjective:  Patient seen and examined at bedside after EGD procedure, explained results. Her abdominal pain has significantly improved since admission. She is agreeable to discharge at this time.     Vitals: Blood Pressure: 138/71 (07/08/25 1516)  Pulse: 65 (07/08/25 1516)  Temperature: 97.6 °F (36.4 °C) (07/08/25 1516)  Temp Source: Temporal (07/08/25 1359)  Respirations: 16 (07/08/25 1516)  Height: 4' 11\" (149.9 cm) (07/05/25 1538)  Weight - Scale: 55.2 kg (121 lb 12.8 oz) (07/05/25 1538)  SpO2: 99 % (07/08/25 1516)  Physical Exam  Vitals reviewed.   Constitutional:       General: She is not in acute distress.     Appearance: She is not " diaphoretic.     Cardiovascular:      Rate and Rhythm: Normal rate and regular rhythm.      Heart sounds: No murmur heard.  Pulmonary:      Breath sounds: Normal breath sounds.   Abdominal:      General: There is no distension.      Palpations: Abdomen is soft.      Tenderness: There is no abdominal tenderness.     Neurological:      Mental Status: She is alert and oriented to person, place, and time.     Psychiatric:         Mood and Affect: Mood is not anxious or depressed.          Discussion with Family: Updated  (friend) at bedside.    Discharge instructions/Information to patient and family:   See after visit summary for information provided to patient and family.      Provisions for Follow-Up Care:  See after visit summary for information related to follow-up care and any pertinent home health orders.      Mobility at time of Discharge:   Basic Mobility Inpatient Raw Score: 24  JH-HLM Goal: 8: Walk 250 feet or more  JH-HLM Achieved: 8: Walk 250 feet ot more  HLM Goal achieved. Continue to encourage appropriate mobility.     Disposition:   Home    Planned Readmission: none     Administrative Statements   Discharge Statement:  I have spent a total time of 45 minutes in caring for this patient on the day of the visit/encounter. .    **Please Note: This note may have been constructed using a voice recognition system**

## 2025-07-08 NOTE — ASSESSMENT & PLAN NOTE
Patient has had longstanding history of abdominal pain mostly in epigastric area , worse after eating, associated with 10 to 12 pound weight loss since the onset.  Worsening pain and nausea last night prompted her ED visit  Extensive GI work up including normal esophageal manometry, EGD in 2024 mild gastric erosion overall unremarkable with normal biopsies.  Colonoscopy in 2024 diverticulosis.  Celiac disease workup negative by serology and biopsy. Recent breast test positive leading to the diagnosis of SIBO, however no symptomatic improvement yet after receiving 9 days of 14 day course treatment  Lipase nl.    Hx of Irritable bowel syndrome with intermittent constipation diarrhea.  She did have increasing stress and anxiety from recent selling her house and moving.  Her weight loss however is concerning not consistent with typical irritable bowel syndrome  Mesenteric duplex: normal   Upper Gi Series: Unremarkable upper GI.  Stool studies (O&P, enteric bacterial and C. Difficile)-> No further episodes of diarrhea since 7/4. Will discontinue stool studies  Gi Consulted  Continue Supportive care  CLD with IV fluid for dehydration. Continue Bentyl as needed. Continue pepcid for h/o GERD

## 2025-07-08 NOTE — ASSESSMENT & PLAN NOTE
Patient has had longstanding history of abdominal pain mostly in epigastric area , worse after eating, associated with 10 to 12 pound weight loss since the onset.  Worsening pain and nausea last night prompted her ED visit  Extensive GI work up including normal esophageal manometry, EGD in 2024 mild gastric erosion overall unremarkable with normal biopsies.  Colonoscopy in 2024 diverticulosis.  Celiac disease workup negative by serology and biopsy. Recent breast test positive leading to the diagnosis of SIBO, however no symptomatic improvement yet after receiving 9 days of 14 day course treatment  Lipase nl.    Hx of Irritable bowel syndrome with intermittent constipation diarrhea.  She did have increasing stress and anxiety from recent selling her house and moving.  Her weight loss however is concerning not consistent with typical irritable bowel syndrome  Mesenteric duplex: no acute findings   Upper Gi Series:  Unremarkable upper GI. Only slight delayed passage of barium tablet in the esophagus. Although the ligament of Treitz appears intact there is somewhat redundant proximal jejunum present. It is not dilated.  Stool studies (O&P, enteric bacterial and C. Difficile)-> No further episodes of diarrhea since 7/4. Will discontinue stool studies  GI Consulted  Patient with improvement of abdominal pain   EGD completed today: esophagus, stomach, duodenum and jejunum    Discussed with GI- stable for discharge from GI perspective, await pathology reports and follow up with GI upon discharge

## 2025-07-09 ENCOUNTER — ANTICOAG VISIT (OUTPATIENT)
Dept: FAMILY MEDICINE CLINIC | Facility: CLINIC | Age: 60
End: 2025-07-09

## 2025-07-09 ENCOUNTER — TRANSITIONAL CARE MANAGEMENT (OUTPATIENT)
Dept: FAMILY MEDICINE CLINIC | Facility: CLINIC | Age: 60
End: 2025-07-09

## 2025-07-09 NOTE — UTILIZATION REVIEW
NOTIFICATION OF ADMISSION DISCHARGE   This is a Notification of Discharge from [facility]. Please be advised that this patient has been discharge from our facility. Below you will find the admission and discharge date and time including the patient’s disposition.   UTILIZATION REVIEW CONTACT:  Utilization Review Assistants  Network Utilization Review Department  Phone: [phone] x carefully listen to the prompts. All voicemails are confidential.  Email: [email or web address]     ADMISSION INFORMATION  PRESENTATION DATE: 7/5/2025  6:28 AM  OBERVATION ADMISSION DATE: N/A  INPATIENT ADMISSION DATE: 7/5/25  9:23 AM   DISCHARGE DATE: 7/8/2025  7:30 PM   DISPOSITION:Home/Self Care    Network Utilization Review Department  ATTENTION: Please call with any questions or concerns to [phone] and carefully listen to the prompts so that you are directed to the right person. All voicemails are confidential.   For Discharge needs, contact Care Management DC Support Team at [phone] opt. 2  Send all requests for admission clinical reviews, approved or denied determinations and any other requests to dedicated fax number below belonging to the campus where the patient is receiving treatment. List of dedicated fax numbers for the Facilities:  FACILITY NAME UR FAX NUMBER   ADMISSION DENIALS (Administrative/Medical Necessity) [phone]   DISCHARGE SUPPORT TEAM ([facility]) [phone]   PARENT CHILD HEALTH (Maternity/NICU/Pediatrics) [phone]   [facility] [phone]   [facility] [phone]   [facility] [phone]   [facility] [phone]   [facility] [phone]   [facility] [phone]   [facility] [phone]   [facility] [phone]   [facility]  [facility] [phone]   [facility] [phone]   [facility] [phone]   [facility] [phone]

## 2025-07-09 NOTE — ANESTHESIA POSTPROCEDURE EVALUATION
Post-Op Assessment Note    CV Status:  Stable    Pain management: adequate       Mental Status:  Alert and awake   Hydration Status:  Euvolemic   PONV Controlled:  Controlled   Airway Patency:  Patent     Post Op Vitals Reviewed: Yes    No anethesia notable event occurred.    Staff: Anesthesiologist           Last Filed PACU Vitals:  Vitals Value Taken Time   Temp 97 °F (36.1 °C) 07/08/25 14:14   Pulse 75 07/08/25 14:14   /70 07/08/25 14:14   Resp 16 07/08/25 14:14   SpO2 99 % 07/08/25 14:09       Modified Samir:     Vitals Value Taken Time   Activity 2 07/08/25 14:15   Respiration 2 07/08/25 14:15   Circulation 2 07/08/25 14:15   Consciousness 2 07/08/25 14:15   Oxygen Saturation 2 07/08/25 14:15     Modified Samir Score: 10

## 2025-07-14 ENCOUNTER — APPOINTMENT (OUTPATIENT)
Dept: LAB | Facility: CLINIC | Age: 60
End: 2025-07-14

## 2025-07-14 DIAGNOSIS — R19.7 ACUTE DIARRHEA: Primary | ICD-10-CM

## 2025-07-15 ENCOUNTER — APPOINTMENT (OUTPATIENT)
Dept: LAB | Facility: CLINIC | Age: 60
End: 2025-07-15
Payer: COMMERCIAL

## 2025-07-15 DIAGNOSIS — R19.7 ACUTE DIARRHEA: ICD-10-CM

## 2025-07-15 PROCEDURE — 87209 SMEAR COMPLEX STAIN: CPT

## 2025-07-15 PROCEDURE — 87177 OVA AND PARASITES SMEARS: CPT

## 2025-07-15 PROCEDURE — 87505 NFCT AGENT DETECTION GI: CPT

## 2025-07-16 LAB
C COLI+JEJUNI TUF STL QL NAA+PROBE: NEGATIVE
C DIFF TOX GENS STL QL NAA+PROBE: NEGATIVE
EC STX1+STX2 GENES STL QL NAA+PROBE: NEGATIVE
SALMONELLA SP SPAO STL QL NAA+PROBE: NEGATIVE
SHIGELLA SP+EIEC IPAH STL QL NAA+PROBE: NEGATIVE

## 2025-07-17 ENCOUNTER — OFFICE VISIT (OUTPATIENT)
Dept: GASTROENTEROLOGY | Facility: CLINIC | Age: 60
End: 2025-07-17
Payer: COMMERCIAL

## 2025-07-17 VITALS
DIASTOLIC BLOOD PRESSURE: 64 MMHG | BODY MASS INDEX: 24.96 KG/M2 | OXYGEN SATURATION: 99 % | SYSTOLIC BLOOD PRESSURE: 122 MMHG | RESPIRATION RATE: 18 BRPM | WEIGHT: 123.8 LBS | HEART RATE: 56 BPM | HEIGHT: 59 IN | TEMPERATURE: 98.3 F

## 2025-07-17 DIAGNOSIS — R10.13 DYSPEPSIA: ICD-10-CM

## 2025-07-17 DIAGNOSIS — K86.2 PANCREATIC CYST: ICD-10-CM

## 2025-07-17 DIAGNOSIS — R13.10 PILL DYSPHAGIA: ICD-10-CM

## 2025-07-17 DIAGNOSIS — R10.10 UPPER ABDOMINAL PAIN: ICD-10-CM

## 2025-07-17 DIAGNOSIS — K21.9 GASTROESOPHAGEAL REFLUX DISEASE WITHOUT ESOPHAGITIS: ICD-10-CM

## 2025-07-17 DIAGNOSIS — R14.0 BLOATING: ICD-10-CM

## 2025-07-17 DIAGNOSIS — K58.2 IRRITABLE BOWEL SYNDROME WITH BOTH CONSTIPATION AND DIARRHEA: Primary | ICD-10-CM

## 2025-07-17 PROCEDURE — 99214 OFFICE O/P EST MOD 30 MIN: CPT | Performed by: STUDENT IN AN ORGANIZED HEALTH CARE EDUCATION/TRAINING PROGRAM

## 2025-07-17 RX ORDER — PANTOPRAZOLE SODIUM 40 MG/1
40 TABLET, DELAYED RELEASE ORAL DAILY
Qty: 90 TABLET | Refills: 3 | Status: SHIPPED | OUTPATIENT
Start: 2025-07-17

## 2025-07-17 NOTE — ASSESSMENT & PLAN NOTE
Has had extensive workup for acute on chronic upper abdominal pain.  2024 EGD with gastritis/erosion.  Has been taking famotidine 40 mg BID due to concerns about PPI.  Imaging has demonstrated mild redundancy to proximal jejunum however push enteroscopy normal 2025 and this is likely anatomic variant.  SIBO positive 6/2025 and treated with xifaxan/neomycin with unclear response.  Currently reports symptoms much improved with PPI.  Suspect symptoms are combination of gastritis and functional dyspepsia.  Discussed risk/benefit of PPI and strongly recommended ongoing PPI usage given the severity of her symptoms.  Orders:    pantoprazole (PROTONIX) 40 mg tablet; Take 1 tablet (40 mg total) by mouth daily  Ok to continue digestive enzymes if helpful  Ok to use Pepcid for breakthrough

## 2025-07-17 NOTE — ASSESSMENT & PLAN NOTE
"Discharge Planner Post-Acute Rehab OT:     Discharge Plan: House with SO, follow up OP therapies.     Precautions: Falls    Current Status:  ADLs: SBA transfers/mobility using fww. SBA ADLs.  IADLs: TBA. Pt previously Mod IND simple IADLs, SO was assisting.  Vision/Cognition: Pt reports feeling \"foggy\" but that this is improving. Functionally, no significant barriers to discharge.    Assessment: Initial evaluation completed and POC established. Pt performance with ADLs impacted by deconditioning, weakness, and medical complexity. Pt will benefit from skilled OT intervention to advance to Mod IND/IND ADLs and simple IADLs. ELOS 6/4/21.     Other Barriers to Discharge (DME, Family Training, etc):   Medical clearance  DME/AE: fww, shower chair.     " Has had extensive workup for acute on chronic upper abdominal pain.  2024 EGD with gastritis/erosion.  Has been taking famotidine 40 mg BID due to concerns about PPI.  Imaging has demonstrated mild redundancy to proximal jejunum however push enteroscopy normal 2025 and this is likely anatomic variant.  SIBO positive 6/2025 and treated with xifaxan/neomycin with unclear response.  Currently reports symptoms much improved with PPI.  Suspect symptoms are combination of gastritis and functional dyspepsia.  Discussed risk/benefit of PPI and strongly recommended ongoing PPI usage given the severity of her symptoms.  Orders:    pantoprazole (PROTONIX) 40 mg tablet; Take 1 tablet (40 mg total) by mouth daily  Ok to continue digestive enzymes if helpful  Ok to use Pepcid for breakthrough

## 2025-07-17 NOTE — PROGRESS NOTES
Name: Divina Perales      : 1965      MRN: [id number]  Encounter Provider: Felicitas Dumont MD  Encounter Date: 2025   Encounter department: [facility] [place]  :  Assessment & Plan  Dyspepsia  Gastroesophageal reflux disease without esophagitis  Upper abdominal pain  Bloating  Has had extensive workup for acute on chronic upper abdominal pain.   EGD with gastritis/erosion.  Has been taking famotidine 40 mg BID due to concerns about PPI.  Imaging has demonstrated mild redundancy to proximal jejunum however push enteroscopy normal  and this is likely anatomic variant.  SIBO positive 2025 and treated with xifaxan/neomycin with unclear response.  Currently reports symptoms much improved with PPI.  Suspect symptoms are combination of gastritis and functional dyspepsia.  Discussed risk/benefit of PPI and strongly recommended ongoing PPI usage given the severity of her symptoms.  Orders:    pantoprazole (PROTONIX) 40 mg tablet; Take 1 tablet (40 mg total) by mouth daily  Ok to continue digestive enzymes if helpful  Ok to use Pepcid for breakthrough    Irritable bowel syndrome with both constipation and diarrhea  Colonoscopy with TI and random colon biopsies unremarkable .  Infectious stool testing negative .     Currently on low FODMAP diet.  Discussed reintroducing foods to indentify triggers  Continue dicyclomine 10 mg QID PRN abdominal pain/diarrhea  Pancreatic cyst  3 mm pancreatic tail cyst 2025. Repeat imaging in 2 years       Pill dysphagia  EGD with esophageal biopsies negative .  Esophageal manometry normal            History of Present Illness   History of Present Illness  The patient is a 60-year-old female who presents to transfer GI care from her previous gastroenterology provider at [facility]. She was last seen on 2025.    She has undergone extensive GI testing. She was diagnosed with SIBO following a positive test result. She  "experienced pressure in her stomach after eating, which was alleviated by antibiotics, Xifaxan and neomycin. She had 4 episodes of diarrhea from 07/01/2025 to 07/04/2025. On 07/05/2025, she was hospitalized due to waves of nausea and epigastric pressure. During her 4-day hospital stay, various tests were conducted, revealing a twisted bowel, which was not deemed concerning. She also reported difficulty swallowing a pill, requiring 4 swallows of water to pass it into the stomach. She was prescribed Protonix during her hospital stay, which seemed to help. However, she discontinued it after 3 days at home and resumed Prilosec, which provided relief.    She has been on a FODMAP diet since 06/21/2025 and has lost 15 pounds since the onset of her symptoms. She has eliminated dairy from her diet and believes it may be a trigger for her symptoms. She is currently taking Florastor for SIBO and occasionally uses Bentyl for IBS symptoms, although it has not been particularly helpful. She has tried Gas-X without success but found some relief with Gaviscon and IBgard.    She has a history of pancreatitis and a cyst, which was not causing her symptoms. She has been diagnosed with osteoporosis and recently had a DEXA scan, which showed worsening of her condition. She underwent a hysterectomy in the past.    PAST SURGICAL HISTORY:  Hysterectomy    SOCIAL HISTORY  Occupation: Works night shifts  Diet: Currently on a FODMAP diet; avoids dairy; consumes yogurt and kefir    Review of Systems A complete review of systems is negative other than that noted above in the HPI.      Current Medications[1]  Objective   /64 (BP Location: Right arm, Patient Position: Sitting, Cuff Size: Standard)   Pulse 56   Temp 98.3 °F (36.8 °C) (Temporal)   Resp 18   Ht 4' 11\" (1.499 m)   Wt 56.2 kg (123 lb 12.8 oz)   SpO2 99%   BMI 25.00 kg/m²     Physical Exam  Vitals and nursing note reviewed.   Constitutional:       General: She is not in " acute distress.     Appearance: She is well-developed.   HENT:      Head: Normocephalic and atraumatic.     Eyes:      Conjunctiva/sclera: Conjunctivae normal.       Cardiovascular:      Rate and Rhythm: Normal rate and regular rhythm.      Heart sounds: No murmur heard.  Pulmonary:      Effort: Pulmonary effort is normal. No respiratory distress.      Breath sounds: Normal breath sounds.   Abdominal:      Palpations: Abdomen is soft.      Tenderness: There is abdominal tenderness in the epigastric area.     Musculoskeletal:         General: No swelling.      Cervical back: Neck supple.     Skin:     General: Skin is warm and dry.      Capillary Refill: Capillary refill takes less than 2 seconds.     Neurological:      Mental Status: She is alert.     Psychiatric:         Mood and Affect: Mood normal.          Results  Labs   - Gastric Biopsies: 07/08/2025, Mild chronic inactive gastritis   - Duodenal Biopsies: 07/08/2025, Normal   - Distal Esophagus Biopsies: 07/08/2025, Mild reactive changes   - Proximal Esophagus Biopsies: 07/08/2025, Mild reactive changes   - TI Biopsy: 07/08/2025, Reactive expanded lymphoid tissue, no ileitis   - Random Colon Biopsies: 07/08/2025, Normal   - C. difficile, Stool Enteric Panel and Ova and Parasites: 07/15/2025, Negative    Imaging   - Push Enteroscopy: 07/08/2025, Normal   - Esophageal Manometry: 08/26/2024, Normal   - 24-hour pH Monitoring: Low acid exposure time   - Colonoscopy: 07/15/2024, Mild diverticula, otherwise normal   - EGD: 07/15/2024, Mild gastritis with erosion in the stomach  Lab Results: I personally reviewed relevant lab results.       Results for orders placed during the hospital encounter of 07/15/24    Colonoscopy    Narrative  Table formatting from the original result was not included.  [facility]  [address] [place] [address]  [phone]      DATE OF SERVICE:  7/15/24    PHYSICIAN(S):  Attending:  Javon  MD Elgin    Fellow:  No Staff Documented      INDICATION:  Family history of colon cancer, Family history of inflammatory bowel disease, History of colon polyps      POST-OP DIAGNOSIS:  See the impression below.    HISTORY:  Prior colonoscopy: 5 years ago.    BOWEL PREPARATION:        PREPROCEDURE:  Informed consent was obtained for the procedure, including sedation. Risks including but not limited to bleeding, infection, perforation, adverse drug reaction and aspiration were explained in detail. Also explained about less than 100% sensitivity with the exam and other alternatives. The patient was placed in the left lateral decubitus position.    Procedure: Colonoscopy    DETAILS OF PROCEDURE:  Patient was taken to the procedure room where a time out was performed to confirm correct patient and correct procedure. The patient underwent monitored anesthesia care, which was administered by an anesthesia professional. The patient's blood pressure, ECG, ETCO2, heart rate, level of consciousness, oxygen and respirations were monitored throughout the procedure. A digital rectal exam was performed. A perianal exam was performed. The scope was introduced through the anus and advanced to the terminal ileum. Retroflexion was performed in the rectum. The quality of bowel preparation was evaluated using the [place] Bowel Preparation Scale with scores of: right colon = 2, transverse colon = 2, left colon = 2. The total BBPS score was 6. Bowel prep was adequate. The patient experienced no blood loss. The procedure was not difficult. The patient tolerated the procedure well. There were no apparent adverse events.      ANESTHESIA INFORMATION:  ASA: II  Anesthesia Type: IV Sedation with Anesthesia    MEDICATIONS:  No administrations occurring from 0850 to 0918 on 07/15/24        FINDINGS:  All observed locations appeared normal. Performed random biopsy using biopsy forceps.  Multiple small pancolonic  diverticula      EVENTS:  Procedure Events  Event Event Time  ENDO CECUM REACHED 7/15/2024  9:07 AM  ENDO SCOPE OUT TIME 7/15/2024  9:18 AM      SPECIMENS:  ID Type Source Tests Collected by Time Destination  1 : Duodenum bx-cold Tissue Duodenum TISSUE EXAM Javon Eisenberg MD 7/15/2024  8:55 AM  2 : Gastric bx-cold Tissue Stomach TISSUE EXAM Javon Eisenberg MD 7/15/2024  8:56 AM  3 : Distal esophagus bx-cold Tissue Esophagus TISSUE EXAM Javon Eisenberg MD 7/15/2024  8:58 AM  4 : Proximal esophagus bx-cold Tissue Esophagus TISSUE EXAM Javon Eisenberg MD 7/15/2024  8:59 AM  5 : Terminal Ileum bx-cold R/O enteritis Tissue Terminal Ileum TISSUE EXAM Javon Eisenberg MD 7/15/2024  9:08 AM  6 : Random colon bx-cold Tissue Colon TISSUE EXAM Javon Eisenberg MD 7/15/2024  9:11 AM      EQUIPMENT:  Colonoscope -CF   ENDOCUFF VISION LRG GREEN ID [id number]        Impression  Normal. Performed random biopsy using biopsy forceps.  Diverticulosis        RECOMMENDATION:  Repeat colonoscopy in 5 years, due: 7/14/2029  Family history of colon cancer  Family history of colon polyps    High fiber diet            Javon Eisenberg MD             [1]   Current Outpatient Medications   Medication Sig Dispense Refill    dicyclomine (BENTYL) 10 mg capsule TAKE 1 CAPSULE (10 MG TOTAL) BY MOUTH TWICE A DAY AS NEEDED FOR ABDOMINAL PAIN 180 capsule 1    docusate sodium (COLACE) 100 mg capsule One capsule by mouth every other (Patient taking differently: 100 mg in the morning One capsule by mouth every other) 60 capsule 1    famotidine (PEPCID) 40 MG tablet Take 1 tablet (40 mg total) by mouth 2 (two) times a day 180 tablet 1    fexofenadine (Allegra Allergy) 180 MG tablet Take 180 mg by mouth in the morning.      LORazepam (ATIVAN) 0.5 mg tablet Take 1 tablet every 8 hours as needed. 90 tablet 0    ondansetron (ZOFRAN) 8 mg tablet Take 1 tablet (8 mg total) by mouth every 8 (eight) hours as needed for nausea or  vomiting 30 tablet 0    pantoprazole (PROTONIX) 40 mg tablet Take 1 tablet (40 mg total) by mouth daily 90 tablet 3    Peppermint Oil (IBgard) 90 MG CPCR Take one capsule by mouth three times daily as needed for IBS symptoms 60 capsule 11    Probiotic Product (PROBIOTIC PO) Take by mouth      propranolol (INDERAL) 40 mg tablet Take 1 tablet (40 mg total) by mouth 2 (two) times a day 60 tablet 1    saccharomyces boulardii (FLORASTOR) 250 mg capsule Take 1 capsule (250 mg total) by mouth 2 (two) times a day 30 capsule 11    Synthroid 75 MCG tablet TAKE 1 TABLET DAILY 90 tablet 1    venlafaxine (EFFEXOR-XR) 37.5 mg 24 hr capsule Take 1 capsule (37.5 mg total) by mouth daily with breakfast 90 capsule 1    zolpidem (AMBIEN) 10 mg tablet Take 10 mg by mouth if needed for sleep       No current facility-administered medications for this visit.

## 2025-07-24 ENCOUNTER — OFFICE VISIT (OUTPATIENT)
Dept: SLEEP CENTER | Facility: CLINIC | Age: 60
End: 2025-07-24
Payer: COMMERCIAL

## 2025-07-24 VITALS
OXYGEN SATURATION: 98 % | HEIGHT: 59 IN | BODY MASS INDEX: 25 KG/M2 | WEIGHT: 124 LBS | DIASTOLIC BLOOD PRESSURE: 68 MMHG | HEART RATE: 77 BPM | SYSTOLIC BLOOD PRESSURE: 111 MMHG

## 2025-07-24 DIAGNOSIS — G89.29 OTHER CHRONIC PAIN: ICD-10-CM

## 2025-07-24 DIAGNOSIS — K21.9 GASTROESOPHAGEAL REFLUX DISEASE WITHOUT ESOPHAGITIS: ICD-10-CM

## 2025-07-24 DIAGNOSIS — G47.26 SHIFT WORK SLEEP DISORDER: ICD-10-CM

## 2025-07-24 DIAGNOSIS — G47.33 OSA (OBSTRUCTIVE SLEEP APNEA): Primary | ICD-10-CM

## 2025-07-24 DIAGNOSIS — F41.1 GENERALIZED ANXIETY DISORDER: ICD-10-CM

## 2025-07-24 DIAGNOSIS — G47.00 INSOMNIA, UNSPECIFIED TYPE: ICD-10-CM

## 2025-07-24 PROCEDURE — 99204 OFFICE O/P NEW MOD 45 MIN: CPT | Performed by: PSYCHIATRY & NEUROLOGY

## 2025-07-24 NOTE — ASSESSMENT & PLAN NOTE
Severe ABHINAV that is worsening her sleep onset insomnia.  She is currently on Cymbalta and Ativan  Has not seen a psychiatrist in a while.  Undergoes regular therapy  Lately has been having racing thoughts worsening the insomnia despite medications  Discussed optimizing management of anxiety by working closely with the PCP and possibly a psychiatrist  Relaxation techniques reviewed
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
## 2025-07-24 NOTE — ASSESSMENT & PLAN NOTE
Shiftwork sleep disorder worsening her sleep onset insomnia.  Discussed considering a consistent daytime shift and keeping a good sleep schedule that is consistent through the week.  At this point she is unable to change her work hours and prefers working at nighttime.  She is working with a therapist who is helping her find a good sleep block to help alleviate anxiety and insomnia  Orders:    Diagnostic Polysomnogram; Future

## 2025-07-24 NOTE — ASSESSMENT & PLAN NOTE
Recent worsening of reflux symptoms needing hospitalization.  Associated unintended weight loss  Symptoms are currently controlled after start of Protonix  Denies waking up with reflux symptoms after starting Protonix.  She uses a wedge under her mattress to help with GERD symptoms
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
